# Patient Record
Sex: FEMALE | Race: WHITE | Employment: UNEMPLOYED | URBAN - METROPOLITAN AREA
[De-identification: names, ages, dates, MRNs, and addresses within clinical notes are randomized per-mention and may not be internally consistent; named-entity substitution may affect disease eponyms.]

---

## 2018-03-13 ENCOUNTER — HOSPITAL ENCOUNTER (EMERGENCY)
Age: 58
Discharge: HOME | End: 2018-03-13
Payer: COMMERCIAL

## 2018-03-13 ENCOUNTER — APPOINTMENT (OUTPATIENT)
Dept: CT IMAGING | Age: 58
End: 2018-03-13
Payer: COMMERCIAL

## 2018-03-13 VITALS
HEART RATE: 60 BPM | TEMPERATURE: 98 F | HEIGHT: 64 IN | SYSTOLIC BLOOD PRESSURE: 126 MMHG | BODY MASS INDEX: 19.97 KG/M2 | DIASTOLIC BLOOD PRESSURE: 79 MMHG | WEIGHT: 117 LBS | OXYGEN SATURATION: 98 % | RESPIRATION RATE: 18 BRPM

## 2018-03-13 DIAGNOSIS — F41.9 ANXIETY DISORDER, UNSPECIFIED TYPE: ICD-10-CM

## 2018-03-13 DIAGNOSIS — R07.9 CHEST PAIN, UNSPECIFIED TYPE: Primary | ICD-10-CM

## 2018-03-13 DIAGNOSIS — R20.2 PARESTHESIA OF ARM: ICD-10-CM

## 2018-03-13 LAB
ALBUMIN SERPL-MCNC: 4.4 G/DL (ref 3.9–4.9)
ALP BLD-CCNC: 83 U/L (ref 40–130)
ALT SERPL-CCNC: 19 U/L (ref 0–33)
ANION GAP SERPL CALCULATED.3IONS-SCNC: 11 MEQ/L (ref 7–13)
APTT: 27.5 SEC (ref 21.6–35.4)
AST SERPL-CCNC: 20 U/L (ref 0–35)
BASOPHILS ABSOLUTE: 0.1 K/UL (ref 0–0.2)
BASOPHILS RELATIVE PERCENT: 0.7 %
BILIRUB SERPL-MCNC: 0.2 MG/DL (ref 0–1.2)
BILIRUBIN URINE: NEGATIVE
BLOOD, URINE: NEGATIVE
BUN BLDV-MCNC: 11 MG/DL (ref 6–20)
CALCIUM SERPL-MCNC: 9.3 MG/DL (ref 8.6–10.2)
CHLORIDE BLD-SCNC: 101 MEQ/L (ref 98–107)
CLARITY: CLEAR
CO2: 23 MEQ/L (ref 22–29)
COLOR: YELLOW
CREAT SERPL-MCNC: 0.68 MG/DL (ref 0.5–0.9)
EKG ATRIAL RATE: 45 BPM
EKG P AXIS: 64 DEGREES
EKG P-R INTERVAL: 164 MS
EKG Q-T INTERVAL: 440 MS
EKG QRS DURATION: 86 MS
EKG QTC CALCULATION (BAZETT): 380 MS
EKG R AXIS: 71 DEGREES
EKG T AXIS: 58 DEGREES
EKG VENTRICULAR RATE: 45 BPM
EOSINOPHILS ABSOLUTE: 0.3 K/UL (ref 0–0.7)
EOSINOPHILS RELATIVE PERCENT: 2.9 %
GFR AFRICAN AMERICAN: >60
GFR NON-AFRICAN AMERICAN: >60
GLOBULIN: 2.1 G/DL (ref 2.3–3.5)
GLUCOSE BLD-MCNC: 104 MG/DL (ref 74–109)
GLUCOSE URINE: NEGATIVE MG/DL
HCT VFR BLD CALC: 44.3 % (ref 37–47)
HEMOGLOBIN: 14.6 G/DL (ref 12–16)
INR BLD: 1
KETONES, URINE: NEGATIVE MG/DL
LEUKOCYTE ESTERASE, URINE: NEGATIVE
LYMPHOCYTES ABSOLUTE: 4.5 K/UL (ref 1–4.8)
LYMPHOCYTES RELATIVE PERCENT: 48.5 %
MCH RBC QN AUTO: 30.7 PG (ref 27–31.3)
MCHC RBC AUTO-ENTMCNC: 32.9 % (ref 33–37)
MCV RBC AUTO: 93.3 FL (ref 82–100)
MONOCYTES ABSOLUTE: 0.6 K/UL (ref 0.2–0.8)
MONOCYTES RELATIVE PERCENT: 6.1 %
NEUTROPHILS ABSOLUTE: 3.9 K/UL (ref 1.4–6.5)
NEUTROPHILS RELATIVE PERCENT: 41.8 %
NITRITE, URINE: NEGATIVE
PDW BLD-RTO: 14.2 % (ref 11.5–14.5)
PH UA: 6.5 (ref 5–9)
PLATELET # BLD: 203 K/UL (ref 130–400)
POTASSIUM SERPL-SCNC: 3.7 MEQ/L (ref 3.5–5.1)
PROTEIN UA: NEGATIVE MG/DL
PROTHROMBIN TIME: 10.9 SEC (ref 8.1–13.7)
RBC # BLD: 4.75 M/UL (ref 4.2–5.4)
SODIUM BLD-SCNC: 135 MEQ/L (ref 132–144)
SPECIFIC GRAVITY UA: 1 (ref 1–1.03)
TOTAL CK: 58 U/L (ref 0–170)
TOTAL PROTEIN: 6.5 G/DL (ref 6.4–8.1)
TROPONIN: <0.01 NG/ML (ref 0–0.01)
URINE REFLEX TO CULTURE: NORMAL
UROBILINOGEN, URINE: 0.2 E.U./DL
WBC # BLD: 9.2 K/UL (ref 4.8–10.8)

## 2018-03-13 PROCEDURE — 36415 COLL VENOUS BLD VENIPUNCTURE: CPT

## 2018-03-13 PROCEDURE — 71275 CT ANGIOGRAPHY CHEST: CPT

## 2018-03-13 PROCEDURE — 85610 PROTHROMBIN TIME: CPT

## 2018-03-13 PROCEDURE — 99285 EMERGENCY DEPT VISIT HI MDM: CPT

## 2018-03-13 PROCEDURE — 81003 URINALYSIS AUTO W/O SCOPE: CPT

## 2018-03-13 PROCEDURE — 93005 ELECTROCARDIOGRAM TRACING: CPT

## 2018-03-13 PROCEDURE — 82550 ASSAY OF CK (CPK): CPT

## 2018-03-13 PROCEDURE — 80053 COMPREHEN METABOLIC PANEL: CPT

## 2018-03-13 PROCEDURE — 84484 ASSAY OF TROPONIN QUANT: CPT

## 2018-03-13 PROCEDURE — 6360000004 HC RX CONTRAST MEDICATION: Performed by: PHYSICIAN ASSISTANT

## 2018-03-13 PROCEDURE — 85730 THROMBOPLASTIN TIME PARTIAL: CPT

## 2018-03-13 PROCEDURE — 85025 COMPLETE CBC W/AUTO DIFF WBC: CPT

## 2018-03-13 RX ORDER — HYDROMORPHONE HYDROCHLORIDE 8 MG/1
8 TABLET ORAL EVERY 6 HOURS PRN
COMMUNITY
End: 2018-09-04 | Stop reason: SDUPTHER

## 2018-03-13 RX ORDER — OXYCODONE HYDROCHLORIDE 60 MG/1
60 TABLET, FILM COATED, EXTENDED RELEASE ORAL 2 TIMES DAILY
COMMUNITY
End: 2018-09-04 | Stop reason: SDUPTHER

## 2018-03-13 RX ORDER — DRONABINOL 10 MG/1
10 CAPSULE ORAL PRN
COMMUNITY
End: 2018-10-15

## 2018-03-13 RX ORDER — CHLORAL HYDRATE 500 MG
1000 CAPSULE ORAL 2 TIMES DAILY
COMMUNITY
End: 2019-07-09

## 2018-03-13 RX ORDER — PRAVASTATIN SODIUM 40 MG
40 TABLET ORAL DAILY
COMMUNITY
End: 2019-08-06 | Stop reason: SDUPTHER

## 2018-03-13 RX ORDER — ZALEPLON 10 MG/1
10 CAPSULE ORAL PRN
COMMUNITY
End: 2018-08-09

## 2018-03-13 RX ORDER — 0.9 % SODIUM CHLORIDE 0.9 %
1000 INTRAVENOUS SOLUTION INTRAVENOUS ONCE
Status: DISCONTINUED | OUTPATIENT
Start: 2018-03-13 | End: 2018-03-13 | Stop reason: HOSPADM

## 2018-03-13 RX ORDER — TOPIRAMATE 100 MG/1
100 TABLET, FILM COATED ORAL 2 TIMES DAILY
COMMUNITY
End: 2018-08-29 | Stop reason: ALTCHOICE

## 2018-03-13 RX ORDER — ESCITALOPRAM OXALATE 20 MG/1
20 TABLET ORAL DAILY
COMMUNITY
End: 2019-08-06

## 2018-03-13 RX ADMIN — IOPAMIDOL 100 ML: 755 INJECTION, SOLUTION INTRAVENOUS at 18:33

## 2018-03-13 ASSESSMENT — ENCOUNTER SYMPTOMS
COUGH: 0
SHORTNESS OF BREATH: 1
TROUBLE SWALLOWING: 0
VOMITING: 0
EYE PAIN: 0
COLOR CHANGE: 0
ABDOMINAL PAIN: 0
CHEST TIGHTNESS: 1
APNEA: 0
NAUSEA: 0
ALLERGIC/IMMUNOLOGIC NEGATIVE: 1

## 2018-03-13 ASSESSMENT — PAIN DESCRIPTION - ORIENTATION: ORIENTATION: RIGHT

## 2018-03-13 ASSESSMENT — HEART SCORE: ECG: 0

## 2018-03-13 ASSESSMENT — PAIN DESCRIPTION - DESCRIPTORS: DESCRIPTORS: HEAVINESS

## 2018-03-13 ASSESSMENT — PAIN SCALES - GENERAL: PAINLEVEL_OUTOF10: 6

## 2018-03-13 ASSESSMENT — PAIN DESCRIPTION - LOCATION: LOCATION: ARM;CHEST;NECK

## 2018-03-13 ASSESSMENT — PAIN DESCRIPTION - PAIN TYPE: TYPE: ACUTE PAIN

## 2018-03-13 NOTE — ED PROVIDER NOTES
[address] [facility] ED  eMERGENCY dEPARTMENT eNCOUnter      Pt Name: Micki Roberts  MRN: [id number]  [place] 1960  Date of evaluation: 3/13/2018  Provider: Amber Lauren PA-C    CHIEF COMPLAINT       Chief Complaint   Patient presents with    Shortness of Breath     SOB, left arm numbness and CP x 1 week to 10 days, sent by [facility] nurse, Hx of blood clots with PE x 4         HISTORY OF PRESENT ILLNESS  (Location/Symptom, Timing/Onset, Context/Setting, Quality, Duration, Modifying Factors, Severity.)   Micki Roberts is a 62 y.o. female who presents to the emergency department for shortness of breath. Patient states she has been experiencing SOB for past 10 days with intermittent chest pain and numbness of left arm. SOB worsens with exertion. Chest pain is under left breast region, describes as \"sharp but faint\" and non-radiating\",  Patient also reports left arm numbness that comes and goes, not positional.  Patient has history of PE dx 7/25/17. HPI    Nursing Notes were reviewed and I agree. REVIEW OF SYSTEMS    (2-9 systems for level 4, 10 or more for level 5)     Review of Systems   Constitutional: Positive for chills. Negative for diaphoresis and fever. HENT: Negative for hearing loss and trouble swallowing. Eyes: Negative for pain. Respiratory: Positive for chest tightness and shortness of breath. Negative for apnea and cough. Cardiovascular: Positive for chest pain. Negative for leg swelling. Gastrointestinal: Negative for abdominal pain, nausea and vomiting. Endocrine: Negative. Genitourinary: Negative for difficulty urinating, dysuria and hematuria. Musculoskeletal: Negative for neck pain and neck stiffness. Skin: Negative for color change. Allergic/Immunologic: Negative. Neurological: Positive for headaches. Negative for dizziness and light-headedness. Numbness: left arm\   Hematological: Negative. Psychiatric/Behavioral: Negative.     All other systems reviewed

## 2018-03-14 PROCEDURE — 93010 ELECTROCARDIOGRAM REPORT: CPT | Performed by: INTERNAL MEDICINE

## 2018-08-09 ENCOUNTER — OFFICE VISIT (OUTPATIENT)
Dept: FAMILY MEDICINE CLINIC | Age: 58
End: 2018-08-09
Payer: COMMERCIAL

## 2018-08-09 VITALS
OXYGEN SATURATION: 96 % | HEART RATE: 70 BPM | DIASTOLIC BLOOD PRESSURE: 84 MMHG | WEIGHT: 122.8 LBS | BODY MASS INDEX: 20.96 KG/M2 | HEIGHT: 64 IN | SYSTOLIC BLOOD PRESSURE: 130 MMHG

## 2018-08-09 DIAGNOSIS — G89.4 CHRONIC PAIN DISORDER: Primary | ICD-10-CM

## 2018-08-09 DIAGNOSIS — M54.17 LUMBOSACRAL RADICULOPATHY DUE TO TRAUMA: ICD-10-CM

## 2018-08-09 DIAGNOSIS — F11.29 OPIOID DEPENDENCE WITH OPIOID-INDUCED DISORDER (HCC): ICD-10-CM

## 2018-08-09 DIAGNOSIS — M96.1 POSTLAMINECTOMY SYNDROME, CERVICAL REGION: ICD-10-CM

## 2018-08-09 DIAGNOSIS — G90.519 COMPLEX REGIONAL PAIN SYNDROME TYPE 1 OF UPPER EXTREMITY, UNSPECIFIED LATERALITY: ICD-10-CM

## 2018-08-09 PROBLEM — F33.2 SEVERE EPISODE OF RECURRENT MAJOR DEPRESSIVE DISORDER, WITHOUT PSYCHOTIC FEATURES (HCC): Status: ACTIVE | Noted: 2017-04-17

## 2018-08-09 PROCEDURE — 99202 OFFICE O/P NEW SF 15 MIN: CPT | Performed by: FAMILY MEDICINE

## 2018-08-09 RX ORDER — GABAPENTIN 300 MG/1
300 CAPSULE ORAL 3 TIMES DAILY
Qty: 90 CAPSULE | Refills: 5 | Status: SHIPPED | OUTPATIENT
Start: 2018-08-09 | End: 2018-10-15 | Stop reason: SDUPTHER

## 2018-08-09 ASSESSMENT — PATIENT HEALTH QUESTIONNAIRE - PHQ9
SUM OF ALL RESPONSES TO PHQ9 QUESTIONS 1 & 2: 1
SUM OF ALL RESPONSES TO PHQ QUESTIONS 1-9: 1
1. LITTLE INTEREST OR PLEASURE IN DOING THINGS: 0
2. FEELING DOWN, DEPRESSED OR HOPELESS: 1
SUM OF ALL RESPONSES TO PHQ QUESTIONS 1-9: 1

## 2018-08-09 NOTE — PROGRESS NOTES
Chief Complaint   Patient presents with   [address]     sees painmanagement that is closing at end of month, needs new referral    Other     mamm within year, colonoscopy 2 years ago,        HPI:  Regine Villalobos is a 62 y.o. female     She is on INCREDIBLY high levels of narcotic pain medications    Reportedly Dr. Emmett Hamilton in [place] is to be closing at the end of the month    Reviewed history through care everywhere    She was seeing pain mgmt at [facility] in 2009 and was on norco 2-3/day at that time    Most recent office note from Int Med Dr. Allison Becerra states patient became argumentative when he declined to prescribe benzos to her because of her high dose opiate medication regimen    Multiple surgeries on her neck    She is seeing a grief counselor  She has been diagnosed with PTSD  She states she had a \"breakdown in communication\"    Brings up medical marijuana   Explained [facility] does not permit their doctors to prescribe    Mentions xanax and/or klonopin    She states she had a reaction to either abilify or protriptyline    Was on gabapentin in past    Patient Active Problem List   Diagnosis    Anxiety and depression    Brachial neuritis or radiculitis    Cervical spondylosis with myelopathy    Chronic pain disorder    Lumbago    Lumbosacral radiculopathy due to trauma    Other and unspecified hyperlipidemia    Postlaminectomy syndrome, cervical region    Reflex sympathetic dystrophy of upper extremity    Severe episode of recurrent major depressive disorder, without psychotic features ([facility] [address]    Spinal stenosis, other region       Current Outpatient Prescriptions   Medication Sig Dispense Refill    gabapentin (NEURONTIN) 300 MG capsule Take 1 capsule by mouth 3 times daily for 180 days. . 90 capsule 5    oxyCODONE (OXYCONTIN) 60 MG T12A extended release tablet Take 60 mg by mouth 2 times daily.  HYDROmorphone (DILAUDID) 8 MG tablet Take 8 mg by mouth every 6 hours as needed for Pain.  fentaNYL (ACTIQ) 800 MCG lollipop Place 800 mcg inside cheek 2 times daily.  topiramate (TOPAMAX) 100 MG tablet Take 100 mg by mouth 2 times daily      pravastatin (PRAVACHOL) 40 MG tablet Take 40 mg by mouth daily      escitalopram (LEXAPRO) 20 MG tablet Take 20 mg by mouth daily      Omega-3 Fatty Acids (FISH OIL) 1000 MG CAPS Take 1,000 mg by mouth 2 times daily      Multiple Vitamins-Minerals (MULTIVITAMIN ADULT PO) Take 1 tablet by mouth daily      dronabinol (MARINOL) 10 MG capsule Take 10 mg by mouth as needed. No current facility-administered medications for this visit. Past Medical History:   Diagnosis Date    Anxiety     Depression     Hyperlipidemia     Hypopotassemia     Lumbago     Osteopenia     PTSD (post-traumatic stress disorder)     Pulmonary embolism (HCC)     4 PE in bilat lungs    Spinal stenosis     congenital     Past Surgical History:   Procedure Laterality Date    HYSTERECTOMY      SPINE SURGERY      cervical x 4, fusions and reconstruction    TONSILLECTOMY      age 3     History reviewed. No pertinent family history.   Social History     Social History    Marital status:      Spouse name: N/A    Number of children: N/A    Years of education: N/A     Social History Main Topics    Smoking status: Current Every Day Smoker     Packs/day: 0.50     Years: 40.00     Types: Cigarettes    Smokeless tobacco: Never Used    Alcohol use No    Drug use: No    Sexual activity: Not Asked     Other Topics Concern    None     Social History Narrative    None     Allergies   Allergen Reactions    Latex Anaphylaxis    Ceclor [Cefaclor] Rash    Tetracyclines & Related Rash       Review of Systems:   General ROS: negative for - chills, fatigue, fever, malaise, weight gain or weight loss  Respiratory ROS: no cough, shortness of breath, or wheezing  Cardiovascular ROS: no chest pain or dyspnea on exertion  Gastrointestinal ROS: constipation  Genito-Urinary ROS: no dysuria, trouble voiding  Musculoskeletal ROS: chronic pain      In general patient otherwise reports feeling well. Physical Exam:  /84 (Site: Left Arm)   Pulse 70   Ht 5' 3.5\" (1.613 m)   Wt 122 lb 12.8 oz (55.7 kg)   SpO2 96%   Breastfeeding? No   BMI 21.41 kg/m²     Gen: Well, NAD, Alert, Oriented x 3   HEENT: EOMI, eyes clear, MMM  Skin: without rash or jaundice    Psych:she is generally calm today  No further exam today    Lab Results   Component Value Date    WBC 9.2 03/13/2018    HGB 14.6 03/13/2018    HCT 44.3 03/13/2018     03/13/2018    TRIG 118 12/10/2012    ALT 19 03/13/2018    AST 20 03/13/2018     03/13/2018    K 3.7 03/13/2018     03/13/2018    CREATININE 0.68 03/13/2018    BUN 11 03/13/2018    CO2 23 03/13/2018    TSH 0.542 (L) 12/06/2012    INR 1.0 03/13/2018         A&P   Diagnosis Orders   1. Chronic pain disorder  [facility] - ([facility]) Andreea Park MD    gabapentin (NEURONTIN) 300 MG capsule   2. Postlaminectomy syndrome, cervical region  [facility] - ([facility]) Andreea Park MD    gabapentin (NEURONTIN) 300 MG capsule   3. Lumbosacral radiculopathy due to trauma  [facility] - ([facility]) Andreea Park MD    gabapentin (NEURONTIN) 300 MG capsule   4. Complex regional pain syndrome type 1 of upper extremity, unspecified laterality  [facility] - ([facility]) Andreea Park MD    gabapentin (NEURONTIN) 300 MG capsule   5.  Opioid dependence with opioid-induced disorder (HCC)  gabapentin (NEURONTIN) 300 MG capsule       She is cutting down on her doses of opiates    I explained that I cannot prescribe her Benzos while on this high dose of opiates    Needs to see pain mgmt and be tapered off    She is interested in medical marijuana          Carole Allred MD

## 2018-08-29 ENCOUNTER — OFFICE VISIT (OUTPATIENT)
Dept: FAMILY MEDICINE CLINIC | Age: 58
End: 2018-08-29
Payer: COMMERCIAL

## 2018-08-29 VITALS
TEMPERATURE: 97.4 F | WEIGHT: 127.4 LBS | OXYGEN SATURATION: 96 % | BODY MASS INDEX: 21.75 KG/M2 | SYSTOLIC BLOOD PRESSURE: 118 MMHG | HEIGHT: 64 IN | HEART RATE: 61 BPM | DIASTOLIC BLOOD PRESSURE: 60 MMHG

## 2018-08-29 DIAGNOSIS — H57.12 LEFT EYE PAIN: Primary | ICD-10-CM

## 2018-08-29 DIAGNOSIS — H10.32 ACUTE CONJUNCTIVITIS OF LEFT EYE, UNSPECIFIED ACUTE CONJUNCTIVITIS TYPE: ICD-10-CM

## 2018-08-29 PROCEDURE — 99214 OFFICE O/P EST MOD 30 MIN: CPT | Performed by: NURSE PRACTITIONER

## 2018-08-29 RX ORDER — MOXIFLOXACIN 5 MG/ML
1 SOLUTION/ DROPS OPHTHALMIC 3 TIMES DAILY
Qty: 1 BOTTLE | Refills: 0 | Status: SHIPPED | OUTPATIENT
Start: 2018-08-29 | End: 2018-09-05

## 2018-08-29 ASSESSMENT — ENCOUNTER SYMPTOMS
EYE PAIN: 1
EYE DISCHARGE: 1
VOMITING: 1
EYE ITCHING: 1
EYE REDNESS: 1
PHOTOPHOBIA: 0
FOREIGN BODY SENSATION: 1
NAUSEA: 1
BLURRED VISION: 1
DOUBLE VISION: 0
RESPIRATORY NEGATIVE: 1

## 2018-08-29 NOTE — PROGRESS NOTES
blurred vision, pain, discharge, redness, itching and visual disturbance. Negative for double vision and photophobia. Respiratory: Negative. Cardiovascular: Negative. Gastrointestinal: Positive for nausea and vomiting. Genitourinary: Negative. Musculoskeletal: Negative. Skin: Negative. Neurological: Negative. Psychiatric/Behavioral: Negative. Objective  Vitals:    08/29/18 1154   BP: 118/60   Pulse: 61   Temp: 97.4 °F (36.3 °C)   SpO2: 96%   Weight: 127 lb 6.4 oz (57.8 kg)   Height: 5' 3.5\" (1.613 m)     Physical Exam   Constitutional: She is oriented to person, place, and time. She appears well-developed and well-nourished. HENT:   Head: Normocephalic and atraumatic. Eyes: Pupils are equal, round, and reactive to light. EOM are normal. Left eye exhibits discharge and exudate. Left conjunctiva is injected. Left conjunctiva has no hemorrhage. Neck: Normal range of motion. Neck supple. Cardiovascular: Normal rate. Pulmonary/Chest: Effort normal and breath sounds normal.   Abdominal: Soft. Bowel sounds are normal.   Musculoskeletal: Normal range of motion. Neurological: She is alert and oriented to person, place, and time. Skin: Skin is warm and dry. Psychiatric: She has a normal mood and affect. Her behavior is normal. Judgment normal.       Assessment & Plan     Diagnosis Orders   1. Left eye pain  moxifloxacin (VIGAMOX) 0.5 % ophthalmic solution   2. Acute conjunctivitis of left eye, unspecified acute conjunctivitis type  moxifloxacin (VIGAMOX) 0.5 % ophthalmic solution     Discussed with pt how to determine best path for weaning current pain meds and that is with the advice of pain managment    Discussed possible optho referral if vision worsens or pain worsens.     Side effects, adverse effects of the medication prescribed today, as well as treatment plan/ rationale and result expectations have been discussed with the patient who expresses understanding and desires to proceed. Close follow up to evaluate treatment results and for coordination of care. I have reviewed the patient's medical history in detail and updated the computerized patient record. As always, patient is advised that if symptoms worsen in any way they will proceed to the nearest emergency room. Return if symptoms worsen or fail to improve, for left eye pain .     Mattie Alcocer, APRN - CNP

## 2018-09-04 DIAGNOSIS — M54.5 LOW BACK PAIN, UNSPECIFIED BACK PAIN LATERALITY, UNSPECIFIED CHRONICITY, WITH SCIATICA PRESENCE UNSPECIFIED: ICD-10-CM

## 2018-09-04 DIAGNOSIS — M54.12 BRACHIAL NEURITIS OR RADICULITIS: ICD-10-CM

## 2018-09-04 DIAGNOSIS — M47.12 CERVICAL SPONDYLOSIS WITH MYELOPATHY: ICD-10-CM

## 2018-09-04 DIAGNOSIS — G89.4 CHRONIC PAIN DISORDER: Primary | ICD-10-CM

## 2018-09-04 DIAGNOSIS — M96.1 POSTLAMINECTOMY SYNDROME, CERVICAL REGION: ICD-10-CM

## 2018-09-04 DIAGNOSIS — G90.519 COMPLEX REGIONAL PAIN SYNDROME TYPE 1 OF UPPER EXTREMITY, UNSPECIFIED LATERALITY: ICD-10-CM

## 2018-09-04 RX ORDER — HYDROMORPHONE HYDROCHLORIDE 8 MG/1
8 TABLET ORAL EVERY 6 HOURS PRN
Qty: 40 TABLET | Refills: 0 | Status: SHIPPED | OUTPATIENT
Start: 2018-09-04 | End: 2018-09-14

## 2018-09-04 RX ORDER — OXYCODONE HYDROCHLORIDE 60 MG/1
60 TABLET, FILM COATED, EXTENDED RELEASE ORAL 2 TIMES DAILY
Qty: 20 EACH | Refills: 0 | Status: SHIPPED | OUTPATIENT
Start: 2018-09-04 | End: 2018-09-14

## 2018-09-10 ENCOUNTER — TELEPHONE (OUTPATIENT)
Dept: FAMILY MEDICINE CLINIC | Age: 58
End: 2018-09-10

## 2018-09-10 DIAGNOSIS — F11.20 OPIATE DEPENDENCE, CONTINUOUS (HCC): Primary | ICD-10-CM

## 2018-09-10 RX ORDER — CLONIDINE HYDROCHLORIDE 0.1 MG/1
0.1 TABLET ORAL 3 TIMES DAILY PRN
Qty: 60 TABLET | Refills: 3 | Status: SHIPPED | OUTPATIENT
Start: 2018-09-10 | End: 2019-01-15 | Stop reason: ALTCHOICE

## 2018-09-10 NOTE — TELEPHONE ENCOUNTER
Pt sees new pain mgmt tomorrow    I am not able to prescribe fentanyl lollipops    She has 60mg oxy ER and oral dilaudid still    Was at window angry that we cannot prescribe    Threatened staff that she would marcelo us because she could drop dead from withdrawals. I explained that she would need to be tapered down on her meds and that she would have to discuss this with pain mgmt. Prescribed clonidine for ease of symptoms and she does still have a fair amount of dilaudid and oxycodone    She apologized for being irritable.      Heaven Zhong MD

## 2018-09-10 NOTE — TELEPHONE ENCOUNTER
The patient stopped in at the office to ask about her Fentanyl. For Dr. Gamaliel Freeman to prescribe he would have to go online, sign up then fill out paperwork to go over with pt. Pt states she has been out of medication for 2 days and going through withdraw. She sees her new pain management tomorrow but they already told her that they do not prescribe fentanyl lollipops. Spoke to Dr. Gamaliel Freeman, he did not want to sign up and if pt going through withdraw she could go to the ER. Advised pt who became very upset, didn't know why he could just go online and sign up, states that she could die without this medication. She said she will marcelo Dr. Gamaliel Freeman if anything bad would happen to her and then wanted to speak to him personally. Dr. Gamaliel Freeman brought her back into a room to speak to her.

## 2018-09-28 ENCOUNTER — TELEPHONE (OUTPATIENT)
Dept: FAMILY MEDICINE CLINIC | Age: 58
End: 2018-09-28

## 2018-09-28 DIAGNOSIS — M54.17 LUMBOSACRAL RADICULOPATHY DUE TO TRAUMA: ICD-10-CM

## 2018-09-28 DIAGNOSIS — M54.5 LOW BACK PAIN, UNSPECIFIED BACK PAIN LATERALITY, UNSPECIFIED CHRONICITY, WITH SCIATICA PRESENCE UNSPECIFIED: ICD-10-CM

## 2018-09-28 DIAGNOSIS — M47.12 CERVICAL SPONDYLOSIS WITH MYELOPATHY: Primary | ICD-10-CM

## 2018-09-28 DIAGNOSIS — M96.1 POSTLAMINECTOMY SYNDROME, CERVICAL REGION: ICD-10-CM

## 2018-09-28 DIAGNOSIS — G90.519 COMPLEX REGIONAL PAIN SYNDROME TYPE 1 OF UPPER EXTREMITY, UNSPECIFIED LATERALITY: ICD-10-CM

## 2018-09-28 DIAGNOSIS — G89.4 CHRONIC PAIN DISORDER: ICD-10-CM

## 2018-10-02 ENCOUNTER — OFFICE VISIT (OUTPATIENT)
Dept: FAMILY MEDICINE CLINIC | Age: 58
End: 2018-10-02
Payer: COMMERCIAL

## 2018-10-02 VITALS
HEART RATE: 59 BPM | HEIGHT: 64 IN | BODY MASS INDEX: 21.92 KG/M2 | DIASTOLIC BLOOD PRESSURE: 76 MMHG | WEIGHT: 128.4 LBS | OXYGEN SATURATION: 98 % | SYSTOLIC BLOOD PRESSURE: 124 MMHG

## 2018-10-02 DIAGNOSIS — F11.23 OPIOID DEPENDENCE WITH WITHDRAWAL (HCC): Primary | ICD-10-CM

## 2018-10-02 PROCEDURE — 99212 OFFICE O/P EST SF 10 MIN: CPT | Performed by: FAMILY MEDICINE

## 2018-10-05 ENCOUNTER — TELEPHONE (OUTPATIENT)
Dept: FAMILY MEDICINE CLINIC | Age: 58
End: 2018-10-05

## 2018-10-10 ENCOUNTER — TELEPHONE (OUTPATIENT)
Dept: FAMILY MEDICINE CLINIC | Age: 58
End: 2018-10-10

## 2018-10-10 NOTE — TELEPHONE ENCOUNTER
Pt calling states that she can't get scheduled at [facility] pain management until they get a letter  From Dr Shelton Covert stating that pt went to detox and completed that . Also has to say that   hasn't prescribed any opiates and that he won't going forward. Asking for this to be faxed to [phone].         Pt can be reached at [phone]

## 2018-10-15 ENCOUNTER — OFFICE VISIT (OUTPATIENT)
Dept: FAMILY MEDICINE CLINIC | Age: 58
End: 2018-10-15
Payer: COMMERCIAL

## 2018-10-15 VITALS
DIASTOLIC BLOOD PRESSURE: 84 MMHG | TEMPERATURE: 97.7 F | HEIGHT: 64 IN | BODY MASS INDEX: 22.3 KG/M2 | WEIGHT: 130.6 LBS | HEART RATE: 53 BPM | SYSTOLIC BLOOD PRESSURE: 120 MMHG | OXYGEN SATURATION: 98 %

## 2018-10-15 DIAGNOSIS — R19.7 DIARRHEA, UNSPECIFIED TYPE: ICD-10-CM

## 2018-10-15 DIAGNOSIS — Z11.4 SCREENING FOR HIV (HUMAN IMMUNODEFICIENCY VIRUS): ICD-10-CM

## 2018-10-15 DIAGNOSIS — G89.4 CHRONIC PAIN DISORDER: ICD-10-CM

## 2018-10-15 DIAGNOSIS — Z11.59 ENCOUNTER FOR HEPATITIS C SCREENING TEST FOR LOW RISK PATIENT: ICD-10-CM

## 2018-10-15 DIAGNOSIS — M96.1 POSTLAMINECTOMY SYNDROME, CERVICAL REGION: ICD-10-CM

## 2018-10-15 DIAGNOSIS — M54.17 LUMBOSACRAL RADICULOPATHY DUE TO TRAUMA: ICD-10-CM

## 2018-10-15 DIAGNOSIS — R19.7 DIARRHEA, UNSPECIFIED TYPE: Primary | ICD-10-CM

## 2018-10-15 DIAGNOSIS — G90.519 COMPLEX REGIONAL PAIN SYNDROME TYPE 1 OF UPPER EXTREMITY, UNSPECIFIED LATERALITY: ICD-10-CM

## 2018-10-15 DIAGNOSIS — R53.83 FATIGUE, UNSPECIFIED TYPE: ICD-10-CM

## 2018-10-15 DIAGNOSIS — F11.29 OPIOID DEPENDENCE WITH OPIOID-INDUCED DISORDER (HCC): ICD-10-CM

## 2018-10-15 LAB
ALBUMIN SERPL-MCNC: 4.6 G/DL (ref 3.9–4.9)
ALP BLD-CCNC: 78 U/L (ref 40–130)
ALT SERPL-CCNC: 14 U/L (ref 0–33)
ANION GAP SERPL CALCULATED.3IONS-SCNC: 16 MEQ/L (ref 7–13)
AST SERPL-CCNC: 15 U/L (ref 0–35)
BILIRUB SERPL-MCNC: 0.7 MG/DL (ref 0–1.2)
BUN BLDV-MCNC: 13 MG/DL (ref 6–20)
CALCIUM SERPL-MCNC: 10 MG/DL (ref 8.6–10.2)
CHLORIDE BLD-SCNC: 102 MEQ/L (ref 98–107)
CO2: 22 MEQ/L (ref 22–29)
CREAT SERPL-MCNC: 0.69 MG/DL (ref 0.5–0.9)
GFR AFRICAN AMERICAN: >60
GFR NON-AFRICAN AMERICAN: >60
GLOBULIN: 2.8 G/DL (ref 2.3–3.5)
GLUCOSE BLD-MCNC: 96 MG/DL (ref 74–109)
HCT VFR BLD CALC: 48.8 % (ref 37–47)
HEMOGLOBIN: 16 G/DL (ref 12–16)
HEPATITIS C ANTIBODY INTERPRETATION: NORMAL
MCH RBC QN AUTO: 30.5 PG (ref 27–31.3)
MCHC RBC AUTO-ENTMCNC: 32.7 % (ref 33–37)
MCV RBC AUTO: 93.1 FL (ref 82–100)
PDW BLD-RTO: 14 % (ref 11.5–14.5)
PLATELET # BLD: 252 K/UL (ref 130–400)
POTASSIUM SERPL-SCNC: 4.6 MEQ/L (ref 3.5–5.1)
RBC # BLD: 5.25 M/UL (ref 4.2–5.4)
SODIUM BLD-SCNC: 140 MEQ/L (ref 132–144)
TOTAL PROTEIN: 7.4 G/DL (ref 6.4–8.1)
TSH SERPL DL<=0.05 MIU/L-ACNC: 1.47 UIU/ML (ref 0.27–4.2)
WBC # BLD: 12 K/UL (ref 4.8–10.8)

## 2018-10-15 PROCEDURE — 99213 OFFICE O/P EST LOW 20 MIN: CPT | Performed by: FAMILY MEDICINE

## 2018-10-15 RX ORDER — GABAPENTIN 300 MG/1
600 CAPSULE ORAL 3 TIMES DAILY
Qty: 180 CAPSULE | Refills: 5 | Status: SHIPPED | OUTPATIENT
Start: 2018-10-15 | End: 2019-01-15 | Stop reason: ALTCHOICE

## 2018-10-15 RX ORDER — DIPHENOXYLATE HYDROCHLORIDE AND ATROPINE SULFATE 2.5; .025 MG/1; MG/1
1 TABLET ORAL 4 TIMES DAILY PRN
Qty: 40 TABLET | Refills: 0 | Status: SHIPPED | OUTPATIENT
Start: 2018-10-15 | End: 2018-10-25

## 2018-10-15 NOTE — PROGRESS NOTES
Chief Complaint   Patient presents with    Fatigue     no energy, sob, headahce,trouble sleeping, chills, has been out of detox for 9 days, called detox and was told that this isn't normal        HPI:  Justine Mccarthy is a 62 y.o. female     Follow up from detox  Home 9 days  [place] better when in there    Called them and they said this is not normal    She has clonidine/gabapentin    Having ongoing low energy, sleep disturbance, chills  diarrhea    Multiple surgeries on her neck  She has been diagnosed with PTSD            Patient Active Problem List   Diagnosis    Anxiety and depression    Brachial neuritis or radiculitis    Cervical spondylosis with myelopathy    Chronic pain disorder    Lumbago    Lumbosacral radiculopathy due to trauma    Other and unspecified hyperlipidemia    Postlaminectomy syndrome, cervical region    Reflex sympathetic dystrophy of upper extremity    Severe episode of recurrent major depressive disorder, without psychotic features ([facility] [address]    Spinal stenosis, other region       Current Outpatient Prescriptions   Medication Sig Dispense Refill    diphenoxylate-atropine (DIPHENATOL) 2.5-0.025 MG per tablet Take 1 tablet by mouth 4 times daily as needed for Diarrhea for up to 10 days. . 40 tablet 0    gabapentin (NEURONTIN) 300 MG capsule Take 2 capsules by mouth 3 times daily for 180 days. . 180 capsule 5    cloNIDine (CATAPRES) 0.1 MG tablet Take 1 tablet by mouth 3 times daily as needed for Other (withdrawal) 60 tablet 3    pravastatin (PRAVACHOL) 40 MG tablet Take 40 mg by mouth daily      escitalopram (LEXAPRO) 20 MG tablet Take 20 mg by mouth daily      Omega-3 Fatty Acids (FISH OIL) 1000 MG CAPS Take 1,000 mg by mouth 2 times daily      Multiple Vitamins-Minerals (MULTIVITAMIN ADULT PO) Take 1 tablet by mouth daily       No current facility-administered medications for this visit.           Past Medical History:   Diagnosis Date    Anxiety     Depression    

## 2018-10-18 LAB — HIV-1 AND HIV-2 ANTIBODIES: NEGATIVE

## 2018-10-29 ENCOUNTER — TELEPHONE (OUTPATIENT)
Dept: FAMILY MEDICINE CLINIC | Age: 58
End: 2018-10-29

## 2018-11-21 ENCOUNTER — OFFICE VISIT (OUTPATIENT)
Dept: FAMILY MEDICINE CLINIC | Age: 58
End: 2018-11-21
Payer: MEDICARE

## 2018-11-21 VITALS
WEIGHT: 135.2 LBS | TEMPERATURE: 98.8 F | HEIGHT: 63 IN | SYSTOLIC BLOOD PRESSURE: 154 MMHG | DIASTOLIC BLOOD PRESSURE: 80 MMHG | BODY MASS INDEX: 23.96 KG/M2

## 2018-11-21 DIAGNOSIS — J01.90 ACUTE SINUSITIS, RECURRENCE NOT SPECIFIED, UNSPECIFIED LOCATION: Primary | ICD-10-CM

## 2018-11-21 DIAGNOSIS — G89.4 CHRONIC PAIN SYNDROME: ICD-10-CM

## 2018-11-21 DIAGNOSIS — R19.7 DIARRHEA, UNSPECIFIED TYPE: ICD-10-CM

## 2018-11-21 PROCEDURE — 3017F COLORECTAL CA SCREEN DOC REV: CPT | Performed by: NURSE PRACTITIONER

## 2018-11-21 PROCEDURE — 99214 OFFICE O/P EST MOD 30 MIN: CPT | Performed by: NURSE PRACTITIONER

## 2018-11-21 PROCEDURE — G8484 FLU IMMUNIZE NO ADMIN: HCPCS | Performed by: NURSE PRACTITIONER

## 2018-11-21 PROCEDURE — 4004F PT TOBACCO SCREEN RCVD TLK: CPT | Performed by: NURSE PRACTITIONER

## 2018-11-21 PROCEDURE — G8420 CALC BMI NORM PARAMETERS: HCPCS | Performed by: NURSE PRACTITIONER

## 2018-11-21 PROCEDURE — G8427 DOCREV CUR MEDS BY ELIG CLIN: HCPCS | Performed by: NURSE PRACTITIONER

## 2018-11-21 PROCEDURE — 3014F SCREEN MAMMO DOC REV: CPT | Performed by: NURSE PRACTITIONER

## 2018-11-21 RX ORDER — AMITRIPTYLINE HYDROCHLORIDE 25 MG/1
25 TABLET, FILM COATED ORAL NIGHTLY
Qty: 30 TABLET | Refills: 2 | Status: SHIPPED | OUTPATIENT
Start: 2018-11-21 | End: 2019-03-11 | Stop reason: SDUPTHER

## 2018-11-21 RX ORDER — DIPHENOXYLATE HYDROCHLORIDE AND ATROPINE SULFATE 2.5; .025 MG/1; MG/1
1 TABLET ORAL 4 TIMES DAILY PRN
Qty: 40 TABLET | Refills: 0 | Status: SHIPPED | OUTPATIENT
Start: 2018-11-21 | End: 2018-12-01

## 2018-11-21 RX ORDER — AMOXICILLIN AND CLAVULANATE POTASSIUM 875; 125 MG/1; MG/1
1 TABLET, FILM COATED ORAL 2 TIMES DAILY
Qty: 28 TABLET | Refills: 0 | Status: SHIPPED | OUTPATIENT
Start: 2018-11-21 | End: 2018-12-05

## 2018-11-21 RX ORDER — CELECOXIB 200 MG/1
200 CAPSULE ORAL DAILY
Qty: 30 CAPSULE | Refills: 2 | Status: SHIPPED | OUTPATIENT
Start: 2018-11-21 | End: 2019-03-11 | Stop reason: ALTCHOICE

## 2018-11-21 ASSESSMENT — ENCOUNTER SYMPTOMS
VOMITING: 0
WHEEZING: 0
COUGH: 1
SORE THROAT: 1
SINUS PAIN: 1

## 2019-01-15 ENCOUNTER — OFFICE VISIT (OUTPATIENT)
Dept: FAMILY MEDICINE CLINIC | Age: 59
End: 2019-01-15
Payer: MEDICARE

## 2019-01-15 VITALS
DIASTOLIC BLOOD PRESSURE: 84 MMHG | WEIGHT: 135 LBS | SYSTOLIC BLOOD PRESSURE: 138 MMHG | HEIGHT: 63 IN | HEART RATE: 98 BPM | OXYGEN SATURATION: 98 % | TEMPERATURE: 98.2 F | BODY MASS INDEX: 23.92 KG/M2

## 2019-01-15 DIAGNOSIS — R19.7 DIARRHEA, UNSPECIFIED TYPE: Primary | ICD-10-CM

## 2019-01-15 DIAGNOSIS — K52.832 LYMPHOCYTIC COLITIS: ICD-10-CM

## 2019-01-15 PROCEDURE — G8484 FLU IMMUNIZE NO ADMIN: HCPCS | Performed by: NURSE PRACTITIONER

## 2019-01-15 PROCEDURE — 4004F PT TOBACCO SCREEN RCVD TLK: CPT | Performed by: NURSE PRACTITIONER

## 2019-01-15 PROCEDURE — 99214 OFFICE O/P EST MOD 30 MIN: CPT | Performed by: NURSE PRACTITIONER

## 2019-01-15 PROCEDURE — G8427 DOCREV CUR MEDS BY ELIG CLIN: HCPCS | Performed by: NURSE PRACTITIONER

## 2019-01-15 PROCEDURE — G8420 CALC BMI NORM PARAMETERS: HCPCS | Performed by: NURSE PRACTITIONER

## 2019-01-15 PROCEDURE — 3017F COLORECTAL CA SCREEN DOC REV: CPT | Performed by: NURSE PRACTITIONER

## 2019-01-15 RX ORDER — BUDESONIDE 3 MG/1
9 CAPSULE, COATED PELLETS ORAL EVERY MORNING
Qty: 90 CAPSULE | Refills: 0 | Status: SHIPPED | OUTPATIENT
Start: 2019-01-15 | End: 2019-04-09 | Stop reason: SDUPTHER

## 2019-01-15 RX ORDER — TOPIRAMATE 100 MG/1
100 TABLET, FILM COATED ORAL DAILY
COMMUNITY
End: 2020-09-01 | Stop reason: SDUPTHER

## 2019-01-15 ASSESSMENT — ENCOUNTER SYMPTOMS
ABDOMINAL PAIN: 0
SHORTNESS OF BREATH: 0
COUGH: 0
DIARRHEA: 1

## 2019-02-14 ENCOUNTER — HOSPITAL ENCOUNTER (OUTPATIENT)
Dept: PHYSICAL THERAPY | Age: 59
Setting detail: THERAPIES SERIES
Discharge: HOME | End: 2019-02-14
Payer: MEDICARE

## 2019-02-14 PROCEDURE — G8984 CARRY CURRENT STATUS: HCPCS

## 2019-02-14 PROCEDURE — 97163 PT EVAL HIGH COMPLEX 45 MIN: CPT

## 2019-02-14 PROCEDURE — G8985 CARRY GOAL STATUS: HCPCS

## 2019-02-14 PROCEDURE — 97110 THERAPEUTIC EXERCISES: CPT

## 2019-02-14 ASSESSMENT — PAIN DESCRIPTION - INTENSITY: RATING_2: 5

## 2019-02-14 ASSESSMENT — PAIN DESCRIPTION - LOCATION
LOCATION_2: NECK
LOCATION: BACK

## 2019-02-14 ASSESSMENT — PAIN DESCRIPTION - DESCRIPTORS
DESCRIPTORS_2: ACHING
DESCRIPTORS: ACHING

## 2019-02-14 ASSESSMENT — PAIN DESCRIPTION - PAIN TYPE: TYPE: ACUTE PAIN

## 2019-02-14 ASSESSMENT — PAIN SCALES - GENERAL: PAINLEVEL_OUTOF10: 4

## 2019-02-18 ENCOUNTER — HOSPITAL ENCOUNTER (OUTPATIENT)
Dept: PHYSICAL THERAPY | Age: 59
Setting detail: THERAPIES SERIES
Discharge: HOME | End: 2019-02-18
Payer: MEDICARE

## 2019-02-18 PROCEDURE — 97110 THERAPEUTIC EXERCISES: CPT

## 2019-02-18 ASSESSMENT — PAIN DESCRIPTION - DESCRIPTORS: DESCRIPTORS: SORE

## 2019-02-18 ASSESSMENT — PAIN SCALES - GENERAL: PAINLEVEL_OUTOF10: 5

## 2019-02-18 ASSESSMENT — PAIN DESCRIPTION - PAIN TYPE: TYPE: CHRONIC PAIN

## 2019-02-18 ASSESSMENT — PAIN DESCRIPTION - ORIENTATION: ORIENTATION: LEFT;RIGHT

## 2019-02-18 ASSESSMENT — PAIN DESCRIPTION - LOCATION: LOCATION: NECK;ARM

## 2019-02-20 ENCOUNTER — HOSPITAL ENCOUNTER (OUTPATIENT)
Dept: PHYSICAL THERAPY | Age: 59
Setting detail: THERAPIES SERIES
Discharge: HOME | End: 2019-02-20
Payer: MEDICARE

## 2019-02-20 PROCEDURE — 97110 THERAPEUTIC EXERCISES: CPT

## 2019-02-20 PROCEDURE — 97140 MANUAL THERAPY 1/> REGIONS: CPT

## 2019-02-20 ASSESSMENT — PAIN DESCRIPTION - LOCATION: LOCATION: NECK

## 2019-02-20 ASSESSMENT — PAIN SCALES - GENERAL: PAINLEVEL_OUTOF10: 6

## 2019-02-20 ASSESSMENT — PAIN DESCRIPTION - PAIN TYPE: TYPE: CHRONIC PAIN

## 2019-02-20 ASSESSMENT — PAIN DESCRIPTION - DESCRIPTORS: DESCRIPTORS: ACHING;SORE

## 2019-02-20 ASSESSMENT — PAIN DESCRIPTION - ORIENTATION: ORIENTATION: RIGHT;LEFT

## 2019-02-25 ENCOUNTER — HOSPITAL ENCOUNTER (OUTPATIENT)
Dept: PHYSICAL THERAPY | Age: 59
Setting detail: THERAPIES SERIES
Discharge: HOME | End: 2019-02-25
Payer: MEDICARE

## 2019-02-25 PROCEDURE — 97110 THERAPEUTIC EXERCISES: CPT

## 2019-02-25 ASSESSMENT — PAIN DESCRIPTION - DESCRIPTORS: DESCRIPTORS: ACHING;SORE

## 2019-02-25 ASSESSMENT — PAIN DESCRIPTION - PAIN TYPE: TYPE: CHRONIC PAIN

## 2019-02-25 ASSESSMENT — PAIN DESCRIPTION - ORIENTATION: ORIENTATION: RIGHT;LEFT

## 2019-02-25 ASSESSMENT — PAIN SCALES - GENERAL: PAINLEVEL_OUTOF10: 4

## 2019-02-25 ASSESSMENT — PAIN DESCRIPTION - LOCATION: LOCATION: NECK;ARM

## 2019-02-27 ENCOUNTER — HOSPITAL ENCOUNTER (OUTPATIENT)
Dept: PHYSICAL THERAPY | Age: 59
Setting detail: THERAPIES SERIES
Discharge: HOME | End: 2019-02-27
Payer: MEDICARE

## 2019-03-04 ENCOUNTER — HOSPITAL ENCOUNTER (OUTPATIENT)
Dept: PHYSICAL THERAPY | Age: 59
Setting detail: THERAPIES SERIES
Discharge: HOME | End: 2019-03-04
Payer: MEDICARE

## 2019-03-11 ENCOUNTER — OFFICE VISIT (OUTPATIENT)
Dept: FAMILY MEDICINE CLINIC | Age: 59
End: 2019-03-11
Payer: MEDICARE

## 2019-03-11 VITALS
WEIGHT: 142 LBS | HEIGHT: 63 IN | DIASTOLIC BLOOD PRESSURE: 78 MMHG | SYSTOLIC BLOOD PRESSURE: 112 MMHG | TEMPERATURE: 97.7 F | HEART RATE: 90 BPM | BODY MASS INDEX: 25.16 KG/M2

## 2019-03-11 DIAGNOSIS — K21.9 GASTROESOPHAGEAL REFLUX DISEASE WITHOUT ESOPHAGITIS: ICD-10-CM

## 2019-03-11 DIAGNOSIS — R19.7 DIARRHEA, UNSPECIFIED TYPE: Primary | ICD-10-CM

## 2019-03-11 DIAGNOSIS — G89.4 CHRONIC PAIN SYNDROME: ICD-10-CM

## 2019-03-11 DIAGNOSIS — R19.7 DIARRHEA, UNSPECIFIED TYPE: ICD-10-CM

## 2019-03-11 LAB
ALBUMIN SERPL-MCNC: 4.5 G/DL (ref 3.5–4.6)
ALP BLD-CCNC: 91 U/L (ref 40–130)
ALT SERPL-CCNC: 20 U/L (ref 0–33)
ANION GAP SERPL CALCULATED.3IONS-SCNC: 16 MEQ/L (ref 9–15)
AST SERPL-CCNC: 22 U/L (ref 0–35)
BASOPHILS ABSOLUTE: 0.1 K/UL (ref 0–0.2)
BASOPHILS RELATIVE PERCENT: 0.8 %
BILIRUB SERPL-MCNC: 0.3 MG/DL (ref 0.2–0.7)
BUN BLDV-MCNC: 11 MG/DL (ref 6–20)
CALCIUM SERPL-MCNC: 10.2 MG/DL (ref 8.5–9.9)
CHLORIDE BLD-SCNC: 108 MEQ/L (ref 95–107)
CO2: 18 MEQ/L (ref 20–31)
CREAT SERPL-MCNC: 0.65 MG/DL (ref 0.5–0.9)
EOSINOPHILS ABSOLUTE: 0.1 K/UL (ref 0–0.7)
EOSINOPHILS RELATIVE PERCENT: 1.2 %
GFR AFRICAN AMERICAN: >60
GFR NON-AFRICAN AMERICAN: >60
GLOBULIN: 3 G/DL (ref 2.3–3.5)
GLUCOSE BLD-MCNC: 92 MG/DL (ref 70–99)
HCT VFR BLD CALC: 45.5 % (ref 37–47)
HEMOGLOBIN: 15 G/DL (ref 12–16)
LYMPHOCYTES ABSOLUTE: 3 K/UL (ref 1–4.8)
LYMPHOCYTES RELATIVE PERCENT: 28.1 %
MCH RBC QN AUTO: 31.8 PG (ref 27–31.3)
MCHC RBC AUTO-ENTMCNC: 33 % (ref 33–37)
MCV RBC AUTO: 96.5 FL (ref 82–100)
MONOCYTES ABSOLUTE: 0.6 K/UL (ref 0.2–0.8)
MONOCYTES RELATIVE PERCENT: 5.5 %
NEUTROPHILS ABSOLUTE: 6.8 K/UL (ref 1.4–6.5)
NEUTROPHILS RELATIVE PERCENT: 64.4 %
PDW BLD-RTO: 14.2 % (ref 11.5–14.5)
PLATELET # BLD: 263 K/UL (ref 130–400)
POTASSIUM SERPL-SCNC: 4 MEQ/L (ref 3.4–4.9)
RBC # BLD: 4.72 M/UL (ref 4.2–5.4)
SODIUM BLD-SCNC: 142 MEQ/L (ref 135–144)
TOTAL PROTEIN: 7.5 G/DL (ref 6.3–8)
WBC # BLD: 10.6 K/UL (ref 4.8–10.8)

## 2019-03-11 PROCEDURE — 99214 OFFICE O/P EST MOD 30 MIN: CPT | Performed by: NURSE PRACTITIONER

## 2019-03-11 PROCEDURE — G8484 FLU IMMUNIZE NO ADMIN: HCPCS | Performed by: NURSE PRACTITIONER

## 2019-03-11 PROCEDURE — G8419 CALC BMI OUT NRM PARAM NOF/U: HCPCS | Performed by: NURSE PRACTITIONER

## 2019-03-11 PROCEDURE — G8427 DOCREV CUR MEDS BY ELIG CLIN: HCPCS | Performed by: NURSE PRACTITIONER

## 2019-03-11 PROCEDURE — 3017F COLORECTAL CA SCREEN DOC REV: CPT | Performed by: NURSE PRACTITIONER

## 2019-03-11 PROCEDURE — 4004F PT TOBACCO SCREEN RCVD TLK: CPT | Performed by: NURSE PRACTITIONER

## 2019-03-11 RX ORDER — AMITRIPTYLINE HYDROCHLORIDE 25 MG/1
25 TABLET, FILM COATED ORAL NIGHTLY
Qty: 30 TABLET | Refills: 2 | Status: SHIPPED | OUTPATIENT
Start: 2019-03-11 | End: 2019-06-06 | Stop reason: SDUPTHER

## 2019-03-11 RX ORDER — DIPHENOXYLATE HYDROCHLORIDE AND ATROPINE SULFATE 2.5; .025 MG/1; MG/1
1 TABLET ORAL 4 TIMES DAILY PRN
Qty: 40 TABLET | Refills: 0 | Status: SHIPPED | OUTPATIENT
Start: 2019-03-11 | End: 2019-03-21

## 2019-03-11 RX ORDER — OMEPRAZOLE 20 MG/1
20 CAPSULE, DELAYED RELEASE ORAL
Qty: 30 CAPSULE | Refills: 0 | Status: SHIPPED | OUTPATIENT
Start: 2019-03-11 | End: 2019-07-09 | Stop reason: SDUPTHER

## 2019-03-11 ASSESSMENT — ENCOUNTER SYMPTOMS
COUGH: 1
DIARRHEA: 1
ABDOMINAL PAIN: 1
NAUSEA: 1

## 2019-03-14 LAB — ALLERGEN SEE NOTE: NORMAL

## 2019-03-15 LAB
ALLERGEN CODFISH IGE: <0.1 KU/L
ALLERGEN COW MILK IGE: <0.1 KU/L
ALLERGEN EGG WHITE IGE: <0.1 KU/L
ALLERGEN GLUTEN IGE: <0.1 KU/L
ALLERGEN HAZELNUT/FILBERT IGE: <0.1 KU/L
ALLERGEN PEANUT (F13) IGE: <0.1 KU/L
ALLERGEN SCALLOP IGE: <0.1 KU/L
ALLERGEN SHRIMP IGE: <0.1 KU/L
ALLERGEN SOYBEAN IGE: <0.1 KU/L
ALLERGEN WALNUT IGE: <0.1 KU/L
ALLERGEN WHEAT IGE: <0.1 KU/L
CELIAC PANEL: 6 UNITS (ref 0–19)
SESAME SEED IGE: <0.1 KU/L

## 2019-04-09 ENCOUNTER — OFFICE VISIT (OUTPATIENT)
Dept: FAMILY MEDICINE CLINIC | Age: 59
End: 2019-04-09
Payer: MEDICARE

## 2019-04-09 VITALS
BODY MASS INDEX: 24.48 KG/M2 | HEIGHT: 64 IN | TEMPERATURE: 97.6 F | OXYGEN SATURATION: 97 % | DIASTOLIC BLOOD PRESSURE: 70 MMHG | SYSTOLIC BLOOD PRESSURE: 118 MMHG | WEIGHT: 143.4 LBS | HEART RATE: 96 BPM

## 2019-04-09 DIAGNOSIS — R19.7 DIARRHEA, UNSPECIFIED TYPE: ICD-10-CM

## 2019-04-09 DIAGNOSIS — F33.2 SEVERE EPISODE OF RECURRENT MAJOR DEPRESSIVE DISORDER, WITHOUT PSYCHOTIC FEATURES (HCC): ICD-10-CM

## 2019-04-09 DIAGNOSIS — K52.832 LYMPHOCYTIC COLITIS: ICD-10-CM

## 2019-04-09 PROCEDURE — 99213 OFFICE O/P EST LOW 20 MIN: CPT | Performed by: NURSE PRACTITIONER

## 2019-04-09 PROCEDURE — G8427 DOCREV CUR MEDS BY ELIG CLIN: HCPCS | Performed by: NURSE PRACTITIONER

## 2019-04-09 PROCEDURE — G8419 CALC BMI OUT NRM PARAM NOF/U: HCPCS | Performed by: NURSE PRACTITIONER

## 2019-04-09 PROCEDURE — 3017F COLORECTAL CA SCREEN DOC REV: CPT | Performed by: NURSE PRACTITIONER

## 2019-04-09 PROCEDURE — 4004F PT TOBACCO SCREEN RCVD TLK: CPT | Performed by: NURSE PRACTITIONER

## 2019-04-09 RX ORDER — BUDESONIDE 3 MG/1
9 CAPSULE, COATED PELLETS ORAL EVERY MORNING
Qty: 270 CAPSULE | Refills: 0 | Status: SHIPPED | OUTPATIENT
Start: 2019-04-09 | End: 2019-07-08

## 2019-04-09 RX ORDER — BACLOFEN 10 MG/1
TABLET ORAL
Refills: 1 | COMMUNITY
Start: 2019-03-19 | End: 2020-02-03

## 2019-04-09 ASSESSMENT — ENCOUNTER SYMPTOMS
DIARRHEA: 1
ABDOMINAL DISTENTION: 1
BLOOD IN STOOL: 0
ABDOMINAL PAIN: 1

## 2019-04-09 NOTE — PROGRESS NOTES
Subjective  Chief Complaint   Patient presents with    1 Month Follow-Up    Diarrhea     pt states that this has been going on since OCT. pt states that she was given med around [facility] for a month and a friend of sisters who is a DR says 3 mos on med. med was for Colitis     Abdominal Pain     med helped but she is not sure she is on long enough, she understands she does not have medical background but would like to discuss        HPI     Pt is here for a fu of her lymphocytic colitis. Last time I saw her I gave her another script for enterocort. She states that her symptoms improved but then worsened again when getting off of it. She is not willing to see a GI at this time. Pt asking if she can get another round of the prescription for longer. Patient Active Problem List    Diagnosis Date Noted    Severe episode of recurrent major depressive disorder, without psychotic features ([facility] [address] 04/17/2017    Lumbosacral radiculopathy due to trauma 01/06/2016    Anxiety and depression 05/16/2013    Cervical spondylosis with myelopathy 05/16/2013    Chronic pain disorder 05/16/2013    Other and unspecified hyperlipidemia 04/02/2013    Postlaminectomy syndrome, cervical region 03/25/2009    Lumbago 03/11/2009    Reflex sympathetic dystrophy of upper extremity 03/18/2008    Brachial neuritis or radiculitis 01/24/2008    Spinal stenosis, other region 11/04/2003     Past Medical History:   Diagnosis Date    Anxiety     Depression     Hyperlipidemia     Hypopotassemia     Lumbago     Osteopenia     PTSD (post-traumatic stress disorder)     Pulmonary embolism (HCC)     4 PE in bilat lungs    Spinal stenosis     congenital     Past Surgical History:   Procedure Laterality Date    HYSTERECTOMY      SPINE SURGERY      cervical x 4, fusions and reconstruction    TONSILLECTOMY      age 3     No family history on file.   Social History     Socioeconomic History    Marital status:      Spouse name: None    Number of children: None    Years of education: None    Highest education level: None   Occupational History    None   Social Needs    Financial resource strain: None    Food insecurity:     Worry: None     Inability: None    Transportation needs:     Medical: None     Non-medical: None   Tobacco Use    Smoking status: Current Every Day Smoker     Packs/day: 0.50     Years: 40.00     Pack years: 20.00     Types: Cigarettes    Smokeless tobacco: Never Used   Substance and Sexual Activity    Alcohol use: No    Drug use: No    Sexual activity: None   Lifestyle    Physical activity:     Days per week: None     Minutes per session: None    Stress: None   Relationships    Social connections:     Talks on phone: None     Gets together: None     Attends Jewish service: None     Active member of club or organization: None     Attends meetings of clubs or organizations: None     Relationship status: None    Intimate partner violence:     Fear of current or ex partner: None     Emotionally abused: None     Physically abused: None     Forced sexual activity: None   Other Topics Concern    None   Social History Narrative    None     Current Outpatient Medications on File Prior to Visit   Medication Sig Dispense Refill    omeprazole (PRILOSEC) 20 MG delayed release capsule Take 1 capsule by mouth every morning (before breakfast) 30 capsule 0    amitriptyline (ELAVIL) 25 MG tablet Take 1 tablet by mouth nightly 30 tablet 2    topiramate (TOPAMAX) 100 MG tablet Take 100 mg by mouth 2 times daily      pravastatin (PRAVACHOL) 40 MG tablet Take 40 mg by mouth daily      escitalopram (LEXAPRO) 20 MG tablet Take 20 mg by mouth daily      Omega-3 Fatty Acids (FISH OIL) 1000 MG CAPS Take 1,000 mg by mouth 2 times daily      Multiple Vitamins-Minerals (MULTIVITAMIN ADULT PO) Take 1 tablet by mouth daily      baclofen (LIORESAL) 10 MG tablet TAKE ONE TABLET BY MOUTH FOUR TIMES A DAY AS NEEDED  1     No current facility-administered medications on file prior to visit. Allergies   Allergen Reactions    Latex Anaphylaxis     Rash    Fentanyl Rash     At site of application    Ketoprofen      Rash    Morphine Other (See Comments)     GI upset  Other reaction(s): GI Upset  GI upset    Tetracycline      Rash    Cefaclor Rash     Rash    Tetracyclines & Related Rash       Review of Systems   Gastrointestinal: Positive for abdominal distention, abdominal pain and diarrhea. Negative for blood in stool. Objective  Vitals:    04/09/19 1331   BP: 118/70   Pulse: 96   Temp: 97.6 °F (36.4 °C)   SpO2: 97%   Weight: 143 lb 6.4 oz (65 kg)   Height: 5' 3.5\" (1.613 m)     Physical Exam   Constitutional: She is oriented to person, place, and time. She appears well-developed and well-nourished. Cardiovascular: Normal rate, regular rhythm, normal heart sounds and intact distal pulses. Pulmonary/Chest: Effort normal and breath sounds normal.   Abdominal: Soft. She exhibits distension. She exhibits no mass. There is no tenderness. There is no guarding. No hernia. Neurological: She is alert and oriented to person, place, and time. Psychiatric: She has a normal mood and affect. Her behavior is normal. Judgment and thought content normal.   Nursing note and vitals reviewed. Assessment & Plan     Diagnosis Orders   1. Diarrhea, unspecified type  budesonide (ENTOCORT EC) 3 MG extended release capsule   2. Lymphocytic colitis  budesonide (ENTOCORT EC) 3 MG extended release capsule       No orders of the defined types were placed in this encounter.       Orders Placed This Encounter   Medications    budesonide (ENTOCORT EC) 3 MG extended release capsule     Sig: Take 3 capsules by mouth every morning     Dispense:  270 capsule     Refill:  0     Side effects, adverse effects of the medication prescribed today, as well as treatment plan/ rationale and result expectations have been discussed with the patient who expresses understanding and desires to proceed. Close follow up to evaluate treatment results and for coordination of care. I have reviewed the patient's medical history in detail and updated the computerized patient record. As always, patient is advised that if symptoms worsen in any way they will proceed to the nearest emergency room. Fu in 3 mos. Discussed tapering dose down after 6 weeks.     Tasneem Navarro, APRN - CNP

## 2019-06-06 DIAGNOSIS — G89.4 CHRONIC PAIN SYNDROME: ICD-10-CM

## 2019-06-06 RX ORDER — AMITRIPTYLINE HYDROCHLORIDE 25 MG/1
25 TABLET, FILM COATED ORAL NIGHTLY
Qty: 30 TABLET | Refills: 2 | Status: SHIPPED | OUTPATIENT
Start: 2019-06-06 | End: 2019-07-09

## 2019-06-19 ENCOUNTER — HOSPITAL ENCOUNTER (EMERGENCY)
Age: 59
Discharge: HOME | End: 2019-06-19
Attending: EMERGENCY MEDICINE
Payer: MEDICARE

## 2019-06-19 ENCOUNTER — APPOINTMENT (OUTPATIENT)
Dept: CT IMAGING | Age: 59
End: 2019-06-19
Payer: MEDICARE

## 2019-06-19 VITALS
RESPIRATION RATE: 20 BRPM | HEART RATE: 68 BPM | OXYGEN SATURATION: 97 % | TEMPERATURE: 98 F | BODY MASS INDEX: 25.16 KG/M2 | SYSTOLIC BLOOD PRESSURE: 132 MMHG | DIASTOLIC BLOOD PRESSURE: 63 MMHG | HEIGHT: 63 IN | WEIGHT: 142 LBS

## 2019-06-19 DIAGNOSIS — R53.1 GENERAL WEAKNESS: ICD-10-CM

## 2019-06-19 DIAGNOSIS — R19.7 DIARRHEA, UNSPECIFIED TYPE: ICD-10-CM

## 2019-06-19 DIAGNOSIS — R07.89 CHEST WALL PAIN: Primary | ICD-10-CM

## 2019-06-19 LAB
ALBUMIN SERPL-MCNC: 4.4 G/DL (ref 3.5–4.6)
ALP BLD-CCNC: 80 U/L (ref 40–130)
ALT SERPL-CCNC: 19 U/L (ref 0–33)
AMYLASE: 63 U/L (ref 22–93)
ANION GAP SERPL CALCULATED.3IONS-SCNC: 14 MEQ/L (ref 9–15)
AST SERPL-CCNC: 21 U/L (ref 0–35)
BACTERIA: NORMAL /HPF
BASOPHILS ABSOLUTE: 0 K/UL (ref 0–0.2)
BASOPHILS RELATIVE PERCENT: 0.5 %
BILIRUB SERPL-MCNC: 0.3 MG/DL (ref 0.2–0.7)
BILIRUBIN URINE: NEGATIVE
BLOOD, URINE: NORMAL
BUN BLDV-MCNC: 11 MG/DL (ref 6–20)
CALCIUM SERPL-MCNC: 10.3 MG/DL (ref 8.5–9.9)
CHLORIDE BLD-SCNC: 106 MEQ/L (ref 95–107)
CLARITY: CLEAR
CO2: 22 MEQ/L (ref 20–31)
COLOR: YELLOW
CREAT SERPL-MCNC: 0.7 MG/DL (ref 0.5–0.9)
D DIMER: 0.21 MG/L FEU (ref 0–0.5)
EKG ATRIAL RATE: 88 BPM
EKG P AXIS: 73 DEGREES
EKG P-R INTERVAL: 146 MS
EKG Q-T INTERVAL: 364 MS
EKG QRS DURATION: 90 MS
EKG QTC CALCULATION (BAZETT): 440 MS
EKG R AXIS: 88 DEGREES
EKG T AXIS: 71 DEGREES
EKG VENTRICULAR RATE: 88 BPM
EOSINOPHILS ABSOLUTE: 0.1 K/UL (ref 0–0.7)
EOSINOPHILS RELATIVE PERCENT: 1.4 %
EPITHELIAL CELLS, UA: NORMAL /HPF
GFR AFRICAN AMERICAN: >60
GFR NON-AFRICAN AMERICAN: >60
GLOBULIN: 2.8 G/DL (ref 2.3–3.5)
GLUCOSE BLD-MCNC: 144 MG/DL (ref 70–99)
GLUCOSE URINE: NEGATIVE MG/DL
HCT VFR BLD CALC: 46 % (ref 37–47)
HEMOGLOBIN: 15.3 G/DL (ref 12–16)
KETONES, URINE: NEGATIVE MG/DL
LEUKOCYTE ESTERASE, URINE: NEGATIVE
LIPASE: 55 U/L (ref 12–95)
LYMPHOCYTES ABSOLUTE: 3.4 K/UL (ref 1–4.8)
LYMPHOCYTES RELATIVE PERCENT: 36.5 %
MCH RBC QN AUTO: 31.5 PG (ref 27–31.3)
MCHC RBC AUTO-ENTMCNC: 33.3 % (ref 33–37)
MCV RBC AUTO: 94.6 FL (ref 82–100)
MONOCYTES ABSOLUTE: 0.3 K/UL (ref 0.2–0.8)
MONOCYTES RELATIVE PERCENT: 3.5 %
NEUTROPHILS ABSOLUTE: 5.4 K/UL (ref 1.4–6.5)
NEUTROPHILS RELATIVE PERCENT: 58.1 %
NITRITE, URINE: NEGATIVE
PDW BLD-RTO: 13.9 % (ref 11.5–14.5)
PH UA: 7.5 (ref 5–9)
PLATELET # BLD: 249 K/UL (ref 130–400)
POTASSIUM SERPL-SCNC: 3.8 MEQ/L (ref 3.4–4.9)
PROTEIN UA: NEGATIVE MG/DL
RBC # BLD: 4.86 M/UL (ref 4.2–5.4)
RBC UA: NORMAL /HPF (ref 0–2)
SODIUM BLD-SCNC: 142 MEQ/L (ref 135–144)
SPECIFIC GRAVITY UA: 1.01 (ref 1–1.03)
TOTAL PROTEIN: 7.2 G/DL (ref 6.3–8)
TROPONIN: <0.01 NG/ML (ref 0–0.01)
URINE REFLEX TO CULTURE: YES
UROBILINOGEN, URINE: 0.2 E.U./DL
WBC # BLD: 9.4 K/UL (ref 4.8–10.8)
WBC UA: NORMAL /HPF (ref 0–5)

## 2019-06-19 PROCEDURE — 99285 EMERGENCY DEPT VISIT HI MDM: CPT

## 2019-06-19 PROCEDURE — 83690 ASSAY OF LIPASE: CPT

## 2019-06-19 PROCEDURE — 85025 COMPLETE CBC W/AUTO DIFF WBC: CPT

## 2019-06-19 PROCEDURE — 87086 URINE CULTURE/COLONY COUNT: CPT

## 2019-06-19 PROCEDURE — 81001 URINALYSIS AUTO W/SCOPE: CPT

## 2019-06-19 PROCEDURE — 84484 ASSAY OF TROPONIN QUANT: CPT

## 2019-06-19 PROCEDURE — 80053 COMPREHEN METABOLIC PANEL: CPT

## 2019-06-19 PROCEDURE — 36415 COLL VENOUS BLD VENIPUNCTURE: CPT

## 2019-06-19 PROCEDURE — 2580000003 HC RX 258: Performed by: EMERGENCY MEDICINE

## 2019-06-19 PROCEDURE — 82150 ASSAY OF AMYLASE: CPT

## 2019-06-19 PROCEDURE — 74177 CT ABD & PELVIS W/CONTRAST: CPT

## 2019-06-19 PROCEDURE — 93005 ELECTROCARDIOGRAM TRACING: CPT

## 2019-06-19 PROCEDURE — 6360000004 HC RX CONTRAST MEDICATION: Performed by: EMERGENCY MEDICINE

## 2019-06-19 PROCEDURE — 85379 FIBRIN DEGRADATION QUANT: CPT

## 2019-06-19 RX ORDER — TRAMADOL HYDROCHLORIDE 50 MG/1
50 TABLET ORAL EVERY 8 HOURS PRN
Qty: 20 TABLET | Refills: 0 | Status: SHIPPED | OUTPATIENT
Start: 2019-06-19 | End: 2019-06-25

## 2019-06-19 RX ORDER — SODIUM CHLORIDE 0.9 % (FLUSH) 0.9 %
3 SYRINGE (ML) INJECTION EVERY 8 HOURS
Status: DISCONTINUED | OUTPATIENT
Start: 2019-06-19 | End: 2019-06-19 | Stop reason: HOSPADM

## 2019-06-19 RX ADMIN — IOPAMIDOL 100 ML: 755 INJECTION, SOLUTION INTRAVENOUS at 14:42

## 2019-06-19 RX ADMIN — Medication 3 ML: at 14:01

## 2019-06-19 ASSESSMENT — PAIN DESCRIPTION - DESCRIPTORS: DESCRIPTORS: ACHING

## 2019-06-19 ASSESSMENT — ENCOUNTER SYMPTOMS
SORE THROAT: 0
EYE PAIN: 0
SHORTNESS OF BREATH: 0
CHOKING: 0
DIARRHEA: 0
BLOOD IN STOOL: 0
TROUBLE SWALLOWING: 0
EYE REDNESS: 0
VOICE CHANGE: 0
BACK PAIN: 0
COUGH: 0
STRIDOR: 0
VOMITING: 0
CONSTIPATION: 0
CHEST TIGHTNESS: 0
WHEEZING: 0
ABDOMINAL PAIN: 1
SINUS PRESSURE: 0
FACIAL SWELLING: 0
EYE DISCHARGE: 0

## 2019-06-19 ASSESSMENT — PAIN DESCRIPTION - LOCATION: LOCATION: CHEST

## 2019-06-19 ASSESSMENT — PAIN DESCRIPTION - ORIENTATION: ORIENTATION: LEFT

## 2019-06-19 ASSESSMENT — PAIN SCALES - GENERAL: PAINLEVEL_OUTOF10: 4

## 2019-06-19 NOTE — ED PROVIDER NOTES
[facility] ED  eMERGENCY dEPARTMENT eNCOUnter      Pt Name: Shannon Govea  MRN: [id number]  [place] 1960  Date of evaluation: 2019  Provider: Yogi Davis MD    [address] [place] [address]       Chief Complaint   Patient presents with    Chest Pain     Chest pain upeer left chest Pain began yesterday. Patient also feels lightheaded and has a history of blood clots and a spinal injury. HISTORY OF PRESENT ILLNESS   (Location/Symptom, Timing/Onset,Context/Setting, Quality, Duration, Modifying Factors, Severity)  Note limiting factors. Shannon Govea is a 61 y.o. female who presents to the emergency department the patient with history of anxiety depression live with her  remote history of blood clots used to take xeralto l  but not anymore, his stomach issues has a diarrhea 6 times a day has some kind of colitis as per patient Wilfrido. he has 3 times a day this time patient hyperlipidemia smoker chronic pain management. The cervical laminectomy in the past status post hysterectomy  1 para 1 comes with a several days of left-sided chest discomfort labor blood clot no numbness tingling nor relation to the exertion and also upper abdominal discomfort along with the density diarrhea last few days no blood in the stool    HPI    NursingNotes were reviewed. REVIEW OF SYSTEMS    (2-9 systems for level 4, 10 or more for level 5)     Review of Systems   Constitutional: Negative. Negative for activity change and fever. HENT: Negative for congestion, drooling, facial swelling, mouth sores, nosebleeds, sinus pressure, sore throat, trouble swallowing and voice change. Eyes: Negative for pain, discharge, redness and visual disturbance. Respiratory: Negative for cough, choking, chest tightness, shortness of breath, wheezing and stridor. Cardiovascular: Positive for chest pain. Negative for palpitations and leg swelling. Gastrointestinal: Positive for abdominal pain.  Negative for blood in stool, constipation, diarrhea and vomiting. Endocrine: Negative for cold intolerance, polyphagia and polyuria. Genitourinary: Negative for dysuria, flank pain, frequency, genital sores and urgency. Musculoskeletal: Negative for back pain, joint swelling, neck pain and neck stiffness. Skin: Negative for pallor and rash. Neurological: Negative for tremors, seizures, syncope, weakness, numbness and headaches. Hematological: Negative for adenopathy. Does not bruise/bleed easily. Psychiatric/Behavioral: Negative for agitation, behavioral problems, hallucinations and sleep disturbance. The patient is not hyperactive. All other systems reviewed and are negative. Except as noted above the remainder of the review of systems was reviewed and negative.        PAST MEDICAL HISTORY     Past Medical History:   Diagnosis Date    Anxiety     Depression     Hyperlipidemia     Hypopotassemia     Lumbago     Osteopenia     PTSD (post-traumatic stress disorder)     Pulmonary embolism (HCC)     4 PE in bilat lungs    Spinal stenosis     congenital         SURGICALHISTORY       Past Surgical History:   Procedure Laterality Date    HYSTERECTOMY      SPINE SURGERY      cervical x 4, fusions and reconstruction    TONSILLECTOMY      age 3         CURRENT MEDICATIONS       Discharge Medication List as of 6/19/2019  3:25 PM      CONTINUE these medications which have NOT CHANGED    Details   amitriptyline (ELAVIL) 25 MG tablet Take 1 tablet by mouth nightly, Disp-30 tablet, R-2Normal      baclofen (LIORESAL) 10 MG tablet TAKE ONE TABLET BY MOUTH FOUR TIMES A DAY AS NEEDED, R-1Historical Med      budesonide (ENTOCORT EC) 3 MG extended release capsule Take 3 capsules by mouth every morning, Disp-270 capsule, R-0Normal      omeprazole (PRILOSEC) 20 MG delayed release capsule Take 1 capsule by mouth every morning (before breakfast), Disp-30 capsule, R-0Normal      topiramate (TOPAMAX) 100 MG tablet Take 100 or more for level 5)     ED Triage Vitals [06/19/19 1342]   BP Temp Temp Source Pulse Resp SpO2 Height Weight   (!) 145/70 98 °F (36.7 °C) Oral 90 18 98 % 5' 3\" (1.6 m) 142 lb (64.4 kg)   Physical Exam   Constitutional: She is oriented to person, place, and time. She appears well-developed and well-nourished. Active alert cooperative patient in no acute distress breathing normally talks in full sentences   HENT:   Head: Normocephalic. Right Ear: External ear normal.   Left Ear: External ear normal.   Nose: Nose normal.   Mouth/Throat: Oropharynx is clear and moist.   Eyes: Pupils are equal, round, and reactive to light. Conjunctivae are normal.   Neck: Normal range of motion. Neck supple. Cardiovascular: Normal rate, regular rhythm, normal heart sounds and intact distal pulses. Exam reveals no gallop. No murmur heard. Pulmonary/Chest: Breath sounds normal. No respiratory distress. She has no wheezes. Abdominal: Soft. Bowel sounds are normal. She exhibits no mass. There is no tenderness. There is no rebound and no guarding. Musculoskeletal: Normal range of motion. She exhibits no edema or tenderness. Neurological: She is alert and oriented to person, place, and time. No cranial nerve deficit. She exhibits normal muscle tone. Skin: Skin is warm. No rash noted. No erythema. Psychiatric: Her behavior is normal. Thought content normal.   Nursing note and vitals reviewed.     DIAGNOSTIC RESULTS     EKG: All EKG's are interpreted by the Emergency Department Physician who either signs or Co-signsthis chart in the absence of a cardiologist.        RADIOLOGY:   Non-plain filmimages such as CT, Ultrasound and MRI are read by the radiologist. Plain radiographic images are visualized and preliminarily interpreted by the emergency physician with the below findings:        Interpretation per the Radiologist below, if available at the time ofthis note:    CT ABDOMEN PELVIS W IV CONTRAST Additional Contrast? None Final Result   Impression:   1. There is no evidence for obstruction. No appendicitis or diverticulitis seen. 2. No hydronephrosis is seen   3. A cyst is seen in the right ovary. All CT scans at this facility use dose modulation, iterative reconstruction, and/or weight based dosing                                                       ED BEDSIDE ULTRASOUND:   Performed by ED Physician - none    LABS:  Labs Reviewed   COMPREHENSIVE METABOLIC PANEL - Abnormal; Notable for the following components:       Result Value    Glucose 144 (*)     Calcium 10.3 (*)     All other components within normal limits   CBC WITH AUTO DIFFERENTIAL - Abnormal; Notable for the following components:    MCH 31.5 (*)     All other components within normal limits   URINE CULTURE    Narrative:     ORDER#: [id number]                          ORDERED BY: See Rodarte  SOURCE: Urine Clean Catch                  COLLECTED:  06/19/19 14:25  ANTIBIOTICS AT MAXI.:                      RECEIVED :  06/19/19 21:41   URINE RT REFLEX TO CULTURE   LIPASE   AMYLASE   TROPONIN   D-DIMER, QUANTITATIVE   MICROSCOPIC URINALYSIS       All other labs were within normal range or not returned as of this dictation.     EMERGENCY DEPARTMENT COURSE and DIFFERENTIAL DIAGNOSIS/MDM:   Vitals:    Vitals:    06/19/19 1342 06/19/19 1523   BP: (!) 145/70 132/63   Pulse: 90 68   Resp: 18 20   Temp: 98 °F (36.7 °C)    TempSrc: Oral    SpO2: 98% 97%   Weight: 142 lb (64.4 kg)    Height: 5' 3\" (1.6 m)            MDM  Number of Diagnoses or Management Options  Diagnosis management comments: Patient with the numerous complaint for the past several days including chest discomfort thinking about PE (the shoulder pain patient has a history of chronic fibromyalgia chronic pain T fatigue no energy has been complaining of abdominal pain and thinking that she may have colitis used aggressive studies per day but now is getting treated areas per day EKG performed normal sinus rhythm. 8/m no PVCs no ST elevation or ischemia. AR interval of 146 ms the QRS duration of 90 ms. No chest tightness or chest pain on exertion denied drinking alcohol       Amount and/or Complexity of Data Reviewed  Clinical lab tests: ordered and reviewed  Tests in the radiology section of CPT®: ordered and reviewed        CRITICAL CARE TIME   Total Critical Care time was  minutes, excluding separately reportableprocedures. There was a high probability of clinicallysignificant/life threatening deterioration in the patient's condition which required my urgent intervention. CONSULTS:  None    PROCEDURES:  Unless otherwise noted below, none     Procedures    FINAL IMPRESSION      1. Chest wall pain    2. General weakness    3. Diarrhea, unspecified type          DISPOSITION/PLAN   DISPOSITION Decision To Discharge 06/19/2019 03:23:57 PM      PATIENT REFERRED TO:  RINA Crowley - CNP  [address] [address]    In 1 week        DISCHARGE MEDICATIONS:  Discharge Medication List as of 6/19/2019  3:25 PM      START taking these medications    Details   traMADol (ULTRAM) 50 MG tablet Take 1 tablet by mouth every 8 hours as needed for Pain for up to 6 days. , Disp-20 tablet, R-0Print                (Please note that portions of this note were completed with a voice recognition program.  Efforts were made to edit the dictations but occasionally words are mis-transcribed.)    Franki Barthel, MD (electronically signed)  Attending Emergency Physician       Franki Barthel, MD  06/19/19 [address] [facility] Gee Huggins MD  07/15/19 6274

## 2019-06-19 NOTE — ED TRIAGE NOTES
Patient came to ER with complaints of left sided chest pain that radiates around her back and up. She states she has a history of PE and has had very little energy to do anything the past few days.

## 2019-06-21 LAB — URINE CULTURE, ROUTINE: NORMAL

## 2019-07-09 ENCOUNTER — OFFICE VISIT (OUTPATIENT)
Dept: FAMILY MEDICINE CLINIC | Age: 59
End: 2019-07-09
Payer: MEDICARE

## 2019-07-09 VITALS
BODY MASS INDEX: 25.59 KG/M2 | TEMPERATURE: 98.5 F | HEIGHT: 63 IN | HEART RATE: 93 BPM | SYSTOLIC BLOOD PRESSURE: 136 MMHG | DIASTOLIC BLOOD PRESSURE: 88 MMHG | WEIGHT: 144.4 LBS | OXYGEN SATURATION: 98 %

## 2019-07-09 DIAGNOSIS — K21.9 GASTROESOPHAGEAL REFLUX DISEASE WITHOUT ESOPHAGITIS: ICD-10-CM

## 2019-07-09 DIAGNOSIS — R19.7 DIARRHEA, UNSPECIFIED TYPE: Primary | ICD-10-CM

## 2019-07-09 DIAGNOSIS — K52.832 LYMPHOCYTIC COLITIS: ICD-10-CM

## 2019-07-09 DIAGNOSIS — B37.0 THRUSH: ICD-10-CM

## 2019-07-09 DIAGNOSIS — R10.13 EPIGASTRIC PAIN: ICD-10-CM

## 2019-07-09 DIAGNOSIS — R63.5 WEIGHT GAIN: ICD-10-CM

## 2019-07-09 LAB — TSH REFLEX: 1.22 UIU/ML (ref 0.44–3.86)

## 2019-07-09 PROCEDURE — 4004F PT TOBACCO SCREEN RCVD TLK: CPT | Performed by: NURSE PRACTITIONER

## 2019-07-09 PROCEDURE — G8427 DOCREV CUR MEDS BY ELIG CLIN: HCPCS | Performed by: NURSE PRACTITIONER

## 2019-07-09 PROCEDURE — 99214 OFFICE O/P EST MOD 30 MIN: CPT | Performed by: NURSE PRACTITIONER

## 2019-07-09 PROCEDURE — 3017F COLORECTAL CA SCREEN DOC REV: CPT | Performed by: NURSE PRACTITIONER

## 2019-07-09 PROCEDURE — G8419 CALC BMI OUT NRM PARAM NOF/U: HCPCS | Performed by: NURSE PRACTITIONER

## 2019-07-09 RX ORDER — OMEPRAZOLE 20 MG/1
20 CAPSULE, DELAYED RELEASE ORAL
Qty: 30 CAPSULE | Refills: 0 | Status: SHIPPED | OUTPATIENT
Start: 2019-07-09 | End: 2019-08-06 | Stop reason: SDUPTHER

## 2019-07-09 ASSESSMENT — ENCOUNTER SYMPTOMS
NAUSEA: 1
ABDOMINAL PAIN: 1
ABDOMINAL DISTENTION: 1
COUGH: 0
DIARRHEA: 1
SHORTNESS OF BREATH: 0

## 2019-07-12 LAB — THYROID PEROXIDASE (TPO) ABS: <10 IU/ML (ref 0–35)

## 2019-07-24 ENCOUNTER — OFFICE VISIT (OUTPATIENT)
Dept: GASTROENTEROLOGY | Age: 59
End: 2019-07-24
Payer: MEDICARE

## 2019-07-24 VITALS
DIASTOLIC BLOOD PRESSURE: 74 MMHG | HEART RATE: 88 BPM | SYSTOLIC BLOOD PRESSURE: 132 MMHG | OXYGEN SATURATION: 97 % | WEIGHT: 146 LBS | HEIGHT: 63 IN | TEMPERATURE: 97.8 F | BODY MASS INDEX: 25.87 KG/M2

## 2019-07-24 DIAGNOSIS — R19.7 DIARRHEA, UNSPECIFIED TYPE: Primary | ICD-10-CM

## 2019-07-24 PROCEDURE — G8427 DOCREV CUR MEDS BY ELIG CLIN: HCPCS | Performed by: INTERNAL MEDICINE

## 2019-07-24 PROCEDURE — 4004F PT TOBACCO SCREEN RCVD TLK: CPT | Performed by: INTERNAL MEDICINE

## 2019-07-24 PROCEDURE — 99204 OFFICE O/P NEW MOD 45 MIN: CPT | Performed by: INTERNAL MEDICINE

## 2019-07-24 PROCEDURE — G8419 CALC BMI OUT NRM PARAM NOF/U: HCPCS | Performed by: INTERNAL MEDICINE

## 2019-07-24 PROCEDURE — 3017F COLORECTAL CA SCREEN DOC REV: CPT | Performed by: INTERNAL MEDICINE

## 2019-07-24 RX ORDER — DIPHENOXYLATE HYDROCHLORIDE AND ATROPINE SULFATE 2.5; .025 MG/1; MG/1
1 TABLET ORAL 4 TIMES DAILY PRN
Qty: 60 TABLET | Refills: 1 | Status: SHIPPED | OUTPATIENT
Start: 2019-07-24 | End: 2019-08-23

## 2019-07-24 RX ORDER — WHEAT DEXTRIN 3 G/3.8 G
POWDER (GRAM) ORAL
Qty: 1 CAN | Refills: 3 | Status: SHIPPED | OUTPATIENT
Start: 2019-07-24 | End: 2020-02-03

## 2019-07-24 NOTE — PROGRESS NOTES
Gastroenterology Clinic Visit    Wendy Hwang  [id number]  Chief Complaint   Patient presents with    Consultation     HPI: 61 y.o. female presents to the clinic with symptoms of diarrhea since October 2018. Patient prior to October 2018 was on multiple opioid analgesics through Dr. Becka Nation. In October Dr. Venkat Chang clinic was closed and patient stopped taking opiates which resulted in patient having significant diarrhea with bowel movements 7-8 times during the day. Over the last few months her symptoms have improved with now patient having 2-3 loose bowel movements mainly in the a.m. Patient denies any blood in the stool. Patient does report to having bloating and lower abdominal discomfort. Patient status post colonoscopy in 2014 and 2016 with suspected diagnosis of lymphocytic colitis and biopsies in 2014. He was treated with Entocort for a month with some response. Patient more recently treated with Entocort in January and then subsequently in March at which time patient to the Entocort for 3 months. Patient had thrush secondary to the Entocort use and required nystatin swish and swallow. Patient continues to have symptoms of altered taste sensation and numbness in the tongue since the diagnosis of thrush. Biopsies from colonoscopy in 2016 did not confirm the diagnosis of lymphocytic colitis    Review of Systems   All other systems reviewed and are negative.      Past Medical History:   Diagnosis Date    Anxiety     Depression     Hyperlipidemia     Hypopotassemia     Lumbago     Osteopenia     PTSD (post-traumatic stress disorder)     Pulmonary embolism (HCC)     4 PE in bilat lungs    Spinal stenosis     congenital       Past Surgical History:   Procedure Laterality Date    HYSTERECTOMY      SPINE SURGERY      cervical x 4, fusions and reconstruction    TONSILLECTOMY      age 3     Current Outpatient Medications on File Prior to Visit   Medication Sig Dispense Refill    omeprazole

## 2019-08-06 DIAGNOSIS — K21.9 GASTROESOPHAGEAL REFLUX DISEASE WITHOUT ESOPHAGITIS: ICD-10-CM

## 2019-08-06 RX ORDER — PRAVASTATIN SODIUM 40 MG
40 TABLET ORAL DAILY
Qty: 90 TABLET | Refills: 1 | Status: SHIPPED | OUTPATIENT
Start: 2019-08-06 | End: 2020-02-03

## 2019-08-06 RX ORDER — OMEPRAZOLE 20 MG/1
20 CAPSULE, DELAYED RELEASE ORAL
Qty: 90 CAPSULE | Refills: 1 | Status: SHIPPED | OUTPATIENT
Start: 2019-08-06 | End: 2020-01-03

## 2019-08-06 RX ORDER — ESCITALOPRAM OXALATE 10 MG/1
10 TABLET ORAL DAILY
Qty: 90 TABLET | Refills: 1 | Status: SHIPPED | OUTPATIENT
Start: 2019-08-06 | End: 2019-12-11 | Stop reason: CLARIF

## 2019-09-06 DIAGNOSIS — G89.4 CHRONIC PAIN SYNDROME: ICD-10-CM

## 2019-09-08 RX ORDER — AMITRIPTYLINE HYDROCHLORIDE 25 MG/1
25 TABLET, FILM COATED ORAL NIGHTLY
Qty: 30 TABLET | Refills: 2 | Status: SHIPPED | OUTPATIENT
Start: 2019-09-08 | End: 2020-02-03

## 2019-09-25 ENCOUNTER — OFFICE VISIT (OUTPATIENT)
Dept: GASTROENTEROLOGY | Age: 59
End: 2019-09-25
Payer: MEDICARE

## 2019-09-25 VITALS
DIASTOLIC BLOOD PRESSURE: 78 MMHG | BODY MASS INDEX: 25.52 KG/M2 | HEART RATE: 96 BPM | HEIGHT: 63 IN | SYSTOLIC BLOOD PRESSURE: 116 MMHG | OXYGEN SATURATION: 98 % | WEIGHT: 144 LBS

## 2019-09-25 DIAGNOSIS — R19.7 DIARRHEA, UNSPECIFIED TYPE: Primary | ICD-10-CM

## 2019-09-25 PROCEDURE — G8419 CALC BMI OUT NRM PARAM NOF/U: HCPCS | Performed by: NURSE PRACTITIONER

## 2019-09-25 PROCEDURE — 3017F COLORECTAL CA SCREEN DOC REV: CPT | Performed by: NURSE PRACTITIONER

## 2019-09-25 PROCEDURE — G8427 DOCREV CUR MEDS BY ELIG CLIN: HCPCS | Performed by: NURSE PRACTITIONER

## 2019-09-25 PROCEDURE — 99213 OFFICE O/P EST LOW 20 MIN: CPT | Performed by: NURSE PRACTITIONER

## 2019-09-25 PROCEDURE — 4004F PT TOBACCO SCREEN RCVD TLK: CPT | Performed by: NURSE PRACTITIONER

## 2019-09-25 RX ORDER — DIPHENOXYLATE HYDROCHLORIDE AND ATROPINE SULFATE 2.5; .025 MG/1; MG/1
1 TABLET ORAL
COMMUNITY
End: 2019-09-25 | Stop reason: SDUPTHER

## 2019-09-25 RX ORDER — DIPHENOXYLATE HYDROCHLORIDE AND ATROPINE SULFATE 2.5; .025 MG/1; MG/1
1 TABLET ORAL 4 TIMES DAILY PRN
Qty: 120 TABLET | Refills: 3 | Status: SHIPPED | OUTPATIENT
Start: 2019-09-25 | End: 2020-01-23

## 2019-10-01 ENCOUNTER — OFFICE VISIT (OUTPATIENT)
Dept: FAMILY MEDICINE CLINIC | Age: 59
End: 2019-10-01
Payer: MEDICARE

## 2019-10-01 VITALS
BODY MASS INDEX: 24.52 KG/M2 | SYSTOLIC BLOOD PRESSURE: 136 MMHG | OXYGEN SATURATION: 99 % | TEMPERATURE: 98.5 F | HEIGHT: 64 IN | DIASTOLIC BLOOD PRESSURE: 84 MMHG | WEIGHT: 143.6 LBS | HEART RATE: 89 BPM

## 2019-10-01 DIAGNOSIS — R35.0 URINARY FREQUENCY: Primary | ICD-10-CM

## 2019-10-01 DIAGNOSIS — R35.0 URINARY FREQUENCY: ICD-10-CM

## 2019-10-01 DIAGNOSIS — L08.9 INFECTED CYST OF SKIN: ICD-10-CM

## 2019-10-01 DIAGNOSIS — Z12.39 BREAST CANCER SCREENING: ICD-10-CM

## 2019-10-01 DIAGNOSIS — L72.9 INFECTED CYST OF SKIN: ICD-10-CM

## 2019-10-01 DIAGNOSIS — Z12.31 OTHER SCREENING MAMMOGRAM: ICD-10-CM

## 2019-10-01 DIAGNOSIS — R73.9 HYPERGLYCEMIA: ICD-10-CM

## 2019-10-01 DIAGNOSIS — B37.0 THRUSH: ICD-10-CM

## 2019-10-01 LAB
BILIRUBIN, POC: NORMAL
BLOOD URINE, POC: NORMAL
CLARITY, POC: NORMAL
COLOR, POC: YELLOW
GLUCOSE URINE, POC: NORMAL
HBA1C MFR BLD: 5.6 % (ref 4.8–5.9)
KETONES, POC: NORMAL
LEUKOCYTE EST, POC: NORMAL
NITRITE, POC: NORMAL
PH, POC: 7.5
PROTEIN, POC: NORMAL
SPECIFIC GRAVITY, POC: 1.01
UROBILINOGEN, POC: NORMAL

## 2019-10-01 PROCEDURE — G8484 FLU IMMUNIZE NO ADMIN: HCPCS | Performed by: NURSE PRACTITIONER

## 2019-10-01 PROCEDURE — 3017F COLORECTAL CA SCREEN DOC REV: CPT | Performed by: NURSE PRACTITIONER

## 2019-10-01 PROCEDURE — G8419 CALC BMI OUT NRM PARAM NOF/U: HCPCS | Performed by: NURSE PRACTITIONER

## 2019-10-01 PROCEDURE — 4004F PT TOBACCO SCREEN RCVD TLK: CPT | Performed by: NURSE PRACTITIONER

## 2019-10-01 PROCEDURE — G8427 DOCREV CUR MEDS BY ELIG CLIN: HCPCS | Performed by: NURSE PRACTITIONER

## 2019-10-01 PROCEDURE — 99214 OFFICE O/P EST MOD 30 MIN: CPT | Performed by: NURSE PRACTITIONER

## 2019-10-01 PROCEDURE — 81002 URINALYSIS NONAUTO W/O SCOPE: CPT | Performed by: NURSE PRACTITIONER

## 2019-10-01 RX ORDER — SULFAMETHOXAZOLE AND TRIMETHOPRIM 800; 160 MG/1; MG/1
1 TABLET ORAL 2 TIMES DAILY
Qty: 14 TABLET | Refills: 0 | Status: SHIPPED | OUTPATIENT
Start: 2019-10-01 | End: 2019-10-08

## 2019-10-01 ASSESSMENT — ENCOUNTER SYMPTOMS
COLOR CHANGE: 1
SHORTNESS OF BREATH: 0
CHEST TIGHTNESS: 0

## 2019-12-04 ENCOUNTER — TELEPHONE (OUTPATIENT)
Dept: FAMILY MEDICINE CLINIC | Age: 59
End: 2019-12-04

## 2019-12-04 DIAGNOSIS — M47.12 CERVICAL SPONDYLOSIS WITH MYELOPATHY: Primary | ICD-10-CM

## 2019-12-04 DIAGNOSIS — M54.17 LUMBOSACRAL RADICULOPATHY DUE TO TRAUMA: ICD-10-CM

## 2019-12-04 DIAGNOSIS — M96.1 POSTLAMINECTOMY SYNDROME, CERVICAL REGION: ICD-10-CM

## 2019-12-11 ENCOUNTER — OFFICE VISIT (OUTPATIENT)
Dept: FAMILY MEDICINE CLINIC | Age: 59
End: 2019-12-11
Payer: MEDICARE

## 2019-12-11 VITALS
DIASTOLIC BLOOD PRESSURE: 80 MMHG | SYSTOLIC BLOOD PRESSURE: 136 MMHG | HEIGHT: 64 IN | RESPIRATION RATE: 20 BRPM | BODY MASS INDEX: 24.34 KG/M2 | TEMPERATURE: 98.3 F | OXYGEN SATURATION: 97 % | WEIGHT: 142.6 LBS | HEART RATE: 113 BPM

## 2019-12-11 DIAGNOSIS — F41.9 ANXIETY AND DEPRESSION: ICD-10-CM

## 2019-12-11 DIAGNOSIS — B37.0 THRUSH: ICD-10-CM

## 2019-12-11 DIAGNOSIS — M54.12 BRACHIAL NEURITIS OR RADICULITIS: ICD-10-CM

## 2019-12-11 DIAGNOSIS — J40 BRONCHITIS: ICD-10-CM

## 2019-12-11 DIAGNOSIS — M54.17 LUMBOSACRAL RADICULOPATHY DUE TO TRAUMA: ICD-10-CM

## 2019-12-11 DIAGNOSIS — M47.12 CERVICAL SPONDYLOSIS WITH MYELOPATHY: Primary | ICD-10-CM

## 2019-12-11 DIAGNOSIS — F32.A ANXIETY AND DEPRESSION: ICD-10-CM

## 2019-12-11 PROCEDURE — 4004F PT TOBACCO SCREEN RCVD TLK: CPT | Performed by: NURSE PRACTITIONER

## 2019-12-11 PROCEDURE — G8484 FLU IMMUNIZE NO ADMIN: HCPCS | Performed by: NURSE PRACTITIONER

## 2019-12-11 PROCEDURE — 99214 OFFICE O/P EST MOD 30 MIN: CPT | Performed by: NURSE PRACTITIONER

## 2019-12-11 PROCEDURE — G8420 CALC BMI NORM PARAMETERS: HCPCS | Performed by: NURSE PRACTITIONER

## 2019-12-11 PROCEDURE — G8427 DOCREV CUR MEDS BY ELIG CLIN: HCPCS | Performed by: NURSE PRACTITIONER

## 2019-12-11 PROCEDURE — 3017F COLORECTAL CA SCREEN DOC REV: CPT | Performed by: NURSE PRACTITIONER

## 2019-12-11 RX ORDER — DULOXETIN HYDROCHLORIDE 30 MG/1
30 CAPSULE, DELAYED RELEASE ORAL DAILY
Qty: 30 CAPSULE | Refills: 3 | Status: SHIPPED | OUTPATIENT
Start: 2019-12-11 | End: 2020-02-03

## 2019-12-11 RX ORDER — TRAMADOL HYDROCHLORIDE 50 MG/1
50 TABLET ORAL EVERY 8 HOURS PRN
Qty: 90 TABLET | Refills: 0 | Status: SHIPPED | OUTPATIENT
Start: 2019-12-11 | End: 2020-02-03 | Stop reason: SDUPTHER

## 2019-12-11 RX ORDER — AMOXICILLIN AND CLAVULANATE POTASSIUM 875; 125 MG/1; MG/1
1 TABLET, FILM COATED ORAL 2 TIMES DAILY
Qty: 20 TABLET | Refills: 0 | Status: SHIPPED | OUTPATIENT
Start: 2019-12-11 | End: 2019-12-21

## 2019-12-11 RX ORDER — FLUCONAZOLE 150 MG/1
150 TABLET ORAL WEEKLY
Qty: 4 TABLET | Refills: 0 | Status: SHIPPED | OUTPATIENT
Start: 2019-12-11 | End: 2020-01-02

## 2019-12-11 ASSESSMENT — ENCOUNTER SYMPTOMS: COUGH: 1

## 2019-12-12 ASSESSMENT — ENCOUNTER SYMPTOMS
BACK PAIN: 1
SHORTNESS OF BREATH: 1
WHEEZING: 1

## 2019-12-30 ENCOUNTER — TELEPHONE (OUTPATIENT)
Dept: GASTROENTEROLOGY | Age: 59
End: 2019-12-30

## 2020-01-03 RX ORDER — ONDANSETRON 4 MG/1
4 TABLET, FILM COATED ORAL 3 TIMES DAILY PRN
Qty: 30 TABLET | Refills: 3 | Status: SHIPPED
Start: 2020-01-03 | End: 2020-04-20

## 2020-01-03 RX ORDER — OMEPRAZOLE 40 MG/1
40 CAPSULE, DELAYED RELEASE ORAL
Qty: 60 CAPSULE | Refills: 1 | Status: SHIPPED | OUTPATIENT
Start: 2020-01-03 | End: 2020-01-28

## 2020-01-17 ENCOUNTER — TELEPHONE (OUTPATIENT)
Dept: GASTROENTEROLOGY | Age: 60
End: 2020-01-17

## 2020-01-17 NOTE — TELEPHONE ENCOUNTER
Patient called and made appt. Patient states she talked to Kamar Dudley and was told to call Kamar Dudley and let her know she made appointment so she can call and talk to patient prior to appointment.

## 2020-01-23 ENCOUNTER — TELEPHONE (OUTPATIENT)
Dept: FAMILY MEDICINE CLINIC | Age: 60
End: 2020-01-23

## 2020-01-28 ENCOUNTER — OFFICE VISIT (OUTPATIENT)
Dept: GASTROENTEROLOGY | Age: 60
End: 2020-01-28
Payer: MEDICARE

## 2020-01-28 VITALS
SYSTOLIC BLOOD PRESSURE: 114 MMHG | HEART RATE: 97 BPM | HEIGHT: 63 IN | BODY MASS INDEX: 23.74 KG/M2 | OXYGEN SATURATION: 97 % | WEIGHT: 134 LBS | DIASTOLIC BLOOD PRESSURE: 64 MMHG

## 2020-01-28 PROCEDURE — 99213 OFFICE O/P EST LOW 20 MIN: CPT | Performed by: NURSE PRACTITIONER

## 2020-01-28 NOTE — PROGRESS NOTES
Gastroenterology Clinic Follow up Visit    Aguila Rey  [id number]  Chief Complaint   Patient presents with    Follow-up     HPI: Last seen in GI clinic on 9/25/19 with clinical history suggestive of opiate withdraw diarrhea. Interval change: Patient presents to the clinic with worsening diarrhea. Unable to tolerate lomotil, caused \"thrush\". Has tried budesonide in the past, but caused thrush as well. Patient reports she was started on Augmentin in November, 2019 for a 'lung\" infection. Since then she reports her diarrhea became worse. No melena or hematochezia. No F/C. Increased weakness/fatigue. Lower abd cramping, Levsin with suboptimal response. Having 8-9 liquid stool daily. No weight loss, some loss of appetite. No GERD or dysphagia. No CP, SOB, or palpitations. Background:   61 y.o. female presents to the clinic with symptoms of diarrhea since October 2018. Patient prior to October 2018 was on multiple opioid analgesics through Dr. Rosa Negron. In October Dr. Jacquelynn Alpers clinic was closed and patient stopped taking opiates which resulted in patient having significant diarrhea with bowel movements 7-8 times during the day. Over the last few months her symptoms have improved with now patient having 2-3 loose bowel movements mainly in the a.m. Patient denies any blood in the stool. Patient does report to having bloating and lower abdominal discomfort. Patient status post colonoscopy in 2014 and 2016 with suspected diagnosis of lymphocytic colitis and biopsies in 2014. He was treated with Entocort for a month with some response. Patient more recently treated with Entocort in January and then subsequently in March at which time patient to the Entocort for 3 months. Patient had thrush secondary to the Entocort use and required nystatin swish and swallow. Patient continues to have symptoms of altered taste sensation and numbness in the tongue since the diagnosis of thrush.    Biopsies from colonoscopy

## 2020-01-29 DIAGNOSIS — R19.7 DIARRHEA, UNSPECIFIED TYPE: ICD-10-CM

## 2020-01-30 ENCOUNTER — TELEPHONE (OUTPATIENT)
Dept: GASTROENTEROLOGY | Age: 60
End: 2020-01-30

## 2020-01-30 LAB
C DIFF TOXIN/ANTIGEN: NORMAL
GI BACTERIAL PATHOGENS BY PCR: NORMAL

## 2020-01-31 NOTE — TELEPHONE ENCOUNTER
Patient returned call. She will try the Benefiber and Imodium, however, patient is getting ready to go down to [place] for the rest of the winter and will not return until the end of April. If she is still having problems when she returns she will give us a call. Patient is not leaving for a week or so and will also call if she needs anything before she leaves.

## 2020-02-03 ENCOUNTER — OFFICE VISIT (OUTPATIENT)
Dept: FAMILY MEDICINE CLINIC | Age: 60
End: 2020-02-03
Payer: MEDICARE

## 2020-02-03 VITALS
TEMPERATURE: 98.3 F | OXYGEN SATURATION: 98 % | BODY MASS INDEX: 22.64 KG/M2 | HEIGHT: 64 IN | HEART RATE: 82 BPM | WEIGHT: 132.6 LBS | SYSTOLIC BLOOD PRESSURE: 130 MMHG | DIASTOLIC BLOOD PRESSURE: 82 MMHG

## 2020-02-03 PROCEDURE — 99214 OFFICE O/P EST MOD 30 MIN: CPT | Performed by: NURSE PRACTITIONER

## 2020-02-03 RX ORDER — DULOXETIN HYDROCHLORIDE 60 MG/1
60 CAPSULE, DELAYED RELEASE ORAL DAILY
Qty: 90 CAPSULE | Refills: 1 | Status: SHIPPED | OUTPATIENT
Start: 2020-02-03 | End: 2020-06-03 | Stop reason: DRUGHIGH

## 2020-02-03 RX ORDER — HYDROXYZINE PAMOATE 25 MG/1
25 CAPSULE ORAL NIGHTLY
Qty: 30 CAPSULE | Refills: 0 | Status: SHIPPED | OUTPATIENT
Start: 2020-02-03 | End: 2020-03-04

## 2020-02-03 RX ORDER — TRAMADOL HYDROCHLORIDE 50 MG/1
50 TABLET ORAL EVERY 6 HOURS PRN
COMMUNITY
End: 2020-02-03

## 2020-02-03 RX ORDER — TRAMADOL HYDROCHLORIDE 50 MG/1
50 TABLET ORAL EVERY 8 HOURS PRN
Qty: 90 TABLET | Refills: 0 | Status: SHIPPED | OUTPATIENT
Start: 2020-02-03 | End: 2020-03-24 | Stop reason: SDUPTHER

## 2020-02-03 ASSESSMENT — ENCOUNTER SYMPTOMS
SHORTNESS OF BREATH: 0
DIARRHEA: 1
COUGH: 0

## 2020-02-03 NOTE — PROGRESS NOTES
upset  Other reaction(s): GI Upset  GI upset    Tetracycline      Rash    Cefaclor Rash     Rash    Tetracyclines & Related Rash       Review of Systems   Constitutional: Negative for fatigue. Respiratory: Negative for cough and shortness of breath. Cardiovascular: Negative for chest pain. Gastrointestinal: Positive for diarrhea. Musculoskeletal: Positive for arthralgias. Psychiatric/Behavioral: Positive for sleep disturbance. Objective  Vitals:    02/03/20 1544   BP: 130/82   Pulse: 82   Temp: 98.3 °F (36.8 °C)   SpO2: 98%   Weight: 132 lb 9.6 oz (60.1 kg)   Height: 5' 3.5\" (1.613 m)     Physical Exam  Vitals signs and nursing note reviewed. Constitutional:       Appearance: Normal appearance. She is normal weight. HENT:      Head: Normocephalic. Mouth/Throat:      Mouth: Mucous membranes are moist.   Eyes:      Extraocular Movements: Extraocular movements intact. Conjunctiva/sclera: Conjunctivae normal.      Pupils: Pupils are equal, round, and reactive to light. Cardiovascular:      Rate and Rhythm: Normal rate and regular rhythm. Pulses: Normal pulses. Heart sounds: Normal heart sounds. Pulmonary:      Effort: Pulmonary effort is normal.      Breath sounds: Normal breath sounds. Skin:     General: Skin is warm. Neurological:      General: No focal deficit present. Mental Status: She is alert and oriented to person, place, and time. Mental status is at baseline. Psychiatric:         Mood and Affect: Mood normal.         Behavior: Behavior normal.         Thought Content: Thought content normal.         Judgment: Judgment normal.       Assessment & Plan     Diagnosis Orders   1. Anxiety and depression  traMADol (ULTRAM) 50 MG tablet   2. Breast cancer screening     3. Insomnia, unspecified type  hydrOXYzine (VISTARIL) 25 MG capsule   4. Chronic pain disorder  DULoxetine (CYMBALTA) 60 MG extended release capsule   5.  Cervical spondylosis with myelopathy traMADol (ULTRAM) 50 MG tablet   6. Brachial neuritis or radiculitis  traMADol (ULTRAM) 50 MG tablet   7. Lumbosacral radiculopathy due to trauma  traMADol (ULTRAM) 50 MG tablet   8. Other screening mammogram  GONZALEZ DIGITAL SCREEN W OR WO CAD BILATERAL       Orders Placed This Encounter   Procedures    GONZALEZ DIGITAL SCREEN W OR WO CAD BILATERAL     Standing Status:   Future     Standing Expiration Date:   4/3/2021     Order Specific Question:   Reason for exam:     Answer:   screening       Orders Placed This Encounter   Medications    DULoxetine (CYMBALTA) 60 MG extended release capsule     Sig: Take 1 capsule by mouth daily     Dispense:  90 capsule     Refill:  1    hydrOXYzine (VISTARIL) 25 MG capsule     Sig: Take 1 capsule by mouth nightly     Dispense:  30 capsule     Refill:  0    traMADol (ULTRAM) 50 MG tablet     Sig: Take 1 tablet by mouth every 8 hours as needed for Pain for up to 30 days. Dispense:  90 tablet     Refill:  0     Reduce doses taken as pain becomes manageable     Side effects, adverse effects of the medication prescribed today, as well as treatment plan/ rationale and result expectations have been discussed with the patient who expresses understanding and desires to proceed. Close follow up to evaluate treatment results and for coordination of care. I have reviewed the patient's medical history in detail and updated the computerized patient record. As always, patient is advised that if symptoms worsen in any way they will proceed to the nearest emergency room. FU in 4-6 weeks. Pt is going to try and continue to eat high fiber diet to help bulk stools up. If not improving will likely need scopes.        Joana Morales, APRN - CNP

## 2020-02-06 ENCOUNTER — TELEPHONE (OUTPATIENT)
Dept: GASTROENTEROLOGY | Age: 60
End: 2020-02-06

## 2020-02-06 RX ORDER — SODIUM, POTASSIUM,MAG SULFATES 17.5-3.13G
SOLUTION, RECONSTITUTED, ORAL ORAL
Qty: 1 BOTTLE | Refills: 0 | Status: SHIPPED | OUTPATIENT
Start: 2020-02-06 | End: 2020-02-07 | Stop reason: SDUPTHER

## 2020-02-07 RX ORDER — SODIUM, POTASSIUM,MAG SULFATES 17.5-3.13G
SOLUTION, RECONSTITUTED, ORAL ORAL
Qty: 1 BOTTLE | Refills: 0 | Status: SHIPPED | OUTPATIENT
Start: 2020-02-07 | End: 2020-04-29 | Stop reason: ALTCHOICE

## 2020-02-10 ENCOUNTER — HOSPITAL ENCOUNTER (EMERGENCY)
Age: 60
Discharge: HOME | End: 2020-02-10
Attending: EMERGENCY MEDICINE
Payer: MEDICARE

## 2020-02-10 VITALS
BODY MASS INDEX: 23.04 KG/M2 | OXYGEN SATURATION: 99 % | RESPIRATION RATE: 17 BRPM | SYSTOLIC BLOOD PRESSURE: 148 MMHG | HEART RATE: 62 BPM | HEIGHT: 63 IN | DIASTOLIC BLOOD PRESSURE: 87 MMHG | WEIGHT: 130 LBS | TEMPERATURE: 98.1 F

## 2020-02-10 LAB
ALBUMIN SERPL-MCNC: 4.7 G/DL (ref 3.5–4.6)
ALP BLD-CCNC: 87 U/L (ref 40–130)
ALT SERPL-CCNC: 14 U/L (ref 0–33)
ANION GAP SERPL CALCULATED.3IONS-SCNC: 15 MEQ/L (ref 9–15)
AST SERPL-CCNC: 26 U/L (ref 0–35)
BASOPHILS ABSOLUTE: 0.1 K/UL (ref 0–0.2)
BASOPHILS RELATIVE PERCENT: 0.6 %
BILIRUB SERPL-MCNC: 0.5 MG/DL (ref 0.2–0.7)
BILIRUBIN URINE: NEGATIVE
BLOOD, URINE: NEGATIVE
BUN BLDV-MCNC: 8 MG/DL (ref 6–20)
CALCIUM SERPL-MCNC: 10.3 MG/DL (ref 8.5–9.9)
CHLORIDE BLD-SCNC: 100 MEQ/L (ref 95–107)
CLARITY: CLEAR
CO2: 20 MEQ/L (ref 20–31)
COLOR: YELLOW
CREAT SERPL-MCNC: 0.62 MG/DL (ref 0.5–0.9)
EOSINOPHILS ABSOLUTE: 0.2 K/UL (ref 0–0.7)
EOSINOPHILS RELATIVE PERCENT: 1.5 %
GFR AFRICAN AMERICAN: >60
GFR NON-AFRICAN AMERICAN: >60
GLOBULIN: 2.8 G/DL (ref 2.3–3.5)
GLUCOSE BLD-MCNC: 102 MG/DL (ref 70–99)
GLUCOSE URINE: NEGATIVE MG/DL
HCT VFR BLD CALC: 48.3 % (ref 37–47)
HEMOGLOBIN: 16.4 G/DL (ref 12–16)
KETONES, URINE: NEGATIVE MG/DL
LACTIC ACID: 1.3 MMOL/L (ref 0.5–2.2)
LEUKOCYTE ESTERASE, URINE: NEGATIVE
LIPASE: 42 U/L (ref 12–95)
LYMPHOCYTES ABSOLUTE: 3.8 K/UL (ref 1–4.8)
LYMPHOCYTES RELATIVE PERCENT: 38 %
MAGNESIUM: 2 MG/DL (ref 1.7–2.4)
MCH RBC QN AUTO: 30.9 PG (ref 27–31.3)
MCHC RBC AUTO-ENTMCNC: 34 % (ref 33–37)
MCV RBC AUTO: 90.8 FL (ref 82–100)
MONOCYTES ABSOLUTE: 0.8 K/UL (ref 0.2–0.8)
MONOCYTES RELATIVE PERCENT: 8.4 %
NEUTROPHILS ABSOLUTE: 5.2 K/UL (ref 1.4–6.5)
NEUTROPHILS RELATIVE PERCENT: 51.5 %
NITRITE, URINE: NEGATIVE
PDW BLD-RTO: 13.8 % (ref 11.5–14.5)
PH UA: 7 (ref 5–9)
PLATELET # BLD: 266 K/UL (ref 130–400)
POTASSIUM SERPL-SCNC: 4.4 MEQ/L (ref 3.4–4.9)
PROTEIN UA: NEGATIVE MG/DL
RBC # BLD: 5.32 M/UL (ref 4.2–5.4)
SODIUM BLD-SCNC: 135 MEQ/L (ref 135–144)
SPECIFIC GRAVITY UA: 1 (ref 1–1.03)
TOTAL PROTEIN: 7.5 G/DL (ref 6.3–8)
URINE REFLEX TO CULTURE: NORMAL
UROBILINOGEN, URINE: 0.2 E.U./DL
WBC # BLD: 10.1 K/UL (ref 4.8–10.8)

## 2020-02-10 PROCEDURE — 83605 ASSAY OF LACTIC ACID: CPT

## 2020-02-10 PROCEDURE — 85025 COMPLETE CBC W/AUTO DIFF WBC: CPT

## 2020-02-10 PROCEDURE — 83735 ASSAY OF MAGNESIUM: CPT

## 2020-02-10 PROCEDURE — 99284 EMERGENCY DEPT VISIT MOD MDM: CPT

## 2020-02-10 PROCEDURE — 83690 ASSAY OF LIPASE: CPT

## 2020-02-10 PROCEDURE — 6370000000 HC RX 637 (ALT 250 FOR IP): Performed by: EMERGENCY MEDICINE

## 2020-02-10 PROCEDURE — 81003 URINALYSIS AUTO W/O SCOPE: CPT

## 2020-02-10 PROCEDURE — 2580000003 HC RX 258: Performed by: EMERGENCY MEDICINE

## 2020-02-10 PROCEDURE — 36415 COLL VENOUS BLD VENIPUNCTURE: CPT

## 2020-02-10 PROCEDURE — 80053 COMPREHEN METABOLIC PANEL: CPT

## 2020-02-10 RX ORDER — CHOLESTYRAMINE 4 G/9G
1 POWDER, FOR SUSPENSION ORAL 2 TIMES DAILY
Qty: 90 PACKET | Refills: 3 | Status: SHIPPED | OUTPATIENT
Start: 2020-02-10 | End: 2020-07-07

## 2020-02-10 RX ORDER — 0.9 % SODIUM CHLORIDE 0.9 %
1000 INTRAVENOUS SOLUTION INTRAVENOUS ONCE
Status: COMPLETED | OUTPATIENT
Start: 2020-02-10 | End: 2020-02-10

## 2020-02-10 RX ORDER — CHOLESTYRAMINE LIGHT 4 G/5.7G
4 POWDER, FOR SUSPENSION ORAL ONCE
Status: COMPLETED | OUTPATIENT
Start: 2020-02-10 | End: 2020-02-10

## 2020-02-10 RX ADMIN — CHOLESTYRAMINE 4 G: 4 POWDER, FOR SUSPENSION ORAL at 18:44

## 2020-02-10 RX ADMIN — SODIUM CHLORIDE 1000 ML: 9 INJECTION, SOLUTION INTRAVENOUS at 18:30

## 2020-02-10 ASSESSMENT — ENCOUNTER SYMPTOMS
NAUSEA: 0
SHORTNESS OF BREATH: 0
EYE PAIN: 0
DIARRHEA: 1
ABDOMINAL PAIN: 1
SORE THROAT: 0
CHEST TIGHTNESS: 0
VOMITING: 0

## 2020-02-11 NOTE — ED NOTES
This RN assumed care of pt from [facility]. Upon assessment, pt is A/Ox4, skin p/w/d, resp even and unlabored, msp's intact.      Renate Campuzano RN  02/10/20 1921

## 2020-02-11 NOTE — ED PROVIDER NOTES
activity change, appetite change and fatigue. HENT: Negative for congestion and sore throat. Eyes: Negative for pain and visual disturbance. Respiratory: Negative for chest tightness and shortness of breath. Cardiovascular: Negative for chest pain. Gastrointestinal: Positive for abdominal pain and diarrhea. Negative for nausea and vomiting. Endocrine: Negative for polydipsia. Genitourinary: Negative for flank pain and urgency. Musculoskeletal: Negative for gait problem and neck stiffness. Skin: Negative for rash. Neurological: Negative for weakness, light-headedness and headaches. Psychiatric/Behavioral: Negative for confusion and sleep disturbance. Except as noted above the remainder of the review of systems was reviewed and negative. PAST MEDICAL HISTORY     Past Medical History:   Diagnosis Date    Anxiety     Depression     Hyperlipidemia     Hypopotassemia     Lumbago     Osteopenia     PTSD (post-traumatic stress disorder)     Pulmonary embolism (HCC)     4 PE in bilat lungs    Spinal stenosis     congenital         SURGICALHISTORY       Past Surgical History:   Procedure Laterality Date    HYSTERECTOMY      SPINE SURGERY      cervical x 4, fusions and reconstruction    TONSILLECTOMY      age 3         CURRENT MEDICATIONS       Previous Medications    DULOXETINE (CYMBALTA) 60 MG EXTENDED RELEASE CAPSULE    Take 1 capsule by mouth daily    HYDROXYZINE (VISTARIL) 25 MG CAPSULE    Take 1 capsule by mouth nightly    LOPERAMIDE HCL (IMODIUM PO)    Take by mouth daily as needed    NA SULFATE-K SULFATE-MG SULF 17.5-3.13-1.6 GM/177ML SOLN    As directed    ONDANSETRON (ZOFRAN) 4 MG TABLET    Take 1 tablet by mouth 3 times daily as needed for Nausea or Vomiting    TOPIRAMATE (TOPAMAX) 100 MG TABLET    Take 100 mg by mouth 2 times daily    TRAMADOL (ULTRAM) 50 MG TABLET    Take 1 tablet by mouth every 8 hours as needed for Pain for up to 30 days.        ALLERGIES     Latex; Glucose 102 (*)     Calcium 10.3 (*)     Alb 4.7 (*)     All other components within normal limits   URINE RT REFLEX TO CULTURE   LACTIC ACID, PLASMA   MAGNESIUM   LIPASE       All other labs were within normal range or not returned as of this dictation. EMERGENCY DEPARTMENT COURSE and DIFFERENTIAL DIAGNOSIS/MDM:   Vitals:    Vitals:    02/10/20 1704 02/10/20 1845   BP: (!) 141/77 (!) 157/91   Pulse: 105 66   Resp: 16 16   Temp: 98.1 °F (36.7 °C)    TempSrc: Oral    SpO2: 96% 98%   Weight: 130 lb (59 kg)    Height: 5' 3\" (1.6 m)        Patient is here with acute on chronic diarrhea. I did call Dr. Kevin Patten who is going to try to get her in a little sooner than February 19 for endoscopy. In the meantime I am going to put her on Questran twice a day and see if that makes a difference. I told the patient to stop all dairy for now. Continue taking Imodium in addition to the [address] [place] diet. Possibly Celiac? SIBO? MDM      REASSESSMENT          CRITICAL CARE TIME   Total Critical Care time was 0 minutes, excluding separatelyreportable procedures. There was a high probability ofclinically significant/life threatening deterioration in the patient's condition which required my urgent intervention. CONSULTS:  IP CONSULT TO GI    PROCEDURES:  Unless otherwise noted below, none     Procedures    FINAL IMPRESSION      1. Diarrhea, unspecified type          DISPOSITION/PLAN   DISPOSITION        PATIENT REFERREDTO:  Yelena Espana MD  [address] [place] [id number] [phone]            DISCHARGEMEDICATIONS:  New Prescriptions    CHOLESTYRAMINE Saranya Greco) 4 G PACKET    Take 1 packet by mouth 2 times daily     Controlled Substances Monitoring:     No flowsheet data found.     (Please note that portions of this note were completed with a voice recognition program.  Efforts were made to edit the dictations but occasionally words are mis-transcribed.)    Joyce Urbano DO (electronically signed)  Attending Emergency Physician          James Solo DO  02/10/20 [id number]

## 2020-02-11 NOTE — ED NOTES
This RN at bedside to reassess pt. Pt resting quietly in ED cot with no s/s of distress noted.       Geetha Brandon RN  02/10/20 2043

## 2020-02-19 ENCOUNTER — OUTSIDE SERVICES (OUTPATIENT)
Dept: GASTROENTEROLOGY | Age: 60
End: 2020-02-19
Payer: MEDICARE

## 2020-02-19 PROCEDURE — 45380 COLONOSCOPY AND BIOPSY: CPT | Performed by: INTERNAL MEDICINE

## 2020-02-19 PROCEDURE — 43239 EGD BIOPSY SINGLE/MULTIPLE: CPT | Performed by: INTERNAL MEDICINE

## 2020-02-19 NOTE — OP NOTE
demonstrated normal mucosa. The second portion of the duodenum demonstrated normal mucosa. Multiple random biopsies obtained from the duodenum. The scope was then withdrawn back into the stomach, it was decompressed, and the scope was completely withdrawn. Patient was turned for the colonoscopy procedure. A digital rectal examination was performed and revealed negative without mass, lesions or tenderness. The Olympus video colonoscope was placed in the patient's rectum under digital direction and advanced to the cecum. The cecum was identified by characteristic anatomy and ballottment. The prep was good. The ileocecal valve was identified. The scope was then withdrawn back through the cecum, ascending, transverse, descending, sigmoid colon, and rectum. Careful circumferential examination of the mucosa in these areas demonstrated:    FINDINGS: Mucosa appeared normal throughout the colon, random colon biopsies were obtained. Cecum: Normal.  Ascending Colon: Normal.  Hepatic Flexure: Normal.  Transverse Colon:Normal.  Splenic Flexure: Normal.  Descending Colon: Normal.  Sigmoid Colon: Abnormal  multiple scattered diverticula noted. Rectum: Normal.  Retroflexed Views: Rectum did not show internal hemorrhoids. Patient does have medium sized external hemorrhoids appreciated on the retroflexed views. The scope was straightened, the colon was decompressed and the scope was withdrawn from the patient. The patient tolerated the procedure well and was taken to the PACU in good condition.     EBL: None  Complication: None  Specimen Sent: Yes    Impression:    - 2 cm hiatal hernia otherwise normal upper endoscopy, normal colonic mucosa throughout, status post random colon biopsies, mild to moderate sigmoid diverticulosis, small to medium external hemorrhoids    Recommendations:   - Await histology from duodenum and random colon biopsies  - Continue with symptomatic management of diarrhea  - Follow-up

## 2020-02-27 ENCOUNTER — OFFICE VISIT (OUTPATIENT)
Dept: GASTROENTEROLOGY | Age: 60
End: 2020-02-27
Payer: MEDICARE

## 2020-02-27 VITALS
HEART RATE: 95 BPM | WEIGHT: 129 LBS | TEMPERATURE: 97.2 F | HEIGHT: 63 IN | OXYGEN SATURATION: 97 % | BODY MASS INDEX: 22.86 KG/M2

## 2020-02-27 PROCEDURE — 99214 OFFICE O/P EST MOD 30 MIN: CPT | Performed by: INTERNAL MEDICINE

## 2020-02-27 RX ORDER — BUDESONIDE 3 MG/1
9 CAPSULE, COATED PELLETS ORAL EVERY MORNING
Qty: 90 CAPSULE | Refills: 1 | Status: SHIPPED | OUTPATIENT
Start: 2020-02-27 | End: 2020-03-28

## 2020-03-24 RX ORDER — TRAMADOL HYDROCHLORIDE 50 MG/1
50 TABLET ORAL EVERY 8 HOURS PRN
Qty: 60 TABLET | Refills: 0 | Status: SHIPPED | OUTPATIENT
Start: 2020-03-24 | End: 2020-04-23

## 2020-03-24 RX ORDER — AMITRIPTYLINE HYDROCHLORIDE 25 MG/1
25 TABLET, FILM COATED ORAL NIGHTLY
Qty: 30 TABLET | Refills: 2 | Status: SHIPPED | OUTPATIENT
Start: 2020-03-24 | End: 2020-06-03

## 2020-04-01 ENCOUNTER — VIRTUAL VISIT (OUTPATIENT)
Dept: GASTROENTEROLOGY | Age: 60
End: 2020-04-01
Payer: MEDICARE

## 2020-04-01 PROCEDURE — 99213 OFFICE O/P EST LOW 20 MIN: CPT | Performed by: INTERNAL MEDICINE

## 2020-04-01 RX ORDER — BUDESONIDE 3 MG/1
CAPSULE, COATED PELLETS ORAL
COMMUNITY
Start: 2020-03-31 | End: 2020-07-07

## 2020-04-01 NOTE — PROGRESS NOTES
status: Current Every Day Smoker     Packs/day: 0.50     Years: 40.00     Pack years: 20.00     Types: Cigarettes    Smokeless tobacco: Never Used   Substance Use Topics    Alcohol use: No    Drug use: No      Allergies   Allergen Reactions    Latex Anaphylaxis     Rash    Fentanyl Rash     At site of application    Ketoprofen      Rash    Morphine Other (See Comments)     GI upset  Other reaction(s): GI Upset  GI upset    Tetracycline      Rash    Cefaclor Rash     Rash    Tetracyclines & Related Rash   ,   Past Medical History:   Diagnosis Date    Anxiety     Depression     Hyperlipidemia     Hypopotassemia     Lumbago     Osteopenia     PTSD (post-traumatic stress disorder)     Pulmonary embolism (HCC)     4 PE in bilat lungs    Spinal stenosis     congenital   ,   Past Surgical History:   Procedure Laterality Date    HYSTERECTOMY      SPINE SURGERY      cervical x 4, fusions and reconstruction    TONSILLECTOMY      age 3   , No family history on file. PHYSICAL EXAMINATION:  [ INSTRUCTIONS:  \"[x]\" Indicates a positive item  \"[]\" Indicates a negative item  -- DELETE ALL ITEMS NOT EXAMINED]  [x] Alert  [x] Oriented to person/place/time    [x] No apparent distress  [] Toxic appearing    [] Face flushed appearing [x] Sclera clear  [] Lips are cyanotic      [] Breathing appears normal  [] Appears tachypneic      [] Rash on visible skin    [] Cranial Nerves II-XII grossly intact    [] Motor grossly intact in visible upper extremities    [] Motor grossly intact in visible lower extremities    [] Normal Mood  [] Anxious appearing    [] Depressed appearing  [] Confused appearing      [] Poor short term memory  [] Poor long term memory    [] OTHER:      Due to this being a TeleHealth encounter, evaluation of the following organ systems is limited: Vitals/Constitutional/EENT/Resp/CV/GI//MS/Neuro/Skin/Heme-Lymph-Imm.     ASSESSMENT/PLAN:    61 y.o. female with microscopic colitis, symptom

## 2020-04-15 ASSESSMENT — LIFESTYLE VARIABLES: HOW OFTEN DO YOU HAVE A DRINK CONTAINING ALCOHOL: 0

## 2020-04-15 ASSESSMENT — PATIENT HEALTH QUESTIONNAIRE - PHQ9
SUM OF ALL RESPONSES TO PHQ QUESTIONS 1-9: 2
SUM OF ALL RESPONSES TO PHQ QUESTIONS 1-9: 2

## 2020-04-20 ENCOUNTER — OFFICE VISIT (OUTPATIENT)
Dept: FAMILY MEDICINE CLINIC | Age: 60
End: 2020-04-20
Payer: MEDICARE

## 2020-04-20 VITALS — HEIGHT: 64 IN | WEIGHT: 127 LBS | BODY MASS INDEX: 21.68 KG/M2

## 2020-04-20 PROCEDURE — 99213 OFFICE O/P EST LOW 20 MIN: CPT | Performed by: NURSE PRACTITIONER

## 2020-04-20 PROCEDURE — G0438 PPPS, INITIAL VISIT: HCPCS | Performed by: NURSE PRACTITIONER

## 2020-04-20 NOTE — PATIENT INSTRUCTIONS
Personalized Preventive Plan for Polly Early - 4/20/2020  Medicare offers a range of preventive health benefits. Some of the tests and screenings are paid in full while other may be subject to a deductible, co-insurance, and/or copay. Some of these benefits include a comprehensive review of your medical history including lifestyle, illnesses that may run in your family, and various assessments and screenings as appropriate. After reviewing your medical record and screening and assessments performed today your provider may have ordered immunizations, labs, imaging, and/or referrals for you. A list of these orders (if applicable) as well as your Preventive Care list are included within your After Visit Summary for your review. Other Preventive Recommendations:    · A preventive eye exam performed by an eye specialist is recommended every 1-2 years to screen for glaucoma; cataracts, macular degeneration, and other eye disorders. · A preventive dental visit is recommended every 6 months. · Try to get at least 150 minutes of exercise per week or 10,000 steps per day on a pedometer . · Order or download the FREE \"Exercise & Physical Activity: Your Everyday Guide\" from [employer]. Call 3-756.935.5341 or search [employer] online. · You need 7052-9788 mg of calcium and 6984-6040 IU of vitamin D per day. It is possible to meet your calcium requirement with diet alone, but a vitamin D supplement is usually necessary to meet this goal.  · When exposed to the sun, use a sunscreen that protects against both UVA and UVB radiation with an SPF of 30 or greater. Reapply every 2 to 3 hours or after sweating, drying off with a towel, or swimming. · Always wear a seat belt when traveling in a car. Always wear a helmet when riding a bicycle or motorcycle.

## 2020-04-23 PROBLEM — M85.80 OSTEOPENIA: Status: ACTIVE | Noted: 2020-04-23

## 2020-04-29 ENCOUNTER — VIRTUAL VISIT (OUTPATIENT)
Dept: PHYSICAL MEDICINE AND REHAB | Age: 60
End: 2020-04-29
Payer: MEDICARE

## 2020-04-29 VITALS — BODY MASS INDEX: 21.85 KG/M2 | WEIGHT: 128 LBS | HEIGHT: 64 IN

## 2020-04-29 PROBLEM — G95.9 CERVICAL MYELOPATHY (HCC): Status: ACTIVE | Noted: 2020-04-29

## 2020-04-29 PROBLEM — Z79.899 HIGH RISK MEDICATION USE: Status: ACTIVE | Noted: 2020-04-29

## 2020-04-29 PROBLEM — M79.10 MYALGIA: Status: ACTIVE | Noted: 2020-04-29

## 2020-04-29 PROBLEM — N88.2 CERVICAL STENOSIS (UTERINE CERVIX): Status: ACTIVE | Noted: 2020-04-29

## 2020-04-29 PROCEDURE — 99205 OFFICE O/P NEW HI 60 MIN: CPT | Performed by: PHYSICAL MEDICINE & REHABILITATION

## 2020-04-29 RX ORDER — LIDOCAINE 50 MG/G
OINTMENT TOPICAL
Qty: 1 TUBE | Refills: 11 | Status: SHIPPED | OUTPATIENT
Start: 2020-04-29 | End: 2020-08-17 | Stop reason: CLARIF

## 2020-04-29 RX ORDER — TRAMADOL HYDROCHLORIDE 50 MG/1
50 TABLET ORAL 2 TIMES DAILY
COMMUNITY
End: 2020-05-21 | Stop reason: SDUPTHER

## 2020-04-29 SDOH — SOCIAL STABILITY: SOCIAL INSECURITY
WITHIN THE LAST YEAR, HAVE YOU BEEN KICKED, HIT, SLAPPED, OR OTHERWISE PHYSICALLY HURT BY YOUR PARTNER OR EX-PARTNER?: NO

## 2020-04-29 SDOH — ECONOMIC STABILITY: INCOME INSECURITY: HOW HARD IS IT FOR YOU TO PAY FOR THE VERY BASICS LIKE FOOD, HOUSING, MEDICAL CARE, AND HEATING?: NOT HARD AT ALL

## 2020-04-29 SDOH — ECONOMIC STABILITY: TRANSPORTATION INSECURITY
IN THE PAST 12 MONTHS, HAS LACK OF TRANSPORTATION KEPT YOU FROM MEETINGS, WORK, OR FROM GETTING THINGS NEEDED FOR DAILY LIVING?: NO

## 2020-04-29 SDOH — SOCIAL STABILITY: SOCIAL NETWORK
IN A TYPICAL WEEK, HOW MANY TIMES DO YOU TALK ON THE PHONE WITH FAMILY, FRIENDS, OR NEIGHBORS?: MORE THAN THREE TIMES A WEEK

## 2020-04-29 SDOH — HEALTH STABILITY: PHYSICAL HEALTH: ON AVERAGE, HOW MANY DAYS PER WEEK DO YOU ENGAGE IN MODERATE TO STRENUOUS EXERCISE (LIKE A BRISK WALK)?: 0 DAYS

## 2020-04-29 SDOH — SOCIAL STABILITY: SOCIAL INSECURITY
WITHIN THE LAST YEAR, HAVE TO BEEN RAPED OR FORCED TO HAVE ANY KIND OF SEXUAL ACTIVITY BY YOUR PARTNER OR EX-PARTNER?: NO

## 2020-04-29 SDOH — ECONOMIC STABILITY: FOOD INSECURITY: WITHIN THE PAST 12 MONTHS, THE FOOD YOU BOUGHT JUST DIDN'T LAST AND YOU DIDN'T HAVE MONEY TO GET MORE.: NEVER TRUE

## 2020-04-29 SDOH — HEALTH STABILITY: MENTAL HEALTH
STRESS IS WHEN SOMEONE FEELS TENSE, NERVOUS, ANXIOUS, OR CAN'T SLEEP AT NIGHT BECAUSE THEIR MIND IS TROUBLED. HOW STRESSED ARE YOU?: TO SOME EXTENT

## 2020-04-29 SDOH — SOCIAL STABILITY: SOCIAL NETWORK: HOW OFTEN DO YOU ATTENT MEETINGS OF THE CLUB OR ORGANIZATION YOU BELONG TO?: NEVER

## 2020-04-29 SDOH — ECONOMIC STABILITY: FOOD INSECURITY: WITHIN THE PAST 12 MONTHS, YOU WORRIED THAT YOUR FOOD WOULD RUN OUT BEFORE YOU GOT MONEY TO BUY MORE.: NEVER TRUE

## 2020-04-29 SDOH — SOCIAL STABILITY: SOCIAL NETWORK
DO YOU BELONG TO ANY CLUBS OR ORGANIZATIONS SUCH AS CHURCH GROUPS UNIONS, FRATERNAL OR ATHLETIC GROUPS, OR SCHOOL GROUPS?: NO

## 2020-04-29 SDOH — SOCIAL STABILITY: SOCIAL NETWORK: HOW OFTEN DO YOU ATTEND CHURCH OR RELIGIOUS SERVICES?: 1 TO 4 TIMES PER YEAR

## 2020-04-29 SDOH — ECONOMIC STABILITY: TRANSPORTATION INSECURITY
IN THE PAST 12 MONTHS, HAS THE LACK OF TRANSPORTATION KEPT YOU FROM MEDICAL APPOINTMENTS OR FROM GETTING MEDICATIONS?: NO

## 2020-04-29 SDOH — HEALTH STABILITY: PHYSICAL HEALTH: ON AVERAGE, HOW MANY MINUTES DO YOU ENGAGE IN EXERCISE AT THIS LEVEL?: 0 MIN

## 2020-04-29 SDOH — SOCIAL STABILITY: SOCIAL INSECURITY: WITHIN THE LAST YEAR, HAVE YOU BEEN HUMILIATED OR EMOTIONALLY ABUSED IN OTHER WAYS BY YOUR PARTNER OR EX-PARTNER?: NO

## 2020-04-29 SDOH — SOCIAL STABILITY: SOCIAL NETWORK: ARE YOU MARRIED, WIDOWED, DIVORCED, SEPARATED, NEVER MARRIED, OR LIVING WITH A PARTNER?: MARRIED

## 2020-04-29 SDOH — SOCIAL STABILITY: SOCIAL NETWORK: HOW OFTEN DO YOU GET TOGETHER WITH FRIENDS OR RELATIVES?: ONCE A WEEK

## 2020-04-29 SDOH — SOCIAL STABILITY: SOCIAL INSECURITY: WITHIN THE LAST YEAR, HAVE YOU BEEN AFRAID OF YOUR PARTNER OR EX-PARTNER?: NO

## 2020-04-29 ASSESSMENT — ENCOUNTER SYMPTOMS
SORE THROAT: 0
BOWEL INCONTINENCE: 0
BACK PAIN: 1
STRIDOR: 0
EYE PAIN: 0
DIARRHEA: 0
WHEEZING: 0
PHOTOPHOBIA: 0
NAUSEA: 0
EYE REDNESS: 0
BLOOD IN STOOL: 0
COUGH: 0
ABDOMINAL PAIN: 0
VOMITING: 0
SHORTNESS OF BREATH: 1
CONSTIPATION: 1

## 2020-04-29 NOTE — PROGRESS NOTES
pain radiates to the right thigh. The pain is at a severity of 8/10. The pain is severe. The pain is worse during the day. The symptoms are aggravated by bending. Stiffness is present in the morning. Associated symptoms include leg pain, numbness, paresthesias, tingling and weakness. Pertinent negatives include no abdominal pain, bladder incontinence, bowel incontinence, chest pain, dysuria, fever, headaches, paresis, pelvic pain, perianal numbness or weight loss. Risk factors include sedentary lifestyle, menopause and lack of exercise. She has tried heat, analgesics, chiropractic manipulation, ice, muscle relaxant, home exercises, NSAIDs and walking for the symptoms. The treatment provided mild relief. Neck Pain    This is a chronic problem. The current episode started more than 1 year ago. The problem occurs constantly. The problem has been unchanged. The pain is present in the occipital region and right side. The symptoms are aggravated by twisting. The pain is same all the time. Stiffness is present in the morning. Associated symptoms include leg pain, numbness, tingling and weakness. Pertinent negatives include no chest pain, fever, headaches, paresis, photophobia or weight loss. She has tried chiropractic manipulation, heat, home exercises, acetaminophen, bed rest, ice, muscle relaxants, neck support, NSAIDs and oral narcotics for the symptoms. The treatment provided mild relief. Arm Pain    The incident occurred more than 1 week ago. There was no injury mechanism. The pain is present in the right clavicle. The quality of the pain is described as cramping and aching. The pain is at a severity of 7/10. The pain is severe. The pain has been fluctuating since the incident. Associated symptoms include numbness and tingling. Pertinent negatives include no chest pain. The symptoms are aggravated by movement. She has tried NSAIDs, immobilization, elevation, acetaminophen and rest for the symptoms.  The treatment provided moderate relief. Past Medical History:   Diagnosis Date    Anxiety     Depression     Hyperlipidemia     Hypopotassemia     Lumbago     Osteopenia     PTSD (post-traumatic stress disorder)     Pulmonary embolism (HCC)     4 PE in bilat lungs    Spinal stenosis     congenital       Past Surgical History:   Procedure Laterality Date    HYSTERECTOMY      SPINE SURGERY      cervical x 4, fusions and reconstruction    TONSILLECTOMY      age 3       Social History     Socioeconomic History    Marital status:      Spouse name: None    Number of children: 1    Years of education: None    Highest education level: None   Occupational History    Occupation: On disability     Comment: Used to work as a  and did odd jobs   Social Needs    Financial resource strain: Not hard at all   [email or web address] insecurity     Worry: Never true     Inability: Never true    Transportation needs     Medical: No     Non-medical: No   Tobacco Use    Smoking status: Current Every Day Smoker     Packs/day: 0.50     Years: 40.00     Pack years: 20.00     Types: Cigarettes    Smokeless tobacco: Never Used   Substance and Sexual Activity    Alcohol use: No    Drug use: Not Currently     Types: Marijuana     Comment: stopped about 10 months ago , prescribed by unknown dr Rabago Sexual activity: None   Lifestyle    Physical activity     Days per week: 0 days     Minutes per session: 0 min    Stress:  To some extent   Relationships    Social connections     Talks on phone: More than three times a week     Gets together: Once a week     Attends Islam service: 1 to 4 times per year     Active member of club or organization: No     Attends meetings of clubs or organizations: Never     Relationship status:     Intimate partner violence     Fear of current or ex partner: No     Emotionally abused: No     Physically abused: No     Forced sexual activity: No   Other Topics Concern    None   Social medications. Previous notes, comprehensive past medical, surgical, family history, and diagnostics were reviewed. Patient education and councelling were provided regarding off label use,treatment options and medication and injection risks. Current and old OARRS ([place] Automated Prescription Reporting System) records reviewed, all refills reviewed since last visit,  Behavioral agreement/BALDEV regulations   and Toxicology screen was reviewed with patient and is up to date. There are no current red flags. They are making good progress regarding pain relief, they are performing at a functional level regarding activities of daily living, family interactions and psychological functioning, they're not having any adverse effects or side effects from the current medications, and I see no findings of aberrant drug taking or addiction related behaviors. The patient is aware that they have a chronic pain condition and they may require opiates dosing for life. All efforts will be made to wean to the lowest effective dose. Other therapies for pain have not been effective including nonopiate medications. Injections and exercises are only partially effective. A Rx for Narcan was offered to help prevent accidental overdose. No flowsheet data found. Patient is currently taking:       I have discontinued Rodriguez Back. José Miguel's Na Sulfate-K Sulfate-Mg Sulf. I am also having her start on lidocaine and Cervical Collar Pediatric. Additionally, I am having her maintain her topiramate, DULoxetine, cholestyramine, amitriptyline, budesonide, and traMADol. I also recommend the following Medications:    Orders Placed This Encounter   Medications    lidocaine (XYLOCAINE) 5 % ointment     Sig: Apply topically as needed. Dispense:  1 Tube     Refill:  11    Elastic Bandages & Supports (CERVICAL COLLAR PEDIATRIC) MISC     Sig: As tolerated and as needed for pain.      Dispense:  1 each     Refill:  1 -which helps with pain and function. Otherwise, continue the current pain medications that I have prescibed. Radiologic:   Old films reviewed MRI of cervical spine dated September 2019 shows DDD DJD throughout her cervical spine and post decompressive laminectomy at C4-C5.,     I discussed results with patients. see Follow up plans below  For any new studies. Care Everywhere Updates:  requested and reviewed. [facility] PAIN:  Pain Yes. Location: arms>neck, rates pain a 8 on a pain scale of 1-10. Patient describes pain as tightness and soreness in neck, duration to the present time occuring daily. Left hand keeps locking up. Arm is always on verge of locking up. After tilting head back to a certain point, she gets pain in right elbow area  When she wakes up, she's in pain. Impression:  (G89.4) Chronic pain syndrome (primary encounter diagnosis)  (M47.12) Cervical spondylosis with myelopathy  (M54.17) Lumbosacral radiculopathy due to trauma  (W69) Psychological factor affecting physical condition    Plan:  Reviewed MRI of cervical spine with the patient. There is no new findings. She is going to [place] until next April. Signed Prescriptions Disp Refills   topiramate (TOPAMAX) 100 mg tablet 180 tablet 1   Sig: Take 1 tablet by mouth twice daily. RAJINDER: No   baclofen (LIORESAL) 20 mg tablet 270 tablet 1   Sig: Take 1 tablet by mouth three times daily. RAJINDER: No     Discussed about cervical spinal cord stimulator implant. I can refer her to neurosurgeon Dr. Cherelle Collins for cervical spinal cord stimulator implant. She can also benefit from Chronic pain recover program. However she is not interested at this point. Follow up 6 months for for an office visit. Discussed treatment plan with patient and answered the patient's questions. Rosa M Duque MD  -[facility]--she is not pursuing this option--too big of a surgery. No new issues noted.           Electrodiagnostic:  Previous studies medical marijuana card--did not help--stopped using. Follow up with Primary Care Physician regarding their general medical needs. Stressed the importance of following up with PCP and specialists for his/her chronic diseases, health, CV, and cancer screening and continued care. Will follow disease activity/progression and adjust therapeutic regimen to disease activity and severity. Discussed medication dosage, usage, goals of therapy, and side effects. Available test results were reviewed -Discussed findings, impression and plan with patient. An additional 20 minutes were spent outside of the patient visit to review records. Additional time spent with the patient to discuss their questions. Additional time spent with the patient devoted to discussing treatment strategy, planning, and implementation. Patient understands above plan; questions asked and answered. Patient agrees to plan as noted above. F/U in 2-3months  At least 50% of the visit was involved in the discussion of the options for treatment. We discussed exercises, medication, interventional therapies and surgery. Healthy life style is essential with patient hard work to achieve the wellness. In addition; discussion with the patient and/or family about any of the diagnostic results, impressions and/or recommended diagnostic studies, prognosis, risks and benefits of treatment options, instructions for treatment and/or follow-up, importance of compliance with chosen treatment options, risk-factor reduction, and patient/family education. They are to follow up in 2 months to review medication, efficacy of injections, pill counts, OARRS check, SOAPPR assessment, review diagnostics, to review previous and future treatment plans and assess appropriateness for continued therapy.         New Diagnostics  Orders Placed This Encounter   Procedures    Urine Drug Screen    Ambulatory referral to Occupational Therapy       Follow-up in 2-1/2

## 2020-05-05 ENCOUNTER — TELEPHONE (OUTPATIENT)
Dept: FAMILY MEDICINE CLINIC | Age: 60
End: 2020-05-05

## 2020-05-06 ENCOUNTER — VIRTUAL VISIT (OUTPATIENT)
Dept: BEHAVIORAL/MENTAL HEALTH CLINIC | Age: 60
End: 2020-05-06
Payer: MEDICARE

## 2020-05-06 PROCEDURE — 90791 PSYCH DIAGNOSTIC EVALUATION: CPT | Performed by: PSYCHOLOGIST

## 2020-05-06 ASSESSMENT — PATIENT HEALTH QUESTIONNAIRE - PHQ9
5. POOR APPETITE OR OVEREATING: 2
4. FEELING TIRED OR HAVING LITTLE ENERGY: 3
8. MOVING OR SPEAKING SO SLOWLY THAT OTHER PEOPLE COULD HAVE NOTICED. OR THE OPPOSITE, BEING SO FIGETY OR RESTLESS THAT YOU HAVE BEEN MOVING AROUND A LOT MORE THAN USUAL: 0
6. FEELING BAD ABOUT YOURSELF - OR THAT YOU ARE A FAILURE OR HAVE LET YOURSELF OR YOUR FAMILY DOWN: 1
1. LITTLE INTEREST OR PLEASURE IN DOING THINGS: 3
SUM OF ALL RESPONSES TO PHQ QUESTIONS 1-9: 16
2. FEELING DOWN, DEPRESSED OR HOPELESS: 3
3. TROUBLE FALLING OR STAYING ASLEEP: 1
9. THOUGHTS THAT YOU WOULD BE BETTER OFF DEAD, OR OF HURTING YOURSELF: 1
SUM OF ALL RESPONSES TO PHQ QUESTIONS 1-9: 16
10. IF YOU CHECKED OFF ANY PROBLEMS, HOW DIFFICULT HAVE THESE PROBLEMS MADE IT FOR YOU TO DO YOUR WORK, TAKE CARE OF THINGS AT HOME, OR GET ALONG WITH OTHER PEOPLE: 3
SUM OF ALL RESPONSES TO PHQ9 QUESTIONS 1 & 2: 6
7. TROUBLE CONCENTRATING ON THINGS, SUCH AS READING THE NEWSPAPER OR WATCHING TELEVISION: 2

## 2020-05-06 NOTE — PROGRESS NOTES
have been present for the past several years and have been worsening. Pt reports that 6 years ago her best friend of 40 years  of a heart attack. They were extremely close, [place] was everything to me. \"  She did some grief counseling after this, which she found helpful, but her therapist retired. In 2017 her only child  of a heroin overdose 4 days after he got out of rehab. Several weeks after her son's death, her brother (who she was very close to) . A few months later her father-in-law . She reports that everyday she thinks about the people she has lost. She reports she used to be a compassionate, outgoing, busy person. She reports she does not feel like she is living a life anymore. She reports that doing anything feels like a \"major\" task. Pt reports that she was seeing a pain management doctor who had her on multiple opiates. He was \"shut down\" in 2018. She then could not get in with any other doctor for pain medication and had to go to detox. She describes that this was a very difficult time, both mentally and physically. She reports that her  had to stay home to take care of her after detox and then he lost his job. He was able to get disability eventually but the financial strain was very stressful as well. They sold their home in September in  in [place] because she could not take care of it anymore. They are now living in a mobile home in [place] and she \"hates\" it. She reports that her  is a wonderful person and is very understanding. She reports that her mom is concerned about her. Pt reports that she also has 3 grandchildren and a great grandchild    She was very sick with a form of colitis since December, she has been getting better over the past two months. Pt reports some morbid ideation, but she specifically denies any plan or intent to harm herself. She has the thought \"I don't want to be here. \"  She reports she goes providers        Pt Behavioral Change Plan:  Follow up in 1 week    Please note this report has been partially produced using speech recognition software and may cause contain errors related to that system including grammar, punctuation and spelling as well as words and phrases that may seem inappropriate. If there are questions or concerns please feel free to contact me to clarify.

## 2020-05-07 ENCOUNTER — VIRTUAL VISIT (OUTPATIENT)
Dept: GASTROENTEROLOGY | Age: 60
End: 2020-05-07
Payer: MEDICARE

## 2020-05-07 PROCEDURE — 99213 OFFICE O/P EST LOW 20 MIN: CPT | Performed by: INTERNAL MEDICINE

## 2020-05-07 RX ORDER — BUDESONIDE 3 MG/1
6 CAPSULE, COATED PELLETS ORAL 2 TIMES DAILY
Qty: 60 CAPSULE | Refills: 1 | Status: SHIPPED | OUTPATIENT
Start: 2020-05-07 | End: 2020-06-02

## 2020-05-07 RX ORDER — WHEAT DEXTRIN 3 G/3.8 G
4 POWDER (GRAM) ORAL
COMMUNITY

## 2020-05-07 NOTE — PROGRESS NOTES
Elastic Bandages & Supports (CERVICAL COLLAR PEDIATRIC) MISC As tolerated and as needed for pain. Yes Nicolle Sanchez DO   budesonide (ENTOCORT EC) 3 MG extended release capsule  Yes Historical Provider, MD   amitriptyline (ELAVIL) 25 MG tablet Take 1 tablet by mouth nightly Yes RINA Chris CNP   DULoxetine (CYMBALTA) 60 MG extended release capsule Take 1 capsule by mouth daily Yes RINA Chris CNP   topiramate (TOPAMAX) 100 MG tablet Take 100 mg by mouth 2 times daily Yes Historical Provider, MD   cholestyramine (QUESTRAN) 4 g packet Take 1 packet by mouth 2 times daily  Patient not taking: Reported on 5/7/2020  Holley Barry DO     Past surgical history, past medical history, family history and allergy history reviewed today    PHYSICAL EXAMINATION:  [ INSTRUCTIONS:  \"[x]\" Indicates a positive item  \"[]\" Indicates a negative item  -- DELETE ALL ITEMS NOT EXAMINED]  [x] Alert  [x] Oriented to person/place/time    [x] No apparent distress  [] Toxic appearing    [] Face flushed appearing [] Sclera clear  [] Lips are cyanotic      [] Breathing appears normal  [] Appears tachypneic      [] Rash on visible skin    [] Cranial Nerves II-XII grossly intact    [] Motor grossly intact in visible upper extremities    [] Motor grossly intact in visible lower extremities    [x] Normal Mood  [] Anxious appearing    [] Depressed appearing  [] Confused appearing      [] Poor short term memory  [] Poor long term memory    [] OTHER:      Due to this being a TeleHealth encounter, evaluation of the following organ systems is limited: Vitals/Constitutional/EENT/Resp/CV/GI//MS/Neuro/Skin/Heme-Lymph-Imm. ASSESSMENT/PLAN:    61 y.o. female with no diagnosis of microscopic/lymphocytic colitis. Patient has completed 2 months of Entocort 9 mg daily. Ongoing loose bowel movements.   No other alarm symptoms    -Continue with fiber supplementation  -Decrease Entocort to 6 mg daily  -Smoking cessation

## 2020-05-08 ENCOUNTER — HOSPITAL ENCOUNTER (OUTPATIENT)
Dept: OCCUPATIONAL THERAPY | Age: 60
Setting detail: THERAPIES SERIES
Discharge: HOME | End: 2020-05-08
Payer: MEDICARE

## 2020-05-08 PROCEDURE — 97166 OT EVAL MOD COMPLEX 45 MIN: CPT

## 2020-05-08 NOTE — PROGRESS NOTES
4, fusions and reconstruction    TONSILLECTOMY      age 3     Allergies   Allergen Reactions    Latex Anaphylaxis     Rash    Fentanyl Rash     At site of application    Ketoprofen      Rash    Morphine Other (See Comments)     GI upset  Other reaction(s): GI Upset  GI upset    Tetracycline      Rash    Cefaclor Rash     Rash    Tetracyclines & Related Rash               DIAGNOSTIC IMAGING: within chart                 MEDICATIONS:  Current Outpatient Medications:     Wheat Dextrin (BENEFIBER) POWD, Take 4 g by mouth 3 times daily (with meals), Disp: , Rfl:     budesonide (ENTOCORT EC) 3 MG extended release capsule, Take 2 capsules by mouth 2 times daily, Disp: 60 capsule, Rfl: 1    traMADol (ULTRAM) 50 MG tablet, Take 50 mg by mouth 2 times daily. , Disp: , Rfl:     lidocaine (XYLOCAINE) 5 % ointment, Apply topically as needed. , Disp: 1 Tube, Rfl: 11    Elastic Bandages & Supports (CERVICAL COLLAR PEDIATRIC) MISC, As tolerated and as needed for pain., Disp: 1 each, Rfl: 1    budesonide (ENTOCORT EC) 3 MG extended release capsule, , Disp: , Rfl:     amitriptyline (ELAVIL) 25 MG tablet, Take 1 tablet by mouth nightly, Disp: 30 tablet, Rfl: 2    cholestyramine (QUESTRAN) 4 g packet, Take 1 packet by mouth 2 times daily (Patient not taking: Reported on 5/7/2020), Disp: 90 packet, Rfl: 3    DULoxetine (CYMBALTA) 60 MG extended release capsule, Take 1 capsule by mouth daily, Disp: 90 capsule, Rfl: 1    topiramate (TOPAMAX) 100 MG tablet, Take 100 mg by mouth 2 times daily, Disp: , Rfl:     PRECAUTIONS: None per patient report         ENGLISH PRIMARY LANGUAGE: Yes    TRANSFER OF PT CARE REQUIRED: no, Schedule pt with current OTR only                                                         HAND DOMINANCE: right    PRIOR LEVEL OF FUNCTION:  [x] Patient performing at independent level for ADL and IADL activities:    [] Patient receiving assistance for ADL and IADL activities:    [x] Other:Pt reports that she is often\" sick \" with colitis and has difficulty going shopping  And errands and sometimes doing her self care due to fatigue  Comments:                                        DATE OF SX OR INJURY: not recently had injury or surgery    PREVIOUS/CURRENT TREATMENT FOR SAME PROBLEM:    []   YES   [x] NO Has had PT in the past \"that hasn't been successful\"       SUBJECTIVE: Pt is more concerned  About Neck and R arm pain. No C/O back pain today    CHIEF COMPLAINT: Neck and R arm pain    SOCIAL SUPPORT:   Support contact:        Pt lives: spouse  Home:  single level house  Entrance: 3 stairs into the house,   Bathroom: tub/shower combo   DME: None     ADL/HOBBIES/ROUTINES: Pt reports that her Self care fatigues her ,  And that she likes to read but \"the books or tablet get too heavy to hold\"    PAIN SCALE:      5  /10 R arm and neck                       LOCATION: R arm and neck /posterior cervical region    OBJECTIVE: Pt has had pain long term, currently has no regular HEP that she is doing to reduce pain. UE AROM- WFL but poor endurance on the R.  MMT Pt could not tolerate due to testing pressure increased cervical pain. OSWESTRY Disability Index  Oswestry Disability Index   Pain Intensity: C. The pain is moderate at the moment  [facility], etc.): C. It is painful to look after myself and I am slow and careful  Lifting: E. I can lift very light weights  Walking: D. Pain prevents me from walking more than 100 yards  Sitting: C. Pain prevents me from sitting more than 1 hour  Standing: E. Pain prevents me from standing more than 10 minutes  Sleeping: C.  Because of pain I have less than 6 hours sleep  Sex Life (if applicable) : E. My sex life is nearly absent because of pain  Social Life : E. Pain has restricted my social life to my home  Traveling : E. Pain restricts me to short necessary journeys under 30 minutes  Owsestry Disability Total Scores: 31  Oswestry CMS Modifier: [address]  Oswestry Disability Scores %: 0      POSTURAL EXAM/COMPENSATION: In standing Pt was noted to have a R to L trunk /Pelvic obliquity along with R trunk compression    LUMBAR/SACROILIAC ASSESSMENT:  B anterior rotation of sanam in standing and supine    LEG LENGTH COMPARISON:no discrepancy noted today but has had R LE shortening in the past and was given heel lift which increased her pain level. NEUROLOGICAL ASSESSMENT: No visible neuro signs today    SOFT TISSUE ASSESSMENT/MOBILITY:Severe fascial restrictions throughout the posterior and lateral cervical region as well as the L shoulder and elbow. SCAR/LOCATION/MOBILITY Poor mobility of cervical surgery scars    EDUCATION/BARRIERS TO LEARNING:     Barriers:adaptive equipment to conserve energy   Education on this date: stress and grief management, OT role and POC, deceasing fascial restrictions , decreasing pain , education/training  and leisure       FALLS: see falls risk assessment form No falls    COMMENTS/CLINICAL IMPRESSIONS: Pt has had multiple cervical fixation surgeries resulting in long standing pain and fascial restrictions. Pt also demonstrates postural holding patterns that reduce postural efficiency and increase pain. Pt sites depression /grief and this also increases her pain and immobility. Pt appears to lack postural awareness and education in energy conservation work simplification and body mechanics for daily activities and ADL. Pt also lacks knowledge and application of relaxation principles and stress management skills. TX PLAN:  2X WEEK X 5 WEEKS OR  10 VISITS. Complexity:       []  Low Complexity  ¨ History: Brief history including review of medical records relating to the problem  ¨ Exam: 1-3 performance Deficits  ¨ Assistance/Modification: No assistance or modifications required to perform tasks.  No comorbities affecting occupational performance  [x]  Medium Complexity  ¨ History: Expanded review of medical records and TIME OUT 1600                           TOTAL MINUTES 60                                  Electronically signed by:  RADHA Portillo                    Date: 5/8/2020, 3:02 PM    Electronically signed by RADHA Portillo on 5/8/2020 at 5:09 PM

## 2020-05-13 ENCOUNTER — APPOINTMENT (OUTPATIENT)
Dept: OCCUPATIONAL THERAPY | Age: 60
End: 2020-05-13
Payer: MEDICARE

## 2020-05-13 ENCOUNTER — HOSPITAL ENCOUNTER (OUTPATIENT)
Dept: OCCUPATIONAL THERAPY | Age: 60
Setting detail: THERAPIES SERIES
Discharge: HOME | End: 2020-05-13
Payer: MEDICARE

## 2020-05-13 PROCEDURE — 97530 THERAPEUTIC ACTIVITIES: CPT

## 2020-05-13 PROCEDURE — 97140 MANUAL THERAPY 1/> REGIONS: CPT

## 2020-05-14 ENCOUNTER — PATIENT MESSAGE (OUTPATIENT)
Dept: BEHAVIORAL/MENTAL HEALTH CLINIC | Age: 60
End: 2020-05-14

## 2020-05-20 ENCOUNTER — VIRTUAL VISIT (OUTPATIENT)
Dept: BEHAVIORAL/MENTAL HEALTH CLINIC | Age: 60
End: 2020-05-20
Payer: MEDICARE

## 2020-05-20 PROCEDURE — 90832 PSYTX W PT 30 MINUTES: CPT | Performed by: PSYCHOLOGIST

## 2020-05-20 ASSESSMENT — PATIENT HEALTH QUESTIONNAIRE - PHQ9
SUM OF ALL RESPONSES TO PHQ9 QUESTIONS 1 & 2: 4
SUM OF ALL RESPONSES TO PHQ QUESTIONS 1-9: 10
2. FEELING DOWN, DEPRESSED OR HOPELESS: 2
8. MOVING OR SPEAKING SO SLOWLY THAT OTHER PEOPLE COULD HAVE NOTICED. OR THE OPPOSITE, BEING SO FIGETY OR RESTLESS THAT YOU HAVE BEEN MOVING AROUND A LOT MORE THAN USUAL: 0
1. LITTLE INTEREST OR PLEASURE IN DOING THINGS: 2
4. FEELING TIRED OR HAVING LITTLE ENERGY: 2
10. IF YOU CHECKED OFF ANY PROBLEMS, HOW DIFFICULT HAVE THESE PROBLEMS MADE IT FOR YOU TO DO YOUR WORK, TAKE CARE OF THINGS AT HOME, OR GET ALONG WITH OTHER PEOPLE: 1
3. TROUBLE FALLING OR STAYING ASLEEP: 1
9. THOUGHTS THAT YOU WOULD BE BETTER OFF DEAD, OR OF HURTING YOURSELF: 0
7. TROUBLE CONCENTRATING ON THINGS, SUCH AS READING THE NEWSPAPER OR WATCHING TELEVISION: 1
SUM OF ALL RESPONSES TO PHQ QUESTIONS 1-9: 10
6. FEELING BAD ABOUT YOURSELF - OR THAT YOU ARE A FAILURE OR HAVE LET YOURSELF OR YOUR FAMILY DOWN: 1
5. POOR APPETITE OR OVEREATING: 1

## 2020-05-20 NOTE — PROGRESS NOTES
which she has thoughts of death. She continues to deny suicidal ideation. Discussed spiral of depression and ways in which thoughts and behaviors may be contributing to current depressive symptoms. Also discussed relationship with  and increasing communication with him. O:  MSE:  Appearance    alert, cooperative   Personal Hygiene : appropriately dressed, appropriately groomed and healthy looking  Appetite abnormal: fluctuates  Sleep disturbance Yes, including: non-restful sleep  Fatigue Yes  Loss of pleasure Yes  Impulsive behavior No  Speech    spontaneous, normal rate and normal volume  Mood   depressed   Affect    depressed affect  Thought Content    intact and helplessness  Thought Process    linear, goal directed and coherent  Associations    logical connections  Insight    fair  Judgment    fair  Orientation    oriented to person, place, time, and general circumstances  Memory    recent and remote memory intact  Attention/Concentration    impaired  Morbid ideation Yes  Suicide Assessment    no suicidal ideation       History:    Medications:   Current Outpatient Medications   Medication Sig Dispense Refill    Wheat Dextrin (BENEFIBER) POWD Take 4 g by mouth 3 times daily (with meals)      budesonide (ENTOCORT EC) 3 MG extended release capsule Take 2 capsules by mouth 2 times daily 60 capsule 1    traMADol (ULTRAM) 50 MG tablet Take 50 mg by mouth 2 times daily.  lidocaine (XYLOCAINE) 5 % ointment Apply topically as needed. 1 Tube 11    Elastic Bandages & Supports (CERVICAL COLLAR PEDIATRIC) MISC As tolerated and as needed for pain.  1 each 1    budesonide (ENTOCORT EC) 3 MG extended release capsule       amitriptyline (ELAVIL) 25 MG tablet Take 1 tablet by mouth nightly 30 tablet 2    cholestyramine (QUESTRAN) 4 g packet Take 1 packet by mouth 2 times daily (Patient not taking: Reported on 5/7/2020) 90 packet 3    DULoxetine (CYMBALTA) 60 MG extended release capsule Take 1 capsule by mouth daily 90 capsule 1    topiramate (TOPAMAX) 100 MG tablet Take 100 mg by mouth 2 times daily       No current facility-administered medications for this visit. Social History:   Social History     Socioeconomic History    Marital status:      Spouse name: Not on file    Number of children: 1    Years of education: Not on file    Highest education level: Not on file   Occupational History    Occupation: On disability     Comment: Used to work as a  and did odd jobs   Social Needs    Financial resource strain: Not hard at all   Bruna-Aly insecurity     Worry: Never true     Inability: Never true    Transportation needs     Medical: No     Non-medical: No   Tobacco Use    Smoking status: Current Every Day Smoker     Packs/day: 0.50     Years: 40.00     Pack years: 20.00     Types: Cigarettes    Smokeless tobacco: Never Used   Substance and Sexual Activity    Alcohol use: No    Drug use: Not Currently     Types: Marijuana     Comment: stopped about 10 months ago , prescribed by unknown dr Maggie Davison Sexual activity: Not on file   Lifestyle    Physical activity     Days per week: 0 days     Minutes per session: 0 min    Stress: To some extent   Relationships    Social connections     Talks on phone: More than three times a week     Gets together: Once a week     Attends Judaism service: 1 to 4 times per year     Active member of club or organization: No     Attends meetings of clubs or organizations: Never     Relationship status:     Intimate partner violence     Fear of current or ex partner: No     Emotionally abused: No     Physically abused: No     Forced sexual activity: No   Other Topics Concern    Not on file   Social History Narrative    Lives at Home with her . [facility] in  [facility] [address]     Had 1 son who is now  approximately 3 years ago. TOBACCO:   reports that she has been smoking cigarettes.  She has a 20.00 pack-year smoking history. She has never used smokeless tobacco.  ETOH:   reports no history of alcohol use. Family History:   Family History   Problem Relation Age of Onset    Celiac Disease Neg Hx     Crohn's Disease Neg Hx          A:  Administered the PHQ9 which indicates a self report of moderate symptom distress. Pt would benefit from [facility] services to increase coping skills to provide symptom management/control/relief. PHQ Scores 5/20/2020 5/6/2020 4/15/2020 8/9/2018   PHQ2 Score 4 6 2 1   PHQ9 Score 10 16 2 1     Interpretation of Total Score Depression Severity: 1-4 = Minimal depression, 5-9 = Mild depression, 10-14 = Moderate depression, 15-19 = Moderately severe depression, 20-27 = Severe depression      Diagnosis:  Major depressive disorder; recurrent           Plan:  Pt interventions:  Provided handout on  depression, Discussed various factors related to the development and maintenance of  depression (including biological, cognitive, behavioral, and environmental factors), Motivational Interviewing to determine importance and readiness for change, [place]-setting to identify pt's primary goals for [facility] visit / overall health, Supportive techniques, Emphasized self-care as important for managing overall health and Identified maladaptive thoughts      Pt Behavioral Change Plan:    Review hand out on depression  Rate mood daily   Follow up in 1 week  Please note this report has been partially produced using speech recognition software  And may cause contain errors related to that system including grammar, punctuation and spelling as well as words and phrases that may seem inappropriate. If there are questions or concerns please feel free to contact me to clarify.

## 2020-05-21 RX ORDER — TRAMADOL HYDROCHLORIDE 50 MG/1
50 TABLET ORAL EVERY 8 HOURS PRN
Qty: 60 TABLET | Refills: 0 | Status: SHIPPED | OUTPATIENT
Start: 2020-05-21 | End: 2020-06-20

## 2020-05-21 NOTE — TELEPHONE ENCOUNTER
Pt calling Dr Rasta Colón will not fill until face to face appt. Pt not scheduled yet office will call top schedule her.  Was told to call pcp for refill      Pt [phone]

## 2020-05-26 DIAGNOSIS — M47.12 CERVICAL SPONDYLOSIS WITH MYELOPATHY: ICD-10-CM

## 2020-05-26 DIAGNOSIS — M54.12 BRACHIAL NEURITIS OR RADICULITIS: ICD-10-CM

## 2020-05-26 DIAGNOSIS — N88.2 CERVICAL STENOSIS (UTERINE CERVIX): ICD-10-CM

## 2020-05-26 DIAGNOSIS — M54.40 BILATERAL LOW BACK PAIN WITH SCIATICA, SCIATICA LATERALITY UNSPECIFIED, UNSPECIFIED CHRONICITY: ICD-10-CM

## 2020-05-26 DIAGNOSIS — Z79.899 HIGH RISK MEDICATION USE: ICD-10-CM

## 2020-05-26 DIAGNOSIS — F33.2 SEVERE EPISODE OF RECURRENT MAJOR DEPRESSIVE DISORDER, WITHOUT PSYCHOTIC FEATURES (HCC): ICD-10-CM

## 2020-05-26 DIAGNOSIS — M96.1 POSTLAMINECTOMY SYNDROME, CERVICAL REGION: ICD-10-CM

## 2020-05-26 LAB
AMPHETAMINE SCREEN, URINE: ABNORMAL
BARBITURATE SCREEN URINE: ABNORMAL
BENZODIAZEPINE SCREEN, URINE: ABNORMAL
CANNABINOID SCREEN URINE: POSITIVE
COCAINE METABOLITE SCREEN URINE: ABNORMAL
Lab: ABNORMAL
METHADONE SCREEN, URINE: ABNORMAL
OPIATE SCREEN URINE: ABNORMAL
OXYCODONE URINE: ABNORMAL
PHENCYCLIDINE SCREEN URINE: ABNORMAL
PROPOXYPHENE SCREEN: ABNORMAL

## 2020-05-27 ENCOUNTER — TELEPHONE (OUTPATIENT)
Dept: GASTROENTEROLOGY | Age: 60
End: 2020-05-27

## 2020-05-27 ENCOUNTER — VIRTUAL VISIT (OUTPATIENT)
Dept: GASTROENTEROLOGY | Age: 60
End: 2020-05-27
Payer: MEDICARE

## 2020-05-27 PROBLEM — F12.90 MARIJUANA USE: Status: ACTIVE | Noted: 2020-05-27

## 2020-05-27 PROCEDURE — 99214 OFFICE O/P EST MOD 30 MIN: CPT | Performed by: INTERNAL MEDICINE

## 2020-05-27 RX ORDER — ELUXADOLINE 100 MG/1
100 TABLET, FILM COATED ORAL 2 TIMES DAILY WITH MEALS
Qty: 60 TABLET | Refills: 3 | Status: SHIPPED | OUTPATIENT
Start: 2020-05-27 | End: 2020-05-27 | Stop reason: SDUPTHER

## 2020-05-27 RX ORDER — ELUXADOLINE 100 MG/1
100 TABLET, FILM COATED ORAL 2 TIMES DAILY WITH MEALS
Qty: 60 TABLET | Refills: 3 | Status: SHIPPED | OUTPATIENT
Start: 2020-05-27 | End: 2020-06-26

## 2020-05-27 RX ORDER — ELUXADOLINE 100 MG/1
100 TABLET, FILM COATED ORAL 2 TIMES DAILY WITH MEALS
Qty: 60 TABLET | Refills: 3 | Status: SHIPPED | OUTPATIENT
Start: 2020-05-27 | End: 2020-05-27

## 2020-05-27 RX ORDER — ONDANSETRON 4 MG/1
4 TABLET, FILM COATED ORAL EVERY 8 HOURS PRN
COMMUNITY
End: 2020-07-07

## 2020-05-28 ENCOUNTER — TELEPHONE (OUTPATIENT)
Dept: GASTROENTEROLOGY | Age: 60
End: 2020-05-28

## 2020-05-28 NOTE — TELEPHONE ENCOUNTER
Patient called office, took her Rx to pharmacy and also called her insurance. Even with a PA she will have to pay out of pocket for the Viberzi. She is going to pay for the 1st month to see if it will help, after that she will be filling out a patient assistance application. I explained to patient that she will need to go through all the steps first for her to be able to be eligible for patient assistance. Patient is in 100% agreement. She will take the Rx to pharmacy to get filled, we will get notification for PA. I told patient once we get that and initiate the PA we will notify her of that and also the approval. She will then keep us updated on how she is doing with medication and proceed from there. She does not want to proceed with filing out a patient assistance application until she sees that the medication will work.

## 2020-05-29 NOTE — TELEPHONE ENCOUNTER
PA was approved this morning. I notified the pharmacy and they stated copay was going to be $201. According to the patient, she is willing to pay the copay for the initial dose (to see if this works). If she sees medication is working, she will participate in patient assistance, I will sign her up. Copay wise, there is nothing I can do since the prior authorization was already approved.

## 2020-06-02 ENCOUNTER — OFFICE VISIT (OUTPATIENT)
Dept: PHYSICAL MEDICINE AND REHAB | Age: 60
End: 2020-06-02
Payer: MEDICARE

## 2020-06-02 VITALS
WEIGHT: 127 LBS | DIASTOLIC BLOOD PRESSURE: 62 MMHG | SYSTOLIC BLOOD PRESSURE: 100 MMHG | HEIGHT: 63 IN | BODY MASS INDEX: 22.5 KG/M2

## 2020-06-02 DIAGNOSIS — R19.7 DIARRHEA, UNSPECIFIED TYPE: ICD-10-CM

## 2020-06-02 DIAGNOSIS — K52.838 OTHER MICROSCOPIC COLITIS: ICD-10-CM

## 2020-06-02 PROBLEM — F43.29 GRIEF REACTION WITH PROLONGED BEREAVEMENT: Status: ACTIVE | Noted: 2020-06-02

## 2020-06-02 PROBLEM — F43.81 GRIEF REACTION WITH PROLONGED BEREAVEMENT: Status: ACTIVE | Noted: 2020-06-02

## 2020-06-02 PROBLEM — F12.90 MARIJUANA USE: Status: RESOLVED | Noted: 2020-05-27 | Resolved: 2020-06-02

## 2020-06-02 PROBLEM — T74.22XA CONFIRMED VICTIM OF SEXUAL ABUSE IN CHILDHOOD: Status: ACTIVE | Noted: 2020-06-02

## 2020-06-02 PROBLEM — T74.32XA VICTIM OF CHILDHOOD EMOTIONAL ABUSE: Status: ACTIVE | Noted: 2020-06-02

## 2020-06-02 LAB
ALBUMIN SERPL-MCNC: 4.5 G/DL (ref 3.5–4.6)
ALP BLD-CCNC: 92 U/L (ref 40–130)
ALT SERPL-CCNC: 17 U/L (ref 0–33)
ANION GAP SERPL CALCULATED.3IONS-SCNC: 14 MEQ/L (ref 9–15)
AST SERPL-CCNC: 16 U/L (ref 0–35)
BILIRUB SERPL-MCNC: 0.3 MG/DL (ref 0.2–0.7)
BUN BLDV-MCNC: 11 MG/DL (ref 6–20)
CALCIUM SERPL-MCNC: 10.1 MG/DL (ref 8.5–9.9)
CHLORIDE BLD-SCNC: 104 MEQ/L (ref 95–107)
CO2: 21 MEQ/L (ref 20–31)
CREAT SERPL-MCNC: 0.72 MG/DL (ref 0.5–0.9)
GFR AFRICAN AMERICAN: >60
GFR NON-AFRICAN AMERICAN: >60
GLOBULIN: 2.6 G/DL (ref 2.3–3.5)
GLUCOSE BLD-MCNC: 80 MG/DL (ref 70–99)
HCT VFR BLD CALC: 48.5 % (ref 37–47)
HEMOGLOBIN: 15.9 G/DL (ref 12–16)
MCH RBC QN AUTO: 30.7 PG (ref 27–31.3)
MCHC RBC AUTO-ENTMCNC: 32.9 % (ref 33–37)
MCV RBC AUTO: 93.4 FL (ref 82–100)
PDW BLD-RTO: 14.6 % (ref 11.5–14.5)
PLATELET # BLD: 308 K/UL (ref 130–400)
POTASSIUM SERPL-SCNC: 4 MEQ/L (ref 3.4–4.9)
RBC # BLD: 5.19 M/UL (ref 4.2–5.4)
SODIUM BLD-SCNC: 139 MEQ/L (ref 135–144)
TOTAL PROTEIN: 7.1 G/DL (ref 6.3–8)
WBC # BLD: 11.5 K/UL (ref 4.8–10.8)

## 2020-06-02 PROCEDURE — 99215 OFFICE O/P EST HI 40 MIN: CPT | Performed by: PHYSICAL MEDICINE & REHABILITATION

## 2020-06-02 RX ORDER — HYDROCODONE BITARTRATE AND ACETAMINOPHEN 7.5; 325 MG/1; MG/1
1 TABLET ORAL 2 TIMES DAILY
Qty: 60 TABLET | Refills: 0 | Status: SHIPPED | OUTPATIENT
Start: 2020-06-02 | End: 2020-07-01 | Stop reason: SDUPTHER

## 2020-06-02 ASSESSMENT — ENCOUNTER SYMPTOMS
WHEEZING: 0
VOMITING: 0
EYE PAIN: 0
PHOTOPHOBIA: 0
BLOOD IN STOOL: 0
SORE THROAT: 0
BOWEL INCONTINENCE: 0
STRIDOR: 0
SHORTNESS OF BREATH: 1
COUGH: 0
BACK PAIN: 1
ABDOMINAL PAIN: 1
CONSTIPATION: 1
NAUSEA: 0
EYE REDNESS: 0
DIARRHEA: 1

## 2020-06-02 NOTE — PROGRESS NOTES
neck support, NSAIDs and oral narcotics for the symptoms. The treatment provided mild relief. Arm Pain    The incident occurred more than 1 week ago. There was no injury mechanism. The pain is present in the right clavicle. The quality of the pain is described as cramping and aching. The pain is at a severity of 7/10. The pain is severe. The pain has been fluctuating since the incident. Associated symptoms include numbness and tingling. Pertinent negatives include no chest pain. The symptoms are aggravated by movement. She has tried NSAIDs, immobilization, elevation, acetaminophen and rest for the symptoms. The treatment provided moderate relief.        Past Medical History:   Diagnosis Date    Anxiety     Depression     Hyperlipidemia     Hypopotassemia     Lumbago     Osteopenia     PTSD (post-traumatic stress disorder)     Pulmonary embolism (HCC)     4 PE in bilat lungs    Spinal stenosis     congenital     Past Surgical History:   Procedure Laterality Date    HYSTERECTOMY      SPINE SURGERY      cervical x 4, fusions and reconstruction    TONSILLECTOMY      age 3     Social History     Socioeconomic History    Marital status:      Spouse name: Donna Ayoub Number of children: 1    Years of education:  15    Highest education level: Associate degree: occupational, technical, or vocational program   Occupational History    Occupation: On disability     Comment: Used to work as a  and did odd jobs   Social Needs    Financial resource strain: Not hard at all   [facility] insecurity     Worry: Never true     Inability: Never true    Transportation needs     Medical: No     Non-medical: No   Tobacco Use    Smoking status: Current Every Day Smoker     Packs/day: 0.50     Years: 40.00     Pack years: 20.00     Types: Cigarettes    Smokeless tobacco: Never Used   Substance and Sexual Activity    Alcohol use: No    Drug use: Yes     Types: Marijuana     Comment: using CBD drops     Sexual activity: None   Lifestyle    Physical activity     Days per week: 0 days     Minutes per session: 0 min    Stress: To some extent   Relationships    Social connections     Talks on phone: More than three times a week     Gets together: Once a week     Attends Hindu service: 1 to 4 times per year     Active member of club or organization: No     Attends meetings of clubs or organizations: Never     Relationship status:     Intimate partner violence     Fear of current or ex partner: No     Emotionally abused: No     Physically abused: No     Forced sexual activity: No   Other Topics Concern    None   Social History Narrative    [facility]      Had 1 son who is now  approximately 3 years ago (heroine addiction). She denies any addiction in herself or her . Family History   Problem Relation Age of Onset    Celiac Disease Neg Hx     Crohn's Disease Neg Hx        Allergies   Allergen Reactions    Latex Anaphylaxis     Rash    Fentanyl Rash     At site of application    Ketoprofen      Rash    Morphine Other (See Comments)     GI upset  Other reaction(s): GI Upset  GI upset    Tetracycline      Rash    Cefaclor Rash     Rash    Tetracyclines & Related Rash       Review of Systems   Constitutional: Positive for activity change, appetite change, fatigue and unexpected weight change. Negative for chills, diaphoresis, fever and weight loss. HENT: Negative for congestion, ear discharge, ear pain, hearing loss, nosebleeds, sore throat and tinnitus. Eyes: Negative for photophobia, pain and redness. Respiratory: Positive for shortness of breath. Negative for cough, wheezing and stridor. Shortness of breath on exertion   Cardiovascular: Negative for chest pain, palpitations, leg swelling and near-syncope. Gastrointestinal: Positive for abdominal pain, constipation and diarrhea.  Negative for blood in Effort: Pulmonary effort is normal. No tachypnea, bradypnea, accessory muscle usage or respiratory distress. Breath sounds: Decreased breath sounds present. No wheezing or rales. Chest:      Chest wall: No tenderness. Abdominal:      General: Bowel sounds are normal. There is no distension. Palpations: Abdomen is soft. There is no mass. Tenderness: There is no abdominal tenderness. There is no guarding or rebound. Musculoskeletal:         General: Tenderness present. Right shoulder: Normal.      Left shoulder: Normal.      Right elbow: Normal.     Left elbow: Normal.      Right wrist: Normal.      Left wrist: Normal.      Right hip: Normal.      Left hip: Normal.      Right knee: Normal.      Left knee: Normal.      Right ankle: Normal. Achilles tendon normal.      Left ankle: Normal. Achilles tendon normal.      Cervical back: Normal.      Thoracic back: Normal.      Lumbar back: She exhibits decreased range of motion, tenderness, bony tenderness and pain. She exhibits no swelling, no edema, no deformity, no laceration and normal pulse. Right upper arm: Normal.      Left upper arm: Normal.      Right forearm: Normal.      Left forearm: Normal.      Right hand: Normal.      Left hand: Normal.      Right upper leg: Normal.      Left upper leg: Normal.      Right lower leg: Normal.      Left lower leg: Normal.      Right foot: Normal.      Left foot: Normal.      Comments: Tender areas are indicated by numbered spot         Lymphadenopathy:      Cervical: No cervical adenopathy. Skin:     General: Skin is warm and dry. Coloration: Skin is not pale. Findings: No abrasion, bruising, ecchymosis, erythema, laceration, petechiae or rash. Rash is not macular, pustular or urticarial.      Nails: There is no clubbing. Neurological:      Mental Status: She is alert and oriented to person, place, and time. Cranial Nerves: No cranial nerve deficit.       Sensory: Sensory deficit plan with patient and answered the patient's questions. Adelso Woods MD  -[facility]--she is not pursuing this option--too big of a surgery. No new issues noted. No new issues noted. Electrodiagnostic:  Previous studies requested,     I discussed results with patient. See follow-up plans for new studies. Labs:  Previous labs reviewed     Lab Results   Component Value Date     02/10/2020    K 4.4 02/10/2020     02/10/2020    CO2 20 02/10/2020    BUN 8 02/10/2020    CREATININE 0.62 02/10/2020    CALCIUM 10.3 02/10/2020    LABALBU 4.7 02/10/2020    BILITOT 0.5 02/10/2020    ALKPHOS 87 02/10/2020    AST 26 02/10/2020    ALT 14 02/10/2020     Lab Results   Component Value Date    WBC 10.1 02/10/2020    RBC 5.32 02/10/2020    HGB 16.4 02/10/2020    HCT 48.3 02/10/2020    MCV 90.8 02/10/2020    MCH 30.9 02/10/2020    MCHC 34.0 02/10/2020    RDW 13.8 02/10/2020     02/10/2020    MPV 8.3 12/10/2012       Lab Results   Component Value Date    LABAMPH Neg 05/26/2020    BARBSCNU Neg 05/26/2020    LABBENZ Neg 05/26/2020    CANSU POSITIVE 05/26/2020    COCAIMETSCRU Neg 05/26/2020    PHENCYCLIDINESCREENURINE Neg 05/26/2020    DSCOMMENT see below 05/26/2020       No results found for: CODEINE, MORPHINE, ACETYLMORPHI, OXYCODONE, NOROXYCODONE, NOROXYMU, HYDRCO, NORHYDU, HYDROMO, BUPREN, NORBUPRNOR, FENTA, NORFENT, MEPERIDINE, TAPENU, TAPOSULFUR, METHADONE, LABPROP, TRAM, AMPH, METHAMP, MDMA, ECMDA    No results found for: METPHEN, PHENTERMINE, BENZOYL, ALPRAZ, ALPHAOHALPRA, CLONAZEPAM, 7AMINOCLONAZ, DIAZEP, JUSTINA, OXAZ, TEMAZ, LORAZEPAM, MIDAZOLAM, ZOLPIDEM, ISRA, ETG, MARIJMET, PCP, PAINMGTDRUGP, EERPAINMGTPA, LABCREA      , I discussed results with patient. See follow-up plans for new studies. Therapies:  HEP-gentle stretching and relaxation techniques-demonstrated with patient-they are to do them twice a day.   They are also advised to make the following lifestyle changes:  Goals      SOAPP-R      6-2-2020 score: 11 - moderate risk             Injections or Epidurals:  Injection options were discussed. Patient gave verbal consent to ordered injections. See follow-up plans for planned injections. Supplements:  Vitamin D with increased dosing during the rainy months,   Education was given on:   Dietary and Fitness--daily stretches and low carb diet-in chair Yoga when possible             Follow up with Primary Care Physician regarding their general medical needs. Stressed the importance of following up with PCP and specialists for his/her chronic diseases, health, CV, and cancer screening and continued care. Will follow disease activity/progression and adjust therapeutic regimen to disease activity and severity. Discussed medication dosage, usage, goals of therapy, and side effects. Available test results were reviewed -Discussed findings, impression and plan with patient. An additional 20 minutes were spent outside of the patient visit to review records. Additional time spent with the patient to discuss their questions. Additional time spent with the patient devoted to discussing treatment strategy, planning, and implementation. Patient understands above plan; questions asked and answered. Patient agrees to plan as noted above. F/U in 2-3months  At least 50% of the visit was involved in the discussion of the options for treatment. We discussed exercises, medication, interventional therapies and surgery. Healthy life style is essential with patient hard work to achieve the wellness. In addition; discussion with the patient and/or family about any of the diagnostic results, impressions and/or recommended diagnostic studies, prognosis, risks and benefits of treatment options, instructions for treatment and/or follow-up, importance of compliance with chosen treatment options, risk-factor reduction, and patient/family education.         They are to follow up in 2 months to review medication, efficacy of injections, pill counts, OARRS check, SOAPPR assessment, review diagnostics, to review previous and future treatment plans and assess appropriateness for continued therapy.         New Diagnostics  Orders Placed This Encounter   Procedures    VT INJECT TRIGGER POINTS, 3 OR GREATER    VT INJECTION AA&/STRD SUPRASCAPULAR NERVE       Nicolle Laura, DO

## 2020-06-03 ENCOUNTER — VIRTUAL VISIT (OUTPATIENT)
Dept: BEHAVIORAL/MENTAL HEALTH CLINIC | Age: 60
End: 2020-06-03
Payer: MEDICARE

## 2020-06-03 PROCEDURE — 90792 PSYCH DIAG EVAL W/MED SRVCS: CPT | Performed by: PSYCHIATRY & NEUROLOGY

## 2020-06-03 RX ORDER — MIRTAZAPINE 7.5 MG/1
7.5 TABLET, FILM COATED ORAL NIGHTLY
Qty: 30 TABLET | Refills: 2 | Status: SHIPPED | OUTPATIENT
Start: 2020-06-03 | End: 2020-08-03 | Stop reason: DRUGHIGH

## 2020-06-03 RX ORDER — DULOXETIN HYDROCHLORIDE 60 MG/1
60 CAPSULE, DELAYED RELEASE ORAL DAILY
Qty: 30 CAPSULE | Refills: 3 | Status: SHIPPED | OUTPATIENT
Start: 2020-06-03 | End: 2020-06-16

## 2020-06-03 RX ORDER — DULOXETIN HYDROCHLORIDE 30 MG/1
30 CAPSULE, DELAYED RELEASE ORAL DAILY
Qty: 30 CAPSULE | Refills: 3 | Status: SHIPPED | OUTPATIENT
Start: 2020-06-03 | End: 2020-06-16
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
## 2020-06-16 ENCOUNTER — VIRTUAL VISIT (OUTPATIENT)
Dept: BEHAVIORAL/MENTAL HEALTH CLINIC | Age: 60
End: 2020-06-16
Payer: MEDICARE

## 2020-06-16 PROCEDURE — 99214 OFFICE O/P EST MOD 30 MIN: CPT | Performed by: PSYCHIATRY & NEUROLOGY

## 2020-06-16 RX ORDER — BUPROPION HYDROCHLORIDE 150 MG/1
150 TABLET ORAL EVERY MORNING
Qty: 30 TABLET | Refills: 3 | Status: SHIPPED | OUTPATIENT
Start: 2020-06-16 | End: 2020-07-07

## 2020-06-16 RX ORDER — DULOXETIN HYDROCHLORIDE 30 MG/1
CAPSULE, DELAYED RELEASE ORAL
Qty: 42 CAPSULE | Refills: 0
Start: 2020-06-16 | End: 2020-07-02
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
## 2020-06-25 ENCOUNTER — VIRTUAL VISIT (OUTPATIENT)
Dept: GASTROENTEROLOGY | Age: 60
End: 2020-06-25
Payer: MEDICARE

## 2020-06-25 PROCEDURE — 99213 OFFICE O/P EST LOW 20 MIN: CPT | Performed by: INTERNAL MEDICINE

## 2020-06-25 NOTE — PROGRESS NOTES
Panchito Escalante MD   HYDROcodone-acetaminophen (NORCO) 7.5-325 MG per tablet Take 1 tablet by mouth 2 times daily for 30 days. Intended supply: 30 days Yes Nicolle Sanchez DO   ondansetron (ZOFRAN) 4 MG tablet Take 4 mg by mouth every 8 hours as needed for Nausea or Vomiting Yes Historical Provider, MD   loperamide (IMODIUM) 1 MG/5ML solution Take by mouth 4 times daily as needed for Diarrhea Yes Historical Provider, MD   Eluxadoline (VIBERZI) 100 MG TABS Take 100 mg by mouth 2 times daily (with meals) for 30 days. Yes Radha Shi MD   Wheat Dextrin (BENEFIBER) POWD Take 4 g by mouth 3 times daily (with meals) Yes Historical Provider, MD   lidocaine (XYLOCAINE) 5 % ointment Apply topically as needed. Yes Chaparrita Scott, DO   Elastic Bandages & Supports (CERVICAL COLLAR PEDIATRIC) MISC As tolerated and as needed for pain.  Yes Nicolle Sanchez DO   topiramate (TOPAMAX) 100 MG tablet Take 100 mg by mouth daily  Yes Historical Provider, MD   buPROPion (WELLBUTRIN XL) 150 MG extended release tablet Take 1 tablet by mouth every morning  Patient not taking: Reported on 6/25/2020  Panchito Escalante MD   mirtazapine (REMERON) 7.5 MG tablet Take 1 tablet by mouth nightly  Panchito Escalante MD   budesonide (ENTOCORT EC) 3 MG extended release capsule Tapering off  Historical Provider, MD   cholestyramine (QUESTRAN) 4 g packet Take 1 packet by mouth 2 times daily  Patient not taking: Reported on 6/25/2020  Tracey Rader DO     Social History     Tobacco Use    Smoking status: Current Every Day Smoker     Packs/day: 0.50     Years: 40.00     Pack years: 20.00     Types: Cigarettes    Smokeless tobacco: Never Used   Substance Use Topics    Alcohol use: No    Drug use: Yes     Types: Marijuana     Comment: using CBD drops       PSH, PMH, FH and allergies reviewed and updated    PHYSICAL EXAMINATION:  [ INSTRUCTIONS:  \"[x]\" Indicates a positive item  \"[]\" Indicates a negative item  -- DELETE ALL ITEMS NOT using speech recognition software and may cause contain errors related to thatsystem including grammar, punctuation and spelling as well as words and phrases that may seem inappropriate. If there are questions or concerns please feel free to contact me to clarify.

## 2020-06-28 NOTE — PROGRESS NOTES
Patient has been called as a reminder to have testing done.
APRN - CNP as PCP - [facility]    Wt Readings from Last 3 Encounters:   04/20/20 127 lb (57.6 kg)   02/27/20 129 lb (58.5 kg)   02/10/20 130 lb (59 kg)     Vitals:    04/20/20 1512   Weight: 127 lb (57.6 kg)   Height: 5' 3.5\" (1.613 m)     Body mass index is 22.14 kg/m². Based upon direct observation of the patient, evaluation of cognition reveals recent and remote memory intact. General Appearance: alert and oriented to person, place and time, well developed and well- nourished, in no acute distress  Skin: warm and dry, no rash or erythema  Head: normocephalic and atraumatic  Eyes: pupils equal, round, and reactive to light, extraocular eye movements intact, conjunctivae normal  ENT: tympanic membrane, external ear and ear canal normal bilaterally, nose without deformity, nasal mucosa and turbinates normal without polyps  Neck: supple and non-tender without mass, no thyromegaly or thyroid nodules, no cervical lymphadenopathy  Pulmonary/Chest: clear to auscultation bilaterally- no wheezes, rales or rhonchi, normal air movement, no respiratory distress  Cardiovascular: normal rate, regular rhythm, normal S1 and S2, no murmurs, rubs, clicks, or gallops, distal pulses intact, no carotid bruits  Abdomen: soft, non-tender, non-distended, normal bowel sounds, no masses or organomegaly  Extremities: no cyanosis, clubbing or edema  Musculoskeletal: normal range of motion, no joint swelling, deformity or tenderness  Neurologic: reflexes normal and symmetric, no cranial nerve deficit, gait, coordination and speech normal    Patient's complete Health Risk Assessment and screening values have been reviewed and are found in Flowsheets. The following problems were reviewed today and where indicated follow up appointments were made and/or referrals ordered.     Positive Risk Factor Screenings with Interventions:     Substance Abuse:  Social History     Tobacco History     Smoking Status  Current Every Day Smoker

## 2020-07-01 RX ORDER — HYDROCODONE BITARTRATE AND ACETAMINOPHEN 7.5; 325 MG/1; MG/1
1 TABLET ORAL 2 TIMES DAILY
Qty: 60 TABLET | Refills: 0 | Status: SHIPPED | OUTPATIENT
Start: 2020-07-01 | End: 2020-07-07

## 2020-07-02 ENCOUNTER — VIRTUAL VISIT (OUTPATIENT)
Dept: BEHAVIORAL/MENTAL HEALTH CLINIC | Age: 60
End: 2020-07-02
Payer: MEDICARE

## 2020-07-02 PROCEDURE — 99214 OFFICE O/P EST MOD 30 MIN: CPT | Performed by: PSYCHIATRY & NEUROLOGY
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
## 2020-07-07 ENCOUNTER — VIRTUAL VISIT (OUTPATIENT)
Dept: PHYSICAL MEDICINE AND REHAB | Age: 60
End: 2020-07-07
Payer: MEDICARE

## 2020-07-07 PROCEDURE — 99214 OFFICE O/P EST MOD 30 MIN: CPT | Performed by: PHYSICAL MEDICINE & REHABILITATION

## 2020-07-07 RX ORDER — HYDROCODONE BITARTRATE AND ACETAMINOPHEN 7.5; 325 MG/1; MG/1
1 TABLET ORAL EVERY 8 HOURS PRN
Qty: 90 TABLET | Refills: 0 | Status: SHIPPED | OUTPATIENT
Start: 2020-07-07 | End: 2020-07-28 | Stop reason: SDUPTHER

## 2020-07-07 ASSESSMENT — ENCOUNTER SYMPTOMS
EYE REDNESS: 0
SHORTNESS OF BREATH: 1
BLOOD IN STOOL: 0
BACK PAIN: 1
PHOTOPHOBIA: 0
STRIDOR: 0
COUGH: 0
SORE THROAT: 0
WHEEZING: 0
EYE PAIN: 0
VOMITING: 0
ABDOMINAL PAIN: 1
NAUSEA: 0
CONSTIPATION: 1
BOWEL INCONTINENCE: 0
DIARRHEA: 1

## 2020-07-07 NOTE — PROGRESS NOTES
TELEHEALTH EVALUATION -- Audio/Visual (During [facility]AU[facility]-96 public health emergency)    Due to COVID 19 outbreak, patient's office visit was converted to a virtual visit. Patient was contacted and agreed to proceed with a virtual visit via [employer]. me  The risks and benefits of converting to a virtual visit were discussed in light of the current infectious disease epidemic. Patient also understood that insurance coverage and co-pays are up to their individual insurance plans. Subjective       This patient has requested an audio/video evaluation for the following concern(s):    HPI:       We again had a long discussion about her use of medications she seems to be using it appropriately. She should however also try injections and we will try to get her scheduled for trigger points or suprascapular blocks as her colitis has settled down a bit. Is not able to take Cymbalta and NSAIDs dt Lymphocytic colitis. Will need an increase of Norco to 3 per day as the 2 per day is not controlling her pain. Neurologic Problem   The patient's primary symptoms include focal sensory loss, focal weakness and weakness. The patient's pertinent negatives include no altered mental status, clumsiness, loss of balance, memory loss, near-syncope or slurred speech. This is a chronic problem. The current episode started more than 1 year ago. The neurological problem developed insidiously. The problem has been waxing and waning since onset. There was right-sided and upper extremity focality noted. Associated symptoms include abdominal pain, back pain, fatigue, headaches, neck pain and shortness of breath. Pertinent negatives include no bladder incontinence, bowel incontinence, chest pain, diaphoresis, dizziness, fever, nausea, palpitations or vomiting. Past treatments include walking, acetaminophen, neck support and medication. The treatment provided moderate relief. Her past medical history is significant for mood changes.  There is no the right clavicle. The quality of the pain is described as cramping and aching. The pain is at a severity of 7/10. The pain is severe. The pain has been fluctuating since the incident. Associated symptoms include numbness and tingling. Pertinent negatives include no chest pain. The symptoms are aggravated by movement. She has tried NSAIDs, immobilization, elevation, acetaminophen and rest for the symptoms. The treatment provided moderate relief. Past Medical History:   Diagnosis Date    Anxiety     Depression     Hyperlipidemia     Hypopotassemia     Lumbago     Osteopenia     PTSD (post-traumatic stress disorder)     Pulmonary embolism (HCC)     4 PE in bilat lungs    Spinal stenosis     congenital       Past Surgical History:   Procedure Laterality Date    HYSTERECTOMY      SPINE SURGERY      cervical x 4, fusions and reconstruction    TONSILLECTOMY      age 3       Social History     Socioeconomic History    Marital status:      Spouse name: Narinder Sahu Number of children: 1    Years of education:  15    Highest education level: Associate degree: occupational, technical, or vocational program   Occupational History    Occupation: On disability     Comment: Used to work as a  and did odd jobs   Social Needs    Financial resource strain: Not hard at all   Bruna-Aly insecurity     Worry: Never true     Inability: Never true    Transportation needs     Medical: No     Non-medical: No   Tobacco Use    Smoking status: Current Every Day Smoker     Packs/day: 0.50     Years: 40.00     Pack years: 20.00     Types: Cigarettes    Smokeless tobacco: Never Used   Substance and Sexual Activity    Alcohol use: No    Drug use: Yes     Types: Marijuana     Comment: using CBD drops     Sexual activity: None   Lifestyle    Physical activity     Days per week: 0 days     Minutes per session: 0 min    Stress:  To some extent   Relationships    Social connections     Talks on phone: More than three times a week     Gets together: Once a week     Attends Anabaptism service: 1 to 4 times per year     Active member of club or organization: No     Attends meetings of clubs or organizations: Never     Relationship status:     Intimate partner violence     Fear of current or ex partner: No     Emotionally abused: No     Physically abused: No     Forced sexual activity: No   Other Topics Concern    None   Social History Narrative    Lives at Home with her . [facility]  [facility]      Had 1 son who is now  approximately 3 years ago (heroine addiction). She denies any addiction in herself or her . Family History   Problem Relation Age of Onset    Celiac Disease Neg Hx     Crohn's Disease Neg Hx        Allergies   Allergen Reactions    Latex Anaphylaxis     Rash    Fentanyl Rash     At site of application    Ketoprofen      Rash    Morphine Other (See Comments)     GI upset  Other reaction(s): GI Upset  GI upset    Nsaids     Tetracycline      Rash    Cefaclor Rash     Rash    Tetracyclines & Related Rash       Review of Systems   Constitutional: Positive for activity change, appetite change, fatigue and unexpected weight change. Negative for chills, diaphoresis, fever and weight loss. HENT: Negative for congestion, ear discharge, ear pain, hearing loss, nosebleeds, sore throat and tinnitus. Eyes: Negative for photophobia, pain and redness. Respiratory: Positive for shortness of breath. Negative for cough, wheezing and stridor. Shortness of breath on exertion   Cardiovascular: Negative for chest pain, palpitations, leg swelling and near-syncope. Gastrointestinal: Positive for abdominal pain, constipation and diarrhea. Negative for blood in stool, bowel incontinence, nausea and vomiting. Endocrine: Negative for polydipsia.    Genitourinary: Negative for bladder incontinence, dysuria, flank pain, frequency, hematuria, pelvic pain and urgency. Musculoskeletal: Positive for back pain, gait problem, myalgias and neck pain. Skin: Negative. Negative for rash. Allergic/Immunologic: Positive for immunocompromised state. Negative for environmental allergies. Neurological: Positive for tingling, focal weakness, weakness, numbness, headaches and paresthesias. Negative for dizziness, tremors, seizures and loss of balance. Hematological: Bruises/bleeds easily. Psychiatric/Behavioral: Negative. Negative for hallucinations, memory loss and suicidal ideas. The patient is not nervous/anxious. Objective    There were no vitals filed for this visit. No data recorded            PHYSICAL EXAMINATION:  [ INSTRUCTIONS:  \"[x]\" Indicates a positive item  \"[]\" Indicates a negative item  -- DELETE ALL ITEMS NOT EXAMINED]    [x] Alert  [x] Oriented to person/place/time      [x] No apparent distress  [x] Not toxic appearing    [x] Face complexion normal appearing [x] Sclera clear  [x] Lips are not cyanotic      [x] Breathing appears normal  [] Appears tachypneic      [] Rash on visible skin    [x] Cranial Nerves II-XII grossly intact    [x] Motor grossly intact in visible upper extremities, including stiff but intact range of motion in cervical, upper extremity and thoracic  [x] Motor grossly intact in visible lower extremities, including normal range of motion in the hips and knees for stiffness in the lumbar spine    [x] Normal Mood  [x] Not anxious appearing    [x] Not depressed appearing  [x] Not confused appearing      [x]  Good short term memory  [x]  Good long term memory    [x]  Able to follow 2-3 step commands    Due to this being a TeleHealth encounter, evaluation of the following organ systems is limited: Vitals/Constitutional/EENT/Resp/CV/GI//MS/Neuro/Skin/Heme-Lymph-Imm.     ASSESSMENT/PLAN:     After a thorough review and discussion of the previous medical records, patient comprehensive medical, surgical, and family and social history, Review of Systems, their OARRS, their Screener and Opioid Assessment for Patients with Pain (SOAPP®-R), recent diagnostics, and symptomatic results to previous treatment, it is my impression that the patients is suffering with progressive and severe:     Diagnosis Orders   1. Postlaminectomy syndrome, cervical region     2. Lumbosacral radiculopathy due to trauma  HYDROcodone-acetaminophen (NORCO) 7.5-325 MG per tablet   3. Chronic pain disorder  HYDROcodone-acetaminophen (NORCO) 7.5-325 MG per tablet   4. Cervical spondylosis with myelopathy  HYDROcodone-acetaminophen (NORCO) 7.5-325 MG per tablet   5. Brachial neuritis or radiculitis     6. Anxiety and depression  HYDROcodone-acetaminophen (NORCO) 7.5-325 MG per tablet   7. Myalgia  MO INJECT TRIGGER POINTS, 3 OR GREATER   8. Bilateral low back pain with sciatica, sciatica laterality unspecified, unspecified chronicity  HYDROcodone-acetaminophen (NORCO) 7.5-325 MG per tablet   9. High risk medication use  HYDROcodone-acetaminophen (NORCO) 7.5-325 MG per tablet       I am also concerned by lifestyle and mood issues including:    Past Medical History:   Diagnosis Date    Anxiety     Depression     Hyperlipidemia     Hypopotassemia     Lumbago     Osteopenia     PTSD (post-traumatic stress disorder)     Pulmonary embolism (HCC)     4 PE in bilat lungs    Spinal stenosis     congenital           Given their medication, chronic pain and lifestyle and medications they are at risk for :    Falls, constipation, addiction  Loss of livelyhood due to severe pain, debility, weight gain and  vitamin D deficiency    The patient was educated regarding proper diet, fitness routine, and regulatory restrictions concerning pain medications. Previous notes, comprehensive past medical, surgical, family history, and diagnostics were reviewed.    Patient education and councelling were provided regarding off label use,treatment options and medication and injection risks. Current and old OARRS ([place] Automated Prescription Reporting System) records reviewed, all refills reviewed since last visit,  Behavioral agreement/BALDEV regulations   and Toxicology screen was reviewed with patient and is up to date. There are no current red flags. They are making good progress regarding pain relief, they are performing at a functional level regarding activities of daily living, family interactions and psychological functioning, they're not having any adverse effects or side effects from the current medications, and I see no findings of aberrant drug taking or addiction related behaviors. The patient is aware that they have a chronic pain condition and they may require opiates dosing for life. All efforts will be made to wean to the lowest effective dose. Other therapies for pain have not been effective including nonopiate medications. Injections and exercises are only partially effective. A Rx for Narcan was offered to help prevent accidental overdose. RX Monitoring 7/7/2020   Acute Pain Prescriptions -   Periodic Controlled Substance Monitoring Possible medication side effects, risk of tolerance/dependence & alternative treatments discussed. ;No signs of potential drug abuse or diversion identified. ;Assessed functional status. ;Obtaining appropriate analgesic effect of treatment. Chronic Pain > 50 MEDD Re-evaluated the status of the patient's underlying condition causing pain. ;Considered consultation with a specialist.;Obtained or confirmed \"Consent for Opioid Use\" on file. Periodic Controlled Substance Monitoring: Possible medication side effects, risk of tolerance/dependence & alternative treatments discussed., No signs of potential drug abuse or diversion identified. , Assessed functional status., Obtaining appropriate analgesic effect of treatment. (Yossi Castañeda DO)       Patient is currently taking:       I have changed Ruth Velázquez.  José Miguel's HYDROcodone-acetaminophen. I am also having her maintain her topiramate, lidocaine, Benefiber, loperamide, and mirtazapine. I also recommend the following Medications:    Orders Placed This Encounter   Medications    HYDROcodone-acetaminophen (NORCO) 7.5-325 MG per tablet     Sig: Take 1 tablet by mouth every 8 hours as needed for Pain for up to 30 days. Intended supply: 30 days     Dispense:  90 tablet     Refill:  0     Reduce doses taken as pain becomes manageable        -which helps with pain and function. Otherwise, continue the current pain medications that I have prescibed. Radiologic:   Old films reviewed  ddd c spine,     I discussed results with patients. see Follow up plans below  For any new studies. Care Everywhere Updates:  requested and reviewed. No new issues noted. Electrodiagnostic:  Previous studies requested,     I discussed results with patient. See follow-up plans for new studies.         Labs:  Previous labs reviewed     Lab Results   Component Value Date     06/02/2020    K 4.0 06/02/2020     06/02/2020    CO2 21 06/02/2020    BUN 11 06/02/2020    CREATININE 0.72 06/02/2020    CALCIUM 10.1 06/02/2020    LABALBU 4.5 06/02/2020    BILITOT 0.3 06/02/2020    ALKPHOS 92 06/02/2020    AST 16 06/02/2020    ALT 17 06/02/2020     Lab Results   Component Value Date    WBC 11.5 06/02/2020    RBC 5.19 06/02/2020    HGB 15.9 06/02/2020    HCT 48.5 06/02/2020    MCV 93.4 06/02/2020    MCH 30.7 06/02/2020    MCHC 32.9 06/02/2020    RDW 14.6 06/02/2020     06/02/2020    MPV 8.3 12/10/2012       Lab Results   Component Value Date    LABAMPH Neg 05/26/2020    BARBSCNU Neg 05/26/2020    LABBENZ Neg 05/26/2020    CANSU POSITIVE 05/26/2020    COCAIMETSCRU Neg 05/26/2020    PHENCYCLIDINESCREENURINE Neg 05/26/2020    DSCOMMENT see below 05/26/2020       No results found for: CODEINE, MORPHINE, ACETYLMORPHI, OXYCODONE, NOROXYCODONE, NOROXYMU, HYDRCO, NORHYDU, HYDROMO, BUPREN, NORBUPRNOR, FENTA, NORFENT, MEPERIDINE, TAPENU, TAPOSULFUR, METHADONE, LABPROP, TRAM, AMPH, METHAMP, MDMA, ECMDA    No results found for: METPHEN, PHENTERMINE, BENZOYL, ALPRAZ, ALPHAOHALPRA, CLONAZEPAM, 7AMINOCLONAZ, DIAZEP, JUSTINA, OXAZ, TEMAZ, LORAZEPAM, MIDAZOLAM, ZOLPIDEM, ISRA, ETG, MARIJMET, PCP, PAINMGTDRUGP, EERPAINMGTPA, LABCREA      , I discussed results with patient. See follow-up plans for new studies. Therapies:  HEP-gentle stretching and relaxation techniques-demonstrated with patient-they are to do them twice a day. They are also advised to make the following lifestyle changes:  Goals      SOAPP-R      6-2-2020 score: 11 - moderate risk             Injections or Epidurals:  Injection options were discussed. Patient gave verbal consent to ordered injections. See follow-up plans for planned injections. Supplements:  Vitamin D with increased dosing during the rainy months,   Education was given on:   Dietary and Fitness--daily stretches and low carb diet-in chair Yoga when possible             Follow up with Primary Care Physician regarding their general medical needs. Stressed the importance of following up with PCP and specialists for his/her chronic diseases, health, CV, and cancer screening and continued care. Will follow disease activity/progression and adjust therapeutic regimen to disease activity and severity. Discussed medication dosage, usage, goals of therapy, and side effects. Available test results were reviewed -Discussed findings, impression and plan with patient. An additional 20 minutes were spent outside of the patient visit to review records. Additional time spent with the patient to discuss their questions. Additional time spent with the patient devoted to discussing treatment strategy, planning, and implementation. Greater than 50 minutes spent. Patient understands above plan; questions asked and answered.  Patient agrees to plan as noted above. F/U in 2-3months  At least 50% of the visit was involved in the discussion of the options for treatment. We discussed exercises, medication, interventional therapies and surgery. Healthy life style is essential with patient hard work to achieve the wellness. In addition; discussion with the patient and/or family about any of the diagnostic results, impressions and/or recommended diagnostic studies, prognosis, risks and benefits of treatment options, instructions for treatment and/or follow-up, importance of compliance with chosen treatment options, risk-factor reduction, and patient/family education. They are to follow up in 2 months to review medication, efficacy of injections, pill counts, OARRS check, SOAPPR assessment, review diagnostics, to review previous and future treatment plans and assess appropriateness for continued therapy. New Diagnostics  Orders Placed This Encounter   Procedures    OR INJECT TRIGGER POINTS, 3 OR GREATER       Follow-up in 2-1/2 to 3 months for guarding the efficacy of current plan and future treatment. An  electronic signature was used to authenticate this note. -Dr Kim Seaman, DO       With MA assistance from:   Joanna Bates MA     19}    Pursuant to the emergency declaration under the [facility] waiver authority and the [facility] [employer] Act, this Virtual  Visit was conducted, with patient's consent, to reduce the patient's risk of exposure to COVID-19 and provide continuity of care for an established patient. Services were provided through a video synchronous discussion virtually to substitute for in-person clinic visit.

## 2020-07-22 ENCOUNTER — VIRTUAL VISIT (OUTPATIENT)
Dept: BEHAVIORAL/MENTAL HEALTH CLINIC | Age: 60
End: 2020-07-22
Payer: MEDICARE

## 2020-07-22 PROCEDURE — 99214 OFFICE O/P EST MOD 30 MIN: CPT | Performed by: PSYCHIATRY & NEUROLOGY

## 2020-07-24 ENCOUNTER — TELEPHONE (OUTPATIENT)
Dept: FAMILY MEDICINE CLINIC | Age: 60
End: 2020-07-24

## 2020-07-29 RX ORDER — HYDROCODONE BITARTRATE AND ACETAMINOPHEN 7.5; 325 MG/1; MG/1
1 TABLET ORAL EVERY 8 HOURS PRN
Qty: 90 TABLET | Refills: 0 | Status: SHIPPED | OUTPATIENT
Start: 2020-07-29 | End: 2020-09-02 | Stop reason: SDUPTHER

## 2020-07-31 ENCOUNTER — VIRTUAL VISIT (OUTPATIENT)
Dept: BEHAVIORAL/MENTAL HEALTH CLINIC | Age: 60
End: 2020-07-31
Payer: MEDICARE

## 2020-07-31 PROCEDURE — 90832 PSYTX W PT 30 MINUTES: CPT | Performed by: PSYCHOLOGIST

## 2020-07-31 ASSESSMENT — PATIENT HEALTH QUESTIONNAIRE - PHQ9
SUM OF ALL RESPONSES TO PHQ QUESTIONS 1-9: 22
SUM OF ALL RESPONSES TO PHQ QUESTIONS 1-9: 22
1. LITTLE INTEREST OR PLEASURE IN DOING THINGS: 3
6. FEELING BAD ABOUT YOURSELF - OR THAT YOU ARE A FAILURE OR HAVE LET YOURSELF OR YOUR FAMILY DOWN: 3
9. THOUGHTS THAT YOU WOULD BE BETTER OFF DEAD, OR OF HURTING YOURSELF: 3
4. FEELING TIRED OR HAVING LITTLE ENERGY: 3
7. TROUBLE CONCENTRATING ON THINGS, SUCH AS READING THE NEWSPAPER OR WATCHING TELEVISION: 3
8. MOVING OR SPEAKING SO SLOWLY THAT OTHER PEOPLE COULD HAVE NOTICED. OR THE OPPOSITE, BEING SO FIGETY OR RESTLESS THAT YOU HAVE BEEN MOVING AROUND A LOT MORE THAN USUAL: 0
10. IF YOU CHECKED OFF ANY PROBLEMS, HOW DIFFICULT HAVE THESE PROBLEMS MADE IT FOR YOU TO DO YOUR WORK, TAKE CARE OF THINGS AT HOME, OR GET ALONG WITH OTHER PEOPLE: 3
5. POOR APPETITE OR OVEREATING: 3
2. FEELING DOWN, DEPRESSED OR HOPELESS: 3
SUM OF ALL RESPONSES TO PHQ9 QUESTIONS 1 & 2: 6
3. TROUBLE FALLING OR STAYING ASLEEP: 1

## 2020-07-31 NOTE — PROGRESS NOTES
Behavioral Health Consultation  Corinne Rom, Psy.D. Psychologist  7/31/20  3:04 PM EDT      Time spent with Patient: 25 minutes  This is patient's third  [facility] appointment. Reason for Consult:  depression  Referring Provider: RINA Thomas - CNP      Feedback given to PCP. TELEHEALTH EVALUATION -- Audio and/or Visual (During [facility]- public health emergency)    Due to COVID 19 outbreak, patient's office visit was converted to a virtual visit. Patient was contacted and agreed to proceed with a virtual visit via [employer]. Patient reports that they are located at home. Provider located at home office. The risks and benefits of converting to a virtual visit were discussed in light of the current infectious disease epidemic. Patient also understood that insurance coverage and co-pays are up to their individual insurance plans. Patient provides verbal consent for this visit to be billed to insurance. Pursuant to the emergency declaration under the [facility] waiver authority and the [employer] Act, this Virtual  Visit was conducted, with patient's consent, to reduce the patient's risk of exposure to COVID-19 and provide continuity of care for an established patient. Services were provided through an audio and video synchronous discussion virtually to substitute for in-person clinic visit. S:  Pt reports increased symptoms of depression. She notes that she has no interest in anything and has not been taking care of herself. She notes that this is the first day she has showered in the past 5 days. She feels unmotivated to take care of her home, but is eating meals with her  and is getting out of bed and dressed daily. She feels like she is having morbid ideation but has no intent to act on it. She reports increased anxiety and agitation since being on Wellbutrin.   Discussed spiral of depression, as this was given after her last scheduled appointment in May. Discussed behavioral activation and set plan to complete one activity daily including self-care. Communicated with psychiatry and pt is scheduled for follow-up next week. O:  MSE:    Appearance    alert, cooperative   Personal Hygiene : appropriately dressed, appropriately groomed and healthy looking  Appetite abnormal: fluctuates  Sleep disturbance Yes, including: non-restful sleep  Fatigue Yes  Loss of pleasure Yes  Impulsive behavior No  Speech    spontaneous, normal rate and normal volume  Mood   depressed   Affect    depressed affect  Thought Content    intact and helplessness  Thought Process    linear, goal directed and coherent  Associations    logical connections  Insight    fair  Judgment    fair  Orientation    oriented to person, place, time, and general circumstances  Memory    recent and remote memory intact  Attention/Concentration    impaired  Morbid ideation Yes  Suicide Assessment    no suicidal ideation      History:    Medications:   Current Outpatient Medications   Medication Sig Dispense Refill    HYDROcodone-acetaminophen (NORCO) 7.5-325 MG per tablet Take 1 tablet by mouth every 8 hours as needed for Pain for up to 30 days. Intended supply: 30 days 90 tablet 0    mirtazapine (REMERON) 7.5 MG tablet Take 1 tablet by mouth nightly 30 tablet 2    loperamide (IMODIUM) 1 MG/5ML solution Take by mouth 4 times daily as needed for Diarrhea      Wheat Dextrin (BENEFIBER) POWD Take 4 g by mouth 3 times daily (with meals)      lidocaine (XYLOCAINE) 5 % ointment Apply topically as needed. 1 Tube 11    topiramate (TOPAMAX) 100 MG tablet Take 100 mg by mouth daily        No current facility-administered medications for this visit.         Social History:   Social History     Socioeconomic History    Marital status:      Spouse name: Ney Sunshine Number of children: 1    Years of education:  14    Highest

## 2020-08-03 ENCOUNTER — VIRTUAL VISIT (OUTPATIENT)
Dept: BEHAVIORAL/MENTAL HEALTH CLINIC | Age: 60
End: 2020-08-03
Payer: MEDICARE

## 2020-08-03 PROCEDURE — 99214 OFFICE O/P EST MOD 30 MIN: CPT | Performed by: PSYCHIATRY & NEUROLOGY

## 2020-08-03 RX ORDER — PROPRANOLOL HYDROCHLORIDE 10 MG/1
10 TABLET ORAL 3 TIMES DAILY PRN
Qty: 42 TABLET | Refills: 0 | Status: SHIPPED | OUTPATIENT
Start: 2020-08-03 | End: 2020-11-24 | Stop reason: SDUPTHER

## 2020-08-03 RX ORDER — AMITRIPTYLINE HYDROCHLORIDE 25 MG/1
TABLET, FILM COATED ORAL
Qty: 32 TABLET | Refills: 0 | Status: SHIPPED | OUTPATIENT
Start: 2020-08-03 | End: 2020-08-17 | Stop reason: CLARIF
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
## 2020-08-05 DIAGNOSIS — F33.0 MILD EPISODE OF RECURRENT MAJOR DEPRESSIVE DISORDER (HCC): ICD-10-CM

## 2020-08-05 DIAGNOSIS — E55.9 VITAMIN D DEFICIENCY, UNSPECIFIED: ICD-10-CM

## 2020-08-05 DIAGNOSIS — Z13.220 LIPID SCREENING: ICD-10-CM

## 2020-08-05 LAB
ALBUMIN SERPL-MCNC: 4.6 G/DL (ref 3.5–4.6)
ALP BLD-CCNC: 95 U/L (ref 40–130)
ALT SERPL-CCNC: 9 U/L (ref 0–33)
ANION GAP SERPL CALCULATED.3IONS-SCNC: 11 MEQ/L (ref 9–15)
AST SERPL-CCNC: 18 U/L (ref 0–35)
BASOPHILS ABSOLUTE: 0.1 K/UL (ref 0–0.2)
BASOPHILS RELATIVE PERCENT: 0.9 %
BILIRUB SERPL-MCNC: 0.4 MG/DL (ref 0.2–0.7)
BILIRUBIN DIRECT: <0.2 MG/DL (ref 0–0.4)
BILIRUBIN, INDIRECT: NORMAL MG/DL (ref 0–0.6)
BUN BLDV-MCNC: 12 MG/DL (ref 8–23)
CALCIUM SERPL-MCNC: 9.9 MG/DL (ref 8.5–9.9)
CHLORIDE BLD-SCNC: 101 MEQ/L (ref 95–107)
CHOLESTEROL, TOTAL: 315 MG/DL (ref 0–199)
CO2: 25 MEQ/L (ref 20–31)
CREAT SERPL-MCNC: 0.79 MG/DL (ref 0.5–0.9)
EOSINOPHILS ABSOLUTE: 0.2 K/UL (ref 0–0.7)
EOSINOPHILS RELATIVE PERCENT: 1.6 %
FOLATE: 4.2 NG/ML (ref 7.3–26.1)
GFR AFRICAN AMERICAN: >60
GFR NON-AFRICAN AMERICAN: >60
GLOBULIN: 2.9 G/DL (ref 2.3–3.5)
GLUCOSE BLD-MCNC: 98 MG/DL (ref 70–99)
HCT VFR BLD CALC: 50.3 % (ref 37–47)
HDLC SERPL-MCNC: 50 MG/DL (ref 40–59)
HEMOGLOBIN: 16.5 G/DL (ref 12–16)
LDL CHOLESTEROL CALCULATED: 229 MG/DL (ref 0–129)
LYMPHOCYTES ABSOLUTE: 3.2 K/UL (ref 1–4.8)
LYMPHOCYTES RELATIVE PERCENT: 23.9 %
MCH RBC QN AUTO: 30.9 PG (ref 27–31.3)
MCHC RBC AUTO-ENTMCNC: 32.7 % (ref 33–37)
MCV RBC AUTO: 94.3 FL (ref 82–100)
MONOCYTES ABSOLUTE: 0.6 K/UL (ref 0.2–0.8)
MONOCYTES RELATIVE PERCENT: 4.6 %
NEUTROPHILS ABSOLUTE: 9.3 K/UL (ref 1.4–6.5)
NEUTROPHILS RELATIVE PERCENT: 69 %
PDW BLD-RTO: 13.6 % (ref 11.5–14.5)
PLATELET # BLD: 323 K/UL (ref 130–400)
POTASSIUM SERPL-SCNC: 4.2 MEQ/L (ref 3.4–4.9)
RBC # BLD: 5.34 M/UL (ref 4.2–5.4)
SODIUM BLD-SCNC: 137 MEQ/L (ref 135–144)
TOTAL PROTEIN: 7.5 G/DL (ref 6.3–8)
TRIGL SERPL-MCNC: 181 MG/DL (ref 0–150)
TSH REFLEX: 1.36 UIU/ML (ref 0.44–3.86)
VITAMIN B-12: 648 PG/ML (ref 232–1245)
VITAMIN D 25-HYDROXY: 32.2 NG/ML (ref 30–100)
WBC # BLD: 13.5 K/UL (ref 4.8–10.8)

## 2020-08-10 ENCOUNTER — VIRTUAL VISIT (OUTPATIENT)
Dept: BEHAVIORAL/MENTAL HEALTH CLINIC | Age: 60
End: 2020-08-10
Payer: MEDICARE

## 2020-08-10 PROBLEM — F43.10 PTSD (POST-TRAUMATIC STRESS DISORDER): Status: ACTIVE | Noted: 2020-08-10

## 2020-08-10 PROBLEM — F33.1 MAJOR DEPRESSIVE DISORDER, RECURRENT EPISODE, MODERATE (HCC): Status: ACTIVE | Noted: 2020-08-10

## 2020-08-10 PROCEDURE — 90832 PSYTX W PT 30 MINUTES: CPT | Performed by: PSYCHOLOGIST

## 2020-08-10 PROCEDURE — 99214 OFFICE O/P EST MOD 30 MIN: CPT | Performed by: PSYCHIATRY & NEUROLOGY

## 2020-08-10 RX ORDER — AMITRIPTYLINE HYDROCHLORIDE 100 MG/1
100 TABLET, FILM COATED ORAL NIGHTLY
Qty: 30 TABLET | Refills: 3 | Status: SHIPPED | OUTPATIENT
Start: 2020-08-10 | End: 2021-01-26 | Stop reason: SDUPTHER

## 2020-08-10 ASSESSMENT — PATIENT HEALTH QUESTIONNAIRE - PHQ9
SUM OF ALL RESPONSES TO PHQ QUESTIONS 1-9: 5
10. IF YOU CHECKED OFF ANY PROBLEMS, HOW DIFFICULT HAVE THESE PROBLEMS MADE IT FOR YOU TO DO YOUR WORK, TAKE CARE OF THINGS AT HOME, OR GET ALONG WITH OTHER PEOPLE: 1
4. FEELING TIRED OR HAVING LITTLE ENERGY: 0
SUM OF ALL RESPONSES TO PHQ9 QUESTIONS 1 & 2: 3
9. THOUGHTS THAT YOU WOULD BE BETTER OFF DEAD, OR OF HURTING YOURSELF: 0
3. TROUBLE FALLING OR STAYING ASLEEP: 0
5. POOR APPETITE OR OVEREATING: 0
2. FEELING DOWN, DEPRESSED OR HOPELESS: 1
8. MOVING OR SPEAKING SO SLOWLY THAT OTHER PEOPLE COULD HAVE NOTICED. OR THE OPPOSITE, BEING SO FIGETY OR RESTLESS THAT YOU HAVE BEEN MOVING AROUND A LOT MORE THAN USUAL: 0
SUM OF ALL RESPONSES TO PHQ QUESTIONS 1-9: 5
6. FEELING BAD ABOUT YOURSELF - OR THAT YOU ARE A FAILURE OR HAVE LET YOURSELF OR YOUR FAMILY DOWN: 1
1. LITTLE INTEREST OR PLEASURE IN DOING THINGS: 2
7. TROUBLE CONCENTRATING ON THINGS, SUCH AS READING THE NEWSPAPER OR WATCHING TELEVISION: 1
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
## 2020-08-13 ENCOUNTER — VIRTUAL VISIT (OUTPATIENT)
Dept: BEHAVIORAL/MENTAL HEALTH CLINIC | Age: 60
End: 2020-08-13
Payer: MEDICARE

## 2020-08-13 PROCEDURE — 99214 OFFICE O/P EST MOD 30 MIN: CPT | Performed by: PSYCHIATRY & NEUROLOGY
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
## 2020-08-17 ENCOUNTER — VIRTUAL VISIT (OUTPATIENT)
Dept: FAMILY MEDICINE CLINIC | Age: 60
End: 2020-08-17
Payer: MEDICARE

## 2020-08-17 PROBLEM — F11.29 OPIOID DEPENDENCE WITH OPIOID-INDUCED DISORDER (HCC): Status: ACTIVE | Noted: 2020-08-17

## 2020-08-17 PROCEDURE — 99214 OFFICE O/P EST MOD 30 MIN: CPT | Performed by: NURSE PRACTITIONER

## 2020-08-17 RX ORDER — FOLIC ACID 1 MG/1
1 TABLET ORAL DAILY
Qty: 30 TABLET | Refills: 5 | Status: SHIPPED | OUTPATIENT
Start: 2020-08-17 | End: 2020-11-24 | Stop reason: SDUPTHER

## 2020-08-17 ASSESSMENT — ENCOUNTER SYMPTOMS
COUGH: 0
SHORTNESS OF BREATH: 0

## 2020-08-17 NOTE — PROGRESS NOTES
2020    TELEHEALTH EVALUATION -- Audio/Visual (During OSQBE-53 public health emergency)    Due to [place] 19 outbreak, patient's office visit was converted to a virtual visit. Patient was contacted and agreed to proceed with a virtual visit via [email or web address]  The risks and benefits of converting to a virtual visit were discussed in light of the current infectious disease epidemic. Patient also understood that insurance coverage and co-pays are up to their individual insurance plans. HPI:    Lavelle Le (:  1960) has requested an audio/video evaluation for the following concern(s):    Seeing Dr. Dada Herndon for psychiatry. Recent labs:    Lab Results   Component Value Date    WBC 13.5 (H) 2020    HGB 16.5 (H) 2020    HCT 50.3 (H) 2020     2020    CHOL 315 (H) 2020    TRIG 181 (H) 2020    HDL 50 2020    ALT 9 2020    AST 18 2020     2020    K 4.2 2020     2020    CREATININE 0.79 2020    BUN 12 2020    CO2 25 2020    TSH 1.470 10/15/2018    INR 1.0 2018    LABA1C 5.6 10/01/2019     On elavil. Doing slightly better. Lymphocytic colitis doing much better. Would like to discuss hyperlipidemia. Has appt with GI coming up to discuss. WBC count persistently elevated    Review of Systems   Respiratory: Negative for cough and shortness of breath. Cardiovascular: Negative for chest pain. Prior to Visit Medications    Medication Sig Taking?  Authorizing Provider   NONFORMULARY daily as needed Lidocaine, ibuprofen, amitriptyline cream. Yes Historical Provider, MD   folic acid (FOLVITE) 1 MG tablet Take 1 tablet by mouth daily Yes RINA Velez - CNP   amitriptyline (ELAVIL) 100 MG tablet Take 1 tablet by mouth nightly Yes Karyn Stapleton MD   propranolol (INDERAL) 10 MG tablet Take 1 tablet by mouth 3 times daily as needed (for anxiety and irritability) Yes Vicky Covert Kiesha Vinson MD   HYDROcodone-acetaminophen (NORCO) 7.5-325 MG per tablet Take 1 tablet by mouth every 8 hours as needed for Pain for up to 30 days.  Intended supply: 30 days Yes Nicolle Sanchez DO   loperamide (IMODIUM) 1 MG/5ML solution Take by mouth 4 times daily as needed for Diarrhea Yes Historical Provider, MD   Wheat Dextrin (BENEFIBER) POWD Take 4 g by mouth 3 times daily (with meals) Yes Historical Provider, MD   topiramate (TOPAMAX) 100 MG tablet Take 100 mg by mouth daily  Yes Historical Provider, MD       Social History     Tobacco Use    Smoking status: Current Every Day Smoker     Packs/day: 0.50     Years: 40.00     Pack years: 20.00     Types: Cigarettes    Smokeless tobacco: Never Used   Substance Use Topics    Alcohol use: No    Drug use: Yes     Types: Marijuana     Comment: using CBD drops         Allergies   Allergen Reactions    Latex Anaphylaxis     Rash    Fentanyl Rash     At site of application    Ketoprofen      Rash    Morphine Other (See Comments)     GI upset  Other reaction(s): GI Upset  GI upset    Nsaids     Tetracycline      Rash    Cefaclor Rash     Rash    Cymbalta [Duloxetine Hcl] Nausea And Vomiting    Tetracyclines & Related Rash   ,   Past Medical History:   Diagnosis Date    Anxiety     Depression     Hyperlipidemia     Hypopotassemia     Lumbago     Osteopenia     PTSD (post-traumatic stress disorder)     Pulmonary embolism (HCC)     4 PE in bilat lungs    Spinal stenosis     congenital   ,   Past Surgical History:   Procedure Laterality Date    HYSTERECTOMY      SPINE SURGERY      cervical x 4, fusions and reconstruction    TONSILLECTOMY      age 3   ,   Social History     Tobacco Use    Smoking status: Current Every Day Smoker     Packs/day: 0.50     Years: 40.00     Pack years: 20.00     Types: Cigarettes    Smokeless tobacco: Never Used   Substance Use Topics    Alcohol use: No    Drug use: Yes     Types: Marijuana     Comment: using CBD drops ,   Family History   Problem Relation Age of Onset    Celiac Disease Neg Hx     Crohn's Disease Neg Hx        PHYSICAL EXAMINATION:  [ INSTRUCTIONS:  \"[x]\" Indicates a positive item  \"[]\" Indicates a negative item  -- DELETE ALL ITEMS NOT EXAMINED]  [x] Alert  [x] Oriented to person/place/time    [x] No apparent distress  [] Toxic appearing    [] Face flushed appearing [x] Sclera clear  [] Lips are cyanotic      [x] Breathing appears normal  [] Appears tachypneic      [] Rash on visible skin    [x] Cranial Nerves II-XII grossly intact    [x] Motor grossly intact in visible upper extremities    [x] Motor grossly intact in visible lower extremities    [x] Normal Mood  [] Anxious appearing    [] Depressed appearing  [] Confused appearing      [] Poor short term memory  [] Poor long term memory    [] OTHER:      Due to this being a TeleHealth encounter, evaluation of the following organ systems is limited: Vitals/Constitutional/EENT/Resp/CV/GI//MS/Neuro/Skin/Heme-Lymph-Imm. ASSESSMENT/PLAN:  1. Folic acid deficiency    - folic acid (FOLVITE) 1 MG tablet; Take 1 tablet by mouth daily  Dispense: 30 tablet; Refill: 5    2. Opioid dependence with opioid-induced disorder ([facility]  - seeing Dr. Leeanna Kilpatrick regularly. 3. Leukocytosis, unspecified type    - CBC Auto Differential; Future    4. Mild episode of recurrent major depressive disorder ([facility]  - continue with Dr. Twila Forrest and dafne    5. Other screening mammogram    - GONZALEZ DIGITAL SCREEN W OR WO CAD BILATERAL; Future    6. Hyperlipidemia, unspecified hyperlipidemia type  - will discuss potential statin with GI. Nervous about side effects related to lymphocytic colitis. Side effects, adverse effects of the medication prescribed today, as well as treatment plan/ rationale and result expectations have been discussed with the patient who expresses understanding and desires to proceed. Close follow up to evaluate treatment results and for coordination of care.   I have reviewed the patient's medical history in detail and updated the computerized patient record. As always, patient is advised that if symptoms worsen in any way they will proceed to the nearest emergency room. Return in about 3 months (around 11/17/2020). An  electronic signature was used to authenticate this note. --Daryle Gaucher, APRN - CNP on 8/17/2020 at 1:13 PM        Pursuant to the emergency declaration under the [facility], [facility] waiver authority and the [employer] and [employer] Act, this Virtual  Visit was conducted, with patient's consent, to reduce the patient's risk of exposure to COVID-19 and provide continuity of care for an established patient. Services were provided through a video synchronous discussion virtually to substitute for in-person clinic visit.

## 2020-08-28 ENCOUNTER — VIRTUAL VISIT (OUTPATIENT)
Dept: BEHAVIORAL/MENTAL HEALTH CLINIC | Age: 60
End: 2020-08-28
Payer: MEDICARE

## 2020-08-28 PROCEDURE — 98968 PH1 ASSMT&MGMT NQHP 21-30: CPT | Performed by: PSYCHOLOGIST

## 2020-08-28 ASSESSMENT — PATIENT HEALTH QUESTIONNAIRE - PHQ9
10. IF YOU CHECKED OFF ANY PROBLEMS, HOW DIFFICULT HAVE THESE PROBLEMS MADE IT FOR YOU TO DO YOUR WORK, TAKE CARE OF THINGS AT HOME, OR GET ALONG WITH OTHER PEOPLE: 1
8. MOVING OR SPEAKING SO SLOWLY THAT OTHER PEOPLE COULD HAVE NOTICED. OR THE OPPOSITE, BEING SO FIGETY OR RESTLESS THAT YOU HAVE BEEN MOVING AROUND A LOT MORE THAN USUAL: 0
6. FEELING BAD ABOUT YOURSELF - OR THAT YOU ARE A FAILURE OR HAVE LET YOURSELF OR YOUR FAMILY DOWN: 1
4. FEELING TIRED OR HAVING LITTLE ENERGY: 1
5. POOR APPETITE OR OVEREATING: 0
SUM OF ALL RESPONSES TO PHQ QUESTIONS 1-9: 5
1. LITTLE INTEREST OR PLEASURE IN DOING THINGS: 1
9. THOUGHTS THAT YOU WOULD BE BETTER OFF DEAD, OR OF HURTING YOURSELF: 0
7. TROUBLE CONCENTRATING ON THINGS, SUCH AS READING THE NEWSPAPER OR WATCHING TELEVISION: 1
SUM OF ALL RESPONSES TO PHQ QUESTIONS 1-9: 5
SUM OF ALL RESPONSES TO PHQ9 QUESTIONS 1 & 2: 2
3. TROUBLE FALLING OR STAYING ASLEEP: 0
2. FEELING DOWN, DEPRESSED OR HOPELESS: 1

## 2020-08-28 NOTE — PROGRESS NOTES
mom was very worrisome for the pt because she was fearing that her mom might not improve. 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 She reports that overall she is feeling better. She denies SI and HI.    O:  MSE:    Appearance    alert, cooperative   Appetite abnormal: fluctuates  Sleep disturbance No  Fatigue No  Loss of pleasure Yes  Impulsive behavior No  Speech    spontaneous, normal rate and normal volume  Mood   neutral   Affect    neutral-euthymic affect  Thought Content    intact and helplessness  Thought Process    linear, goal directed and coherent  Associations    logical connections  Insight    fair  Judgment    fair  Orientation    oriented to person, place, time, and general circumstances  Memory    recent and remote memory intact  Attention/Concentration    impaired  Morbid ideation no  Suicide Assessment    no suicidal ideation         History:    Medications:   Current Outpatient Medications   Medication Sig Dispense Refill    NONFORMULARY daily as needed Lidocaine, ibuprofen, amitriptyline cream.      folic acid (FOLVITE) 1 MG tablet Take 1 tablet by mouth daily 30 tablet 5    amitriptyline (ELAVIL) 100 MG tablet Take 1 tablet by mouth nightly 30 tablet 3    propranolol (INDERAL) 10 MG tablet Take 1 tablet by mouth 3 times daily as needed (for anxiety and irritability) 42 tablet 0    HYDROcodone-acetaminophen (NORCO) 7.5-325 MG per tablet Take 1 tablet by mouth every 8 hours as needed for Pain for up to 30 days. Intended supply: 30 days 90 tablet 0    loperamide (IMODIUM) 1 MG/5ML solution Take by mouth 4 times daily as needed for Diarrhea      Wheat Dextrin (BENEFIBER) POWD Take 4 g by mouth 3 times daily (with meals)      topiramate (TOPAMAX) 100 MG tablet Take 100 mg by mouth daily        No current facility-administered medications for this visit.         Social History:   Social History     Socioeconomic History  Marital status:      Spouse name: Santa Bobby Number of children: 1    Years of education:  15    Highest education level: Associate degree: occupational, technical, or vocational program   Occupational History    Occupation: On disability     Comment: Used to work as a  and did odd jobs   Social Needs    Financial resource strain: Not hard at all   Bruna-Aly insecurity     Worry: Never true     Inability: Never true    Transportation needs     Medical: No     Non-medical: No   Tobacco Use    Smoking status: Current Every Day Smoker     Packs/day: 0.50     Years: 40.00     Pack years: 20.00     Types: Cigarettes    Smokeless tobacco: Never Used   Substance and Sexual Activity    Alcohol use: No    Drug use: Yes     Types: Marijuana     Comment: using CBD drops     Sexual activity: Not on file   Lifestyle    Physical activity     Days per week: 0 days     Minutes per session: 0 min    Stress: To some extent   Relationships    Social connections     Talks on phone: More than three times a week     Gets together: Once a week     Attends Nondenominational service: 1 to 4 times per year     Active member of club or organization: No     Attends meetings of clubs or organizations: Never     Relationship status:     Intimate partner violence     Fear of current or ex partner: No     Emotionally abused: No     Physically abused: No     Forced sexual activity: No   Other Topics Concern    Not on file   Social History Narrative    Lives at Home with her . [facility] in  [address]     Had 1 son who is now  approximately 3 years ago (heroine addiction). She denies any addiction in herself or her . TOBACCO:   reports that she has been smoking cigarettes. She has a 20.00 pack-year smoking history. She has never used smokeless tobacco.  ETOH:   reports no history of alcohol use.     Family History:   Family History   Problem Relation Age of Onset    Celiac Disease Neg Hx     Crohn's Disease Neg Hx          A:  Administered the PHQ9 which indicates a self report of mild symptom distress. Pt would benefit from [facility] services to increase coping skills to provide symptom management/control/relief. PHQ Scores 8/28/2020 8/10/2020 7/31/2020 5/20/2020 5/6/2020 4/15/2020 8/9/2018   PHQ2 Score 2 3 6 4 6 2 1   PHQ9 Score 5 5 22 10 16 2 1     Interpretation of Total Score Depression Severity: 1-4 = Minimal depression, 5-9 = Mild depression, 10-14 = Moderate depression, 15-19 = Moderately severe depression, 20-27 = Severe depression      Diagnosis:    Major depressive disorder; recurrent    Plan:  Pt interventions: Motivational Interviewing to increase patient confidence and compliance with adhering to behavioral change plan, Discussed potential barriers to change, [place]-setting to identify pt's primary goals for [facility] visit / overall health, Supportive techniques and Emphasized self-care as important for managing overall health      Pt Behavioral Change Plan:  Follow up in 2 weeks      Please note this report has been partially produced using speech recognition software  And may cause contain errors related to that system including grammar, punctuation and spelling as well as words and phrases that may seem inappropriate. If there are questions or concerns please feel free to contact me to clarify.

## 2020-09-01 RX ORDER — TOPIRAMATE 100 MG/1
100 TABLET, FILM COATED ORAL 2 TIMES DAILY
Qty: 60 TABLET | Refills: 0 | Status: CANCELLED | OUTPATIENT
Start: 2020-09-01

## 2020-09-02 ENCOUNTER — VIRTUAL VISIT (OUTPATIENT)
Dept: BEHAVIORAL/MENTAL HEALTH CLINIC | Age: 60
End: 2020-09-02
Payer: MEDICARE

## 2020-09-02 PROCEDURE — 99214 OFFICE O/P EST MOD 30 MIN: CPT | Performed by: PSYCHIATRY & NEUROLOGY

## 2020-09-02 RX ORDER — HYDROCODONE BITARTRATE AND ACETAMINOPHEN 7.5; 325 MG/1; MG/1
1 TABLET ORAL EVERY 8 HOURS PRN
Qty: 90 TABLET | Refills: 0 | Status: SHIPPED | OUTPATIENT
Start: 2020-09-02 | End: 2020-10-09 | Stop reason: SDUPTHER

## 2020-09-02 RX ORDER — TOPIRAMATE 100 MG/1
100 TABLET, FILM COATED ORAL 2 TIMES DAILY
Qty: 180 TABLET | Refills: 0 | Status: SHIPPED | OUTPATIENT
Start: 2020-09-02 | End: 2020-12-08 | Stop reason: SDUPTHER
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
## 2020-09-11 ENCOUNTER — VIRTUAL VISIT (OUTPATIENT)
Dept: BEHAVIORAL/MENTAL HEALTH CLINIC | Age: 60
End: 2020-09-11
Payer: MEDICARE

## 2020-09-11 PROCEDURE — 98967 PH1 ASSMT&MGMT NQHP 11-20: CPT | Performed by: PSYCHOLOGIST

## 2020-09-11 ASSESSMENT — PATIENT HEALTH QUESTIONNAIRE - PHQ9
SUM OF ALL RESPONSES TO PHQ QUESTIONS 1-9: 3
3. TROUBLE FALLING OR STAYING ASLEEP: 0
2. FEELING DOWN, DEPRESSED OR HOPELESS: 1
4. FEELING TIRED OR HAVING LITTLE ENERGY: 0
1. LITTLE INTEREST OR PLEASURE IN DOING THINGS: 1
10. IF YOU CHECKED OFF ANY PROBLEMS, HOW DIFFICULT HAVE THESE PROBLEMS MADE IT FOR YOU TO DO YOUR WORK, TAKE CARE OF THINGS AT HOME, OR GET ALONG WITH OTHER PEOPLE: 1
7. TROUBLE CONCENTRATING ON THINGS, SUCH AS READING THE NEWSPAPER OR WATCHING TELEVISION: 0
SUM OF ALL RESPONSES TO PHQ QUESTIONS 1-9: 3
SUM OF ALL RESPONSES TO PHQ9 QUESTIONS 1 & 2: 2
8. MOVING OR SPEAKING SO SLOWLY THAT OTHER PEOPLE COULD HAVE NOTICED. OR THE OPPOSITE, BEING SO FIGETY OR RESTLESS THAT YOU HAVE BEEN MOVING AROUND A LOT MORE THAN USUAL: 0
6. FEELING BAD ABOUT YOURSELF - OR THAT YOU ARE A FAILURE OR HAVE LET YOURSELF OR YOUR FAMILY DOWN: 1
9. THOUGHTS THAT YOU WOULD BE BETTER OFF DEAD, OR OF HURTING YOURSELF: 0
5. POOR APPETITE OR OVEREATING: 0

## 2020-09-11 NOTE — PROGRESS NOTES
Behavioral Health Consultation  Deirdre Padron Psy.D. Psychologist  9/11/20  2:06 PM EDT      Time spent with Patient: 17 minutes  This is patient's sixth  [facility] appointment. Reason for Consult:  depression  Referring Provider: RINA Hoskins - CNP      Feedback given to PCP. TELEHEALTH EVALUATION -- Audio and/or Visual (During IQFVQ-06 public health emergency)    Due to COVID 19 outbreak, patient's office visit was converted to a virtual visit. Patient was contacted and agreed to proceed with a virtual visit via Telephone Visit. Patient reports that they are located at home. Provider located at home office. The risks and benefits of converting to a virtual visit were discussed in light of the current infectious disease epidemic. Patient also understood that insurance coverage and co-pays are up to their individual insurance plans. Patient provides verbal consent for this visit to be billed to insurance. Pursuant to the emergency declaration under the [facility] waiver authority and the [place] and [employer] Act, this Virtual  Visit was conducted, with patient's consent, to reduce the patient's risk of exposure to COVID-19 and provide continuity of care for an established patient. Services were provided through a telephonic synchronous discussion virtually to substitute for in-person clinic visit. Telephone call due to video difficulties. S:  Pt reports that she is feeling \"a lot better. \"  She does note that she still has episodes where she feel lack of motivation or feel overwhelmed. She feels that less often she has to \"force myself\" to do things. 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 She notes that she was able to focus much better than she thought she would.   She has been taking propanolol for irritability and anxiety. She feels much more hopeful than she did before. Pt reports that her colitis has dramatically improved and she feels that she can do more and make plans. Pt denies SI and HI.      O:  MSE:  Appearance    alert, cooperative   Appetite abnormal: fluctuates  Sleep disturbance No  Fatigue No  Loss of pleasure Yes  Impulsive behavior No  Speech    spontaneous, normal rate and normal volume  Mood   neutral   Affect    neutral-euthymic affect  Thought Content    intact and helplessness  Thought Process    linear, goal directed and coherent  Associations    logical connections  Insight    fair  Judgment    fair  Orientation    oriented to person, place, time, and general circumstances  Memory    recent and remote memory intact  Attention/Concentration    impaired  Morbid ideation no  Suicide Assessment    no suicidal ideation      History:    Medications:   Current Outpatient Medications   Medication Sig Dispense Refill    HYDROcodone-acetaminophen (NORCO) 7.5-325 MG per tablet Take 1 tablet by mouth every 8 hours as needed for Pain for up to 30 days. Intended supply: 30 days 90 tablet 0    topiramate (TOPAMAX) 100 MG tablet Take 1 tablet by mouth 2 times daily 180 tablet 0    atorvastatin (LIPITOR) 10 MG tablet Take 1 tablet by mouth daily 90 tablet 1    NONFORMULARY daily as needed Lidocaine, ibuprofen, amitriptyline cream.      folic acid (FOLVITE) 1 MG tablet Take 1 tablet by mouth daily 30 tablet 5    amitriptyline (ELAVIL) 100 MG tablet Take 1 tablet by mouth nightly 30 tablet 3    propranolol (INDERAL) 10 MG tablet Take 1 tablet by mouth 3 times daily as needed (for anxiety and irritability) 42 tablet 0    loperamide (IMODIUM) 1 MG/5ML solution Take by mouth 4 times daily as needed for Diarrhea      Wheat Dextrin (BENEFIBER) POWD Take 4 g by mouth 3 times daily (with meals)       No current facility-administered medications for this visit.         Social History:   Social History Relation Age of Onset    Celiac Disease Neg Hx     Crohn's Disease Neg Hx          A:  Administered the PHQ9 which indicates a self report of minimal symptom distress. Pt would benefit from [facility] services to increase coping skills to provide symptom management/control/relief. PHQ Scores 9/11/2020 8/28/2020 8/10/2020 7/31/2020 5/20/2020 5/6/2020 4/15/2020   PHQ2 Score 2 2 3 6 4 6 2   PHQ9 Score 3 5 5 22 10 16 2     Interpretation of Total Score Depression Severity: 1-4 = Minimal depression, 5-9 = Mild depression, 10-14 = Moderate depression, 15-19 = Moderately severe depression, 20-27 = Severe depression      Diagnosis:  Major depressive disorder; recurrent         Diagnosis Date    Anxiety     Depression     Hyperlipidemia     Hypopotassemia     Lumbago     Osteopenia     PTSD (post-traumatic stress disorder)     Pulmonary embolism (HCC)     4 PE in bilat lungs    Spinal stenosis     congenital           Plan:  Pt interventions: Motivational Interviewing to increase patient confidence and compliance with adhering to behavioral change plan, Discussed potential barriers to change, [place]-setting to identify pt's primary goals for [facility] visit / overall health, Supportive techniques and Emphasized self-care as important for managing overall health         Pt Behavioral Change Plan:  Follow up as needed    Please note this report has been partially produced using speech recognition software  And may cause contain errors related to that system including grammar, punctuation and spelling as well as words and phrases that may seem inappropriate. If there are questions or concerns please feel free to contact me to clarify.

## 2020-09-15 ENCOUNTER — VIRTUAL VISIT (OUTPATIENT)
Dept: PHYSICAL MEDICINE AND REHAB | Age: 60
End: 2020-09-15
Payer: MEDICARE

## 2020-09-15 PROCEDURE — 99214 OFFICE O/P EST MOD 30 MIN: CPT | Performed by: PHYSICAL MEDICINE & REHABILITATION

## 2020-09-15 ASSESSMENT — ENCOUNTER SYMPTOMS
EYE REDNESS: 0
EYE PAIN: 0
SHORTNESS OF BREATH: 1
STRIDOR: 0
VOMITING: 0
BOWEL INCONTINENCE: 0
PHOTOPHOBIA: 0
WHEEZING: 0
ABDOMINAL PAIN: 1
DIARRHEA: 0
BLOOD IN STOOL: 0
NAUSEA: 0
CONSTIPATION: 1
COUGH: 0
BACK PAIN: 1
SORE THROAT: 0

## 2020-09-15 NOTE — PROGRESS NOTES
TELEHEALTH EVALUATION -- Audio/Visual (During OUSBM-64 public health emergency)    Due to COVID 19 outbreak, patient's office visit was converted to a virtual visit. Patient was contacted and agreed to proceed with a virtual visit via [facility] [employer]. me  The risks and benefits of converting to a virtual visit were discussed in light of the current infectious disease epidemic. Patient also understood that insurance coverage and co-pays are up to their individual insurance plans. Subjective       This patient has requested an audio/video evaluation for the following concern(s):    HPI:  Back Pain    Neck Pain    Shoulder Pain (Right)   Arm Pain (Right)   Hand Pain (Right)   Medication Refill (Norco, Topamax, Lidocaine, Buderer's Topical )   Pain (Pain Score: 3- With medication)          We again had a long discussion about her use of medications she seems to be using it appropriately. She should however also try injections and we will try to get her scheduled for trigger points or suprascapular blocks as needed however she is afraid of needles and she has reflex sympathetic dystrophy so we do not want to upset her system and she is getting some relief with that orals and the topicals. Is not able to take Cymbalta and NSAIDs dt Lymphocytic colitis. Will need continue Norco to 3 per day as the 2 per day is not controlling her pain. Back Pain   This is a chronic problem. The current episode started more than 1 year ago. The problem occurs constantly. The problem has been gradually worsening since onset. The pain is present in the gluteal, lumbar spine, sacro-iliac and thoracic spine. The quality of the pain is described as aching. The pain radiates to the right thigh. The pain is at a severity of 6/10. The pain is severe. The pain is worse during the day. The symptoms are aggravated by bending. Stiffness is present in the morning.  Associated symptoms include abdominal pain, headaches, leg pain, numbness, paresthesias, tingling and weakness. Pertinent negatives include no bladder incontinence, bowel incontinence, chest pain, dysuria, fever, paresis, pelvic pain, perianal numbness or weight loss. Risk factors include sedentary lifestyle, menopause and lack of exercise. She has tried heat, analgesics, chiropractic manipulation, ice, muscle relaxant, home exercises, NSAIDs and walking for the symptoms. The treatment provided moderate relief. Neck Pain    This is a chronic problem. The current episode started more than 1 year ago. The problem occurs constantly. The problem has been unchanged. The pain is present in the occipital region and right side. The symptoms are aggravated by twisting. The pain is same all the time. Stiffness is present in the morning. Associated symptoms include headaches, leg pain, numbness, tingling and weakness. Pertinent negatives include no chest pain, fever, paresis, photophobia or weight loss. She has tried chiropractic manipulation, heat, home exercises, acetaminophen, bed rest, ice, muscle relaxants, neck support, NSAIDs and oral narcotics for the symptoms. The treatment provided mild relief. Shoulder Pain    Associated symptoms include numbness and tingling. Pertinent negatives include no fever. Arm Pain    The incident occurred more than 1 week ago. There was no injury mechanism. The pain is present in the right clavicle. The quality of the pain is described as cramping and aching. The pain is at a severity of 7/10. The pain is severe. The pain has been fluctuating since the incident. Associated symptoms include numbness and tingling. Pertinent negatives include no chest pain. The symptoms are aggravated by movement. She has tried NSAIDs, immobilization, elevation, acetaminophen and rest for the symptoms. The treatment provided moderate relief. Hand Pain    Associated symptoms include numbness and tingling. Pertinent negatives include no chest pain.    Neurologic Problem   The patient's primary symptoms include focal sensory loss, focal weakness and weakness. The patient's pertinent negatives include no altered mental status, clumsiness, loss of balance, memory loss, near-syncope or slurred speech. This is a chronic problem. The current episode started more than 1 year ago. The neurological problem developed insidiously. The problem has been waxing and waning since onset. There was right-sided and upper extremity focality noted. Associated symptoms include abdominal pain, back pain, fatigue, headaches, neck pain and shortness of breath. Pertinent negatives include no bladder incontinence, bowel incontinence, chest pain, diaphoresis, dizziness, fever, nausea, palpitations or vomiting. Past treatments include walking, acetaminophen, neck support and medication. The treatment provided moderate relief. Her past medical history is significant for mood changes. There is no history of a bleeding disorder, a clotting disorder, a CVA, dementia, head trauma, liver disease or seizures.              Past Medical History:   Diagnosis Date    Anxiety     Depression     Hyperlipidemia     Hypopotassemia     Lumbago     Osteopenia     PTSD (post-traumatic stress disorder)     Pulmonary embolism (HCC)     4 PE in bilat lungs    Spinal stenosis     congenital       Past Surgical History:   Procedure Laterality Date    HYSTERECTOMY      SPINE SURGERY      cervical x 4, fusions and reconstruction    TONSILLECTOMY      age 3       Social History     Socioeconomic History    Marital status:      Spouse name: Deana Jamison Number of children: 1    Years of education:  15    Highest education level: Associate degree: occupational, technical, or vocational program   Occupational History    Occupation: On disability     Comment: Used to work as a  and did odd jobs   Social Needs    Financial resource strain: Not hard at all   [address] insecurity     Worry: Never true     Inability: Never true    Transportation needs     Medical: No     Non-medical: No   Tobacco Use    Smoking status: Current Every Day Smoker     Packs/day: 0.50     Years: 40.00     Pack years: 20.00     Types: Cigarettes    Smokeless tobacco: Never Used   Substance and Sexual Activity    Alcohol use: No    Drug use: Yes     Types: Marijuana     Comment: using CBD drops     Sexual activity: None   Lifestyle    Physical activity     Days per week: 0 days     Minutes per session: 0 min    Stress: To some extent   Relationships    Social connections     Talks on phone: More than three times a week     Gets together: Once a week     Attends Anabaptist service: 1 to 4 times per year     Active member of club or organization: No     Attends meetings of clubs or organizations: Never     Relationship status:     Intimate partner violence     Fear of current or ex partner: No     Emotionally abused: No     Physically abused: No     Forced sexual activity: No   Other Topics Concern    None   Social History Narrative    Lives at Home with her . [facility] [place]     Had 1 son who is now  approximately 3 years ago (heroine addiction). She denies any addiction in herself or her . Family History   Problem Relation Age of Onset    Celiac Disease Neg Hx     Crohn's Disease Neg Hx        Allergies   Allergen Reactions    Latex Anaphylaxis     Rash    Fentanyl Rash     At site of application    Ketoprofen      Rash    Morphine Other (See Comments)     GI upset  Other reaction(s): GI Upset  GI upset    Nsaids     Tetracycline      Rash    Cefaclor Rash     Rash    Cymbalta [Duloxetine Hcl] Nausea And Vomiting    Tetracyclines & Related Rash       Review of Systems   Constitutional: Positive for activity change and fatigue. Negative for chills, diaphoresis, fever and weight loss.    HENT: Negative for congestion, ear discharge, ear pain, hearing loss, nosebleeds, sore throat and tinnitus. Eyes: Negative for photophobia, pain and redness. Respiratory: Positive for shortness of breath. Negative for cough, wheezing and stridor. Shortness of breath on exertion   Cardiovascular: Negative for chest pain, palpitations, leg swelling and near-syncope. Gastrointestinal: Positive for abdominal pain and constipation. Negative for blood in stool, bowel incontinence, diarrhea, nausea and vomiting. Endocrine: Negative for polydipsia. Genitourinary: Negative for bladder incontinence, dysuria, flank pain, frequency, hematuria, pelvic pain and urgency. Musculoskeletal: Positive for back pain, gait problem, myalgias and neck pain. Skin: Negative for rash. Allergic/Immunologic: Positive for immunocompromised state. Negative for environmental allergies. Neurological: Positive for tingling, focal weakness, weakness, numbness, headaches and paresthesias. Negative for dizziness, tremors, seizures and loss of balance. Hematological: Does not bruise/bleed easily. Psychiatric/Behavioral: Negative for hallucinations, memory loss and suicidal ideas. The patient is not nervous/anxious. Objective    There were no vitals filed for this visit.     No data recorded            PHYSICAL EXAMINATION:  [ INSTRUCTIONS:  \"[x]\" Indicates a positive item  \"[]\" Indicates a negative item  -- DELETE ALL ITEMS NOT EXAMINED]    [x] Alert  [x] Oriented to person/place/time      [x] No apparent distress  [x] Not toxic appearing    [x] Face complexion normal appearing [x] Sclera clear  [x] Lips are not cyanotic      [x] Breathing appears normal  [] Appears tachypneic      [] Rash on visible skin    [x] Cranial Nerves II-XII grossly intact    [x] Motor grossly intact in visible upper extremities, including stiff but intact range of motion in cervical, upper extremity and thoracic  [x] Motor grossly intact in visible lower extremities, including normal range of motion in the hips and knees for stiffness in the lumbar spine    [x] Normal Mood  [x] Not anxious appearing    [x] Not depressed appearing  [x] Not confused appearing      [x]  Good short term memory  [x]  Good long term memory    [x]  Able to follow 2-3 step commands    Due to this being a TeleHealth encounter, evaluation of the following organ systems is limited: Vitals/Constitutional/EENT/Resp/CV/GI//MS/Neuro/Skin/Heme-Lymph-Imm. ASSESSMENT/PLAN:     After a thorough review and discussion of the previous medical records, patient comprehensive medical, surgical, and family and social history, Review of Systems, their OARRS, their Screener and Opioid Assessment for Patients with Pain (SOAPP®-R), recent diagnostics, and symptomatic results to previous treatment, it is my impression that the patients is suffering with progressive and severe:     Diagnosis Orders   1. Postlaminectomy syndrome, cervical region     2. Lumbosacral radiculopathy due to trauma     3. Cervical spondylosis with myelopathy     4. Chronic pain disorder     5. Brachial neuritis or radiculitis     6. Anxiety and depression     7. PTSD (post-traumatic stress disorder)     8. Major depressive disorder, recurrent episode, moderate (HCC)         I am also concerned by lifestyle and mood issues including:    Past Medical History:   Diagnosis Date    Anxiety     Depression     Hyperlipidemia     Hypopotassemia     Lumbago     Osteopenia     PTSD (post-traumatic stress disorder)     Pulmonary embolism (HCC)     4 PE in bilat lungs    Spinal stenosis     congenital           Given their medication, chronic pain and lifestyle and medications they are at risk for :    Falls, constipation, addiction  Loss of livelyhood due to severe pain, debility, weight gain and  vitamin D deficiency    The patient was educated regarding proper diet, fitness routine, and regulatory restrictions concerning pain medications.         Previous notes, comprehensive past medical, surgical, family history, and diagnostics were reviewed. Patient education and councelling were provided regarding off label use,treatment options and medication and injection risks. Current and old OARRS ([place] Automated Prescription Reporting System) records reviewed, all refills reviewed since last visit,  Behavioral agreement/BALDEV regulations   and Toxicology screen was reviewed with patient and is up to date. There are no current red flags. They are making good progress regarding pain relief, they are performing at a functional level regarding activities of daily living, family interactions and psychological functioning, they're not having any adverse effects or side effects from the current medications, and I see no findings of aberrant drug taking or addiction related behaviors. The patient is aware that they have a chronic pain condition and they may require opiates dosing for life. All efforts will be made to wean to the lowest effective dose. Other therapies for pain have not been effective including nonopiate medications. Injections and exercises are only partially effective. A Rx for Narcan was offered to help prevent accidental overdose. RX Monitoring 7/7/2020   Acute Pain Prescriptions -   Periodic Controlled Substance Monitoring Possible medication side effects, risk of tolerance/dependence & alternative treatments discussed. ;No signs of potential drug abuse or diversion identified. ;Assessed functional status. ;Obtaining appropriate analgesic effect of treatment. Chronic Pain > 50 MEDD Re-evaluated the status of the patient's underlying condition causing pain. ;Considered consultation with a specialist.;Obtained or confirmed \"Consent for Opioid Use\" on file. Patient is currently taking:       I am having Az Cathryn. José Miguel maintain her Benefiber, loperamide, propranolol, amitriptyline, NONFORMULARY, folic acid, atorvastatin, HYDROcodone-acetaminophen, and topiramate.               I also recommend the following Medications:    No orders of the defined types were placed in this encounter.       -which helps with pain and function. Otherwise, continue the current pain medications that I have prescibed. Radiologic:   Old films reviewed    November 23, 2012    Technique: CT ABDOMEN PELVIS W IV CONTRAST Helically Acquired CT of the  abdomen and pelvis was performed during the intravenous infusion of 100 mL of Isovue-370. Axial delayed images were also performed. Reformatted sagittal and coronal images were also     obtained.         Findings: The visualized bases of the lungs contain no acute infiltrate, pleural effusion or consolidation. .         The liver, spleen, pancreas and gallbladder are unremarkable. 1.0 x 1.9 cm nodule is seen in the right adrenal gland. Bilaterally both kidneys show uptake and excretion of contrast with no evidence for hydronephrosis or renal calculi. The appendix is    normal. No aneurysm or dissection is present. Mild vascular calcifications are seen. A tiny umbilical hernia is noted that contains fat.         No abnormally dilated loops of bowel, free air or free fluid seen.  Within the pelvis the uterus is absent. A 3.4 x 3.7 cm cyst is seen in the right ovary. . Temi Eckert contrast is seen layering in the bladder.  No bladder wall thickening is seen. Mild    degenerative changes are seen in the spine. The osseous structures contain no destructive lesions.                   Impression    Impression:    1.  There is no evidence for obstruction.  No appendicitis or diverticulitis seen. 2. No hydronephrosis is seen    3. A cyst is seen in the right ovary. ,     I discussed results with patients. see Follow up plans below  For any new studies. Care Everywhere Updates:  requested and reviewed. No new issues noted. Electrodiagnostic:  Previous studies requested,     I discussed results with patient.       See follow-up plans for new studies. Labs:  Previous labs reviewed     Lab Results   Component Value Date     08/05/2020    K 4.2 08/05/2020     08/05/2020    CO2 25 08/05/2020    BUN 12 08/05/2020    CREATININE 0.79 08/05/2020    CALCIUM 9.9 08/05/2020    LABALBU 4.6 08/05/2020    BILITOT 0.4 08/05/2020    ALKPHOS 95 08/05/2020    AST 18 08/05/2020    ALT 9 08/05/2020     Lab Results   Component Value Date    WBC 13.5 08/05/2020    RBC 5.34 08/05/2020    HGB 16.5 08/05/2020    HCT 50.3 08/05/2020    MCV 94.3 08/05/2020    MCH 30.9 08/05/2020    MCHC 32.7 08/05/2020    RDW 13.6 08/05/2020     08/05/2020    MPV 8.3 12/10/2012       Lab Results   Component Value Date    LABAMPH Neg 05/26/2020    BARBSCNU Neg 05/26/2020    LABBENZ Neg 05/26/2020    CANSU POSITIVE 05/26/2020    COCAIMETSCRU Neg 05/26/2020    PHENCYCLIDINESCREENURINE Neg 05/26/2020    DSCOMMENT see below 05/26/2020       No results found for: CODEINE, MORPHINE, ACETYLMORPHI, OXYCODONE, NOROXYCODONE, NOROXYMU, HYDRCO, NORHYDU, HYDROMO, BUPREN, NORBUPRNOR, FENTA, NORFENT, MEPERIDINE, TAPENU, TAPOSULFUR, METHADONE, LABPROP, TRAM, AMPH, METHAMP, MDMA, ECMDA    No results found for: METPHEN, PHENTERMINE, BENZOYL, ALPRAZ, ALPHAOHALPRA, CLONAZEPAM, 7AMINOCLONAZ, DIAZEP, JUSTINA, OXAZ, TEMAZ, LORAZEPAM, MIDAZOLAM, ZOLPIDEM, ISRA, ETG, MARIJMET, PCP, PAINMGTDRUGP, EERPAINMGTPA, LABCREA      , I discussed results with patient. See follow-up plans for new studies. Therapies:  HEP-gentle stretching and relaxation techniques-demonstrated with patient-they are to do them twice a day. They are also advised to make the following lifestyle changes:  Goals      SOAPP-R      6-2-2020 score: 11 - moderate risk             Injections or Epidurals:  Injection options were discussed. Patient gave verbal consent to ordered injections. See follow-up plans for planned injections.     Supplements:  Vitamin D with increased dosing during the rainy months,   Education was given on:   Dietary and Fitness--daily stretches and low carb diet-in chair Yoga when possible             Follow up with Primary Care Physician regarding their general medical needs. Stressed the importance of following up with PCP and specialists for his/her chronic diseases, health, CV, and cancer screening and continued care. Will follow disease activity/progression and adjust therapeutic regimen to disease activity and severity. Discussed medication dosage, usage, goals of therapy, and side effects. Available test results were reviewed -Discussed findings, impression and plan with patient. An additional 20 minutes were spent outside of the patient visit to review records. Additional time spent with the patient to discuss their questions. Additional time spent with the patient devoted to discussing treatment strategy, planning, and implementation. Patient understands above plan; questions asked and answered. Patient agrees to plan as noted above. F/U in 2-3months  At least 50% of the visit was involved in the discussion of the options for treatment. We discussed exercises, medication, interventional therapies and surgery. Healthy life style is essential with patient hard work to achieve the wellness. In addition; discussion with the patient and/or family about any of the diagnostic results, impressions and/or recommended diagnostic studies, prognosis, risks and benefits of treatment options, instructions for treatment and/or follow-up, importance of compliance with chosen treatment options, risk-factor reduction, and patient/family education. They are to follow up in 2 months to review medication, efficacy of injections, pill counts, OARRS check, SOAPPR assessment, review diagnostics, to review previous and future treatment plans and assess appropriateness for continued therapy. New Diagnostics  No orders of the defined types were placed in this encounter.       Follow-up in 2-1/2 to 3 months for guarding the efficacy of current plan and future treatment. An  electronic signature was used to authenticate this note. -Dr Emily Chan, DO       With MA assistance from:   Tracy Martínez MA     [id number]}    Pursuant to the emergency declaration under the [facility] waiver authority and the [employer] Act, this Virtual  Visit was conducted, with patient's consent, to reduce the patient's risk of exposure to COVID-19 and provide continuity of care for an established patient. Services were provided through a video synchronous discussion virtually to substitute for in-person clinic visit.

## 2020-09-16 ENCOUNTER — HOSPITAL ENCOUNTER (OUTPATIENT)
Dept: WOMENS IMAGING | Age: 60
Discharge: HOME | End: 2020-09-18
Payer: MEDICARE

## 2020-09-16 PROCEDURE — 77067 SCR MAMMO BI INCL CAD: CPT

## 2020-09-24 DIAGNOSIS — D72.829 LEUKOCYTOSIS, UNSPECIFIED TYPE: ICD-10-CM

## 2020-09-24 LAB
BASOPHILS ABSOLUTE: 0.1 K/UL (ref 0–0.2)
BASOPHILS RELATIVE PERCENT: 0.7 %
EOSINOPHILS ABSOLUTE: 0.2 K/UL (ref 0–0.7)
EOSINOPHILS RELATIVE PERCENT: 2 %
HCT VFR BLD CALC: 44.1 % (ref 37–47)
HEMOGLOBIN: 14.9 G/DL (ref 12–16)
LYMPHOCYTES ABSOLUTE: 3.9 K/UL (ref 1–4.8)
LYMPHOCYTES RELATIVE PERCENT: 39.7 %
MCH RBC QN AUTO: 30.8 PG (ref 27–31.3)
MCHC RBC AUTO-ENTMCNC: 33.7 % (ref 33–37)
MCV RBC AUTO: 91.4 FL (ref 82–100)
MONOCYTES ABSOLUTE: 0.5 K/UL (ref 0.2–0.8)
MONOCYTES RELATIVE PERCENT: 5.6 %
NEUTROPHILS ABSOLUTE: 5.1 K/UL (ref 1.4–6.5)
NEUTROPHILS RELATIVE PERCENT: 52 %
PDW BLD-RTO: 13.5 % (ref 11.5–14.5)
PLATELET # BLD: 298 K/UL (ref 130–400)
RBC # BLD: 4.83 M/UL (ref 4.2–5.4)
WBC # BLD: 9.8 K/UL (ref 4.8–10.8)

## 2020-10-07 ENCOUNTER — VIRTUAL VISIT (OUTPATIENT)
Dept: BEHAVIORAL/MENTAL HEALTH CLINIC | Age: 60
End: 2020-10-07
Payer: MEDICARE

## 2020-10-07 PROCEDURE — 99214 OFFICE O/P EST MOD 30 MIN: CPT | Performed by: PSYCHIATRY & NEUROLOGY
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
## 2020-10-09 RX ORDER — HYDROCODONE BITARTRATE AND ACETAMINOPHEN 7.5; 325 MG/1; MG/1
1 TABLET ORAL EVERY 8 HOURS PRN
Qty: 90 TABLET | Refills: 0 | Status: SHIPPED | OUTPATIENT
Start: 2020-10-09 | End: 2020-11-30 | Stop reason: SDUPTHER

## 2020-10-20 ENCOUNTER — VIRTUAL VISIT (OUTPATIENT)
Dept: FAMILY MEDICINE CLINIC | Age: 60
End: 2020-10-20
Payer: MEDICARE

## 2020-10-20 PROCEDURE — 99213 OFFICE O/P EST LOW 20 MIN: CPT | Performed by: NURSE PRACTITIONER

## 2020-10-20 ASSESSMENT — ENCOUNTER SYMPTOMS
SHORTNESS OF BREATH: 0
COUGH: 0

## 2020-10-20 NOTE — PROGRESS NOTES
10/20/2020    TELEHEALTH EVALUATION -- Audio/Visual (During FJXPO-27 public health emergency)    Due to COVID 19 outbreak, patient's office visit was converted to a virtual visit. Patient was contacted and agreed to proceed with a virtual visit via [email or web address] me  The risks and benefits of converting to a virtual visit were discussed in light of the current infectious disease epidemic. Patient also understood that insurance coverage and co-pays are up to their individual insurance plans. HPI:    Yahaira Flores (:  1960) has requested an audio/video evaluation for the following concern(s): Anxiety- has been speaking with Dr. Stephany Dueñas and Dr. Rico Benitez. Takes propanolol intermittently. Hyperlipidemia- taking the lipitor. lymphocytic colitis- still having intermittent stomach issues. Questioning Lipitor as cause? Chronic pain issues- seeing Dr. Christine Brar doing well with that. Taking centrum silver daily. Review of Systems   Constitutional: Negative for fatigue. Respiratory: Negative for cough and shortness of breath. Cardiovascular: Negative for chest pain. Prior to Visit Medications    Medication Sig Taking? Authorizing Provider   HYDROcodone-acetaminophen (NORCO) 7.5-325 MG per tablet Take 1 tablet by mouth every 8 hours as needed for Pain for up to 30 days.  Intended supply: 30 days Yes Nicolle Sanchez DO   topiramate (TOPAMAX) 100 MG tablet Take 1 tablet by mouth 2 times daily Yes Nicolle Sanchez DO   atorvastatin (LIPITOR) 10 MG tablet Take 1 tablet by mouth daily Yes Hale Roots, APRN - CNP   NONFORMULARY daily as needed Lidocaine, ibuprofen, amitriptyline cream. Yes Historical Provider, MD   folic acid (FOLVITE) 1 MG tablet Take 1 tablet by mouth daily Yes Hale Roots, APRN - CNP   amitriptyline (ELAVIL) 100 MG tablet Take 1 tablet by mouth nightly Yes Emilie Swift MD   propranolol (INDERAL) 10 MG tablet Take 1 tablet by mouth 3 times daily as needed (for anxiety and irritability) Yes Oc Clay MD   Wheat Dextrin (BENEFIBER) POWD Take 4 g by mouth 3 times daily (with meals) Yes Historical Provider, MD   loperamide (IMODIUM) 1 MG/5ML solution Take by mouth 4 times daily as needed for Diarrhea  Historical Provider, MD       Social History     Tobacco Use    Smoking status: Current Every Day Smoker     Packs/day: 0.50     Years: 40.00     Pack years: 20.00     Types: Cigarettes    Smokeless tobacco: Never Used   Substance Use Topics    Alcohol use: No    Drug use: Yes     Types: Marijuana     Comment: using CBD drops         Allergies   Allergen Reactions    Latex Anaphylaxis     Rash    Fentanyl Rash     At site of application    Ketoprofen      Rash    Morphine Other (See Comments)     GI upset  Other reaction(s): GI Upset  GI upset    Nsaids     Tetracycline      Rash    Cefaclor Rash     Rash    Cymbalta [Duloxetine Hcl] Nausea And Vomiting    Tetracyclines & Related Rash   ,   Past Medical History:   Diagnosis Date    Anxiety     Depression     Hyperlipidemia     Hypopotassemia     Lumbago     Osteopenia     PTSD (post-traumatic stress disorder)     Pulmonary embolism (HCC)     4 PE in bilat lungs    Spinal stenosis     congenital   ,   Past Surgical History:   Procedure Laterality Date    HYSTERECTOMY      SPINE SURGERY      cervical x 4, fusions and reconstruction    TONSILLECTOMY      age 3   ,   Social History     Tobacco Use    Smoking status: Current Every Day Smoker     Packs/day: 0.50     Years: 40.00     Pack years: 20.00     Types: Cigarettes    Smokeless tobacco: Never Used   Substance Use Topics    Alcohol use: No    Drug use: Yes     Types: Marijuana     Comment: using CBD drops    ,   Family History   Problem Relation Age of Onset    Celiac Disease Neg Hx     Crohn's Disease Neg Hx        PHYSICAL EXAMINATION:  [ INSTRUCTIONS:  \"[x]\" Indicates a positive item  \"[]\" Indicates a negative item  -- DELETE ALL ITEMS NOT EXAMINED]  [x] Alert  [x] Oriented to person/place/time    [x] No apparent distress  [] Toxic appearing    [] Face flushed appearing [x] Sclera clear  [] Lips are cyanotic      [x] Breathing appears normal  [] Appears tachypneic      [] Rash on visible skin    [x] Cranial Nerves II-XII grossly intact    [x] Motor grossly intact in visible upper extremities    [x] Motor grossly intact in visible lower extremities    [x] Normal Mood  [] Anxious appearing    [] Depressed appearing  [] Confused appearing      [] Poor short term memory  [] Poor long term memory    [] OTHER:      Due to this being a TeleHealth encounter, evaluation of the following organ systems is limited: Vitals/Constitutional/EENT/Resp/CV/GI//MS/Neuro/Skin/Heme-Lymph-Imm. ASSESSMENT/PLAN:  1. Anxiety and depression  - cont fu with psychology and psychiatry    2. Folic acid deficiency  - is going to take multivitamin as she states that folic acid causes \"bad taste\" in mouth    3. Lymphocytic colitis  - cont to fu with GI as instructed. 4. Hyperlipidemia, unspecified hyperlipidemia type  - cont lipitor    5. Chronic pain syndrome  - cont fu with Dr. Nestor Vincent      Side effects, adverse effects of the medication prescribed today, as well as treatment plan/ rationale and result expectations have been discussed with the patient who expresses understanding and desires to proceed. Close follow up to evaluate treatment results and for coordination of care. I have reviewed the patient's medical history in detail and updated the computerized patient record. As always, patient is advised that if symptoms worsen in any way they will proceed to the nearest emergency room. Return in about 6 months (around 4/20/2021). An  electronic signature was used to authenticate this note.     --RINA Pena - CNP on 10/20/2020 at 4:40 PM    \    Pursuant to the emergency declaration under the [facility], [address]

## 2020-11-17 ENCOUNTER — VIRTUAL VISIT (OUTPATIENT)
Dept: PHYSICAL MEDICINE AND REHAB | Age: 60
End: 2020-11-17
Payer: MEDICARE

## 2020-11-17 PROBLEM — Z79.899 MEDICAL MARIJUANA USE: Status: ACTIVE | Noted: 2020-05-27

## 2020-11-17 PROCEDURE — 99214 OFFICE O/P EST MOD 30 MIN: CPT | Performed by: PHYSICAL MEDICINE & REHABILITATION

## 2020-11-17 RX ORDER — HYDROCODONE BITARTRATE AND ACETAMINOPHEN 7.5; 325 MG/1; MG/1
1 TABLET ORAL EVERY 8 HOURS PRN
Qty: 90 TABLET | Refills: 0 | Status: CANCELLED | OUTPATIENT
Start: 2020-11-17 | End: 2020-12-17

## 2020-11-17 ASSESSMENT — ENCOUNTER SYMPTOMS
ABDOMINAL PAIN: 1
VOMITING: 0
NAUSEA: 0
SHORTNESS OF BREATH: 1
PHOTOPHOBIA: 0
BACK PAIN: 1
BOWEL INCONTINENCE: 0

## 2020-11-17 NOTE — PROGRESS NOTES
TELEHEALTH EVALUATION -- Audio/Visual (During IQMUW-52 public health emergency)    Due to COVID 19 outbreak, patient's office visit was converted to a virtual visit. Patient was contacted and agreed to proceed with a virtual visit via  [employer] me   The risks and benefits of converting to a virtual visit were discussed in light of the current infectious disease epidemic. Patient also understood that insurance coverage and co-pays are up to their individual insurance plans. Subjective       This patient has requested an audio/video evaluation for the following concern(s):    HPI:    Back Pain    Neck Pain    Shoulder Pain (Right)   Arm Pain (Right)   Hand Pain (Right)   Medication Refill (Norco, Topamax, Lidocaine. Buderer's Topical, Vit D refill today)   Pain (4- With medication (Arm))            We again had a long discussion about her use of medications she is now regularly taking medical marijuana which in the state of [place] per guidelines of Federal BALDEV is not compatible with Norco.  I am encouraging her to pick on or the other. Recently on the OARS--there are many fills of the [place]. Back Pain   This is a chronic problem. The current episode started more than 1 year ago. The problem occurs constantly. The problem has been gradually worsening since onset. The pain is present in the gluteal, lumbar spine, sacro-iliac and thoracic spine. The quality of the pain is described as aching. The pain radiates to the right thigh. The pain is at a severity of 6/10. The pain is severe. The pain is worse during the day. The symptoms are aggravated by bending. Stiffness is present in the morning. Associated symptoms include abdominal pain, headaches, leg pain, numbness, paresthesias, tingling and weakness. Pertinent negatives include no bladder incontinence, bowel incontinence, chest pain, dysuria, fever, paresis, pelvic pain, perianal numbness or weight loss.  Risk factors include sedentary lifestyle, menopause and lack of exercise. She has tried heat, analgesics, chiropractic manipulation, ice, muscle relaxant, home exercises, NSAIDs and walking for the symptoms. The treatment provided moderate relief. Neck Pain    This is a chronic problem. The current episode started more than 1 year ago. The problem occurs constantly. The problem has been unchanged. The pain is present in the occipital region and right side. The symptoms are aggravated by twisting. The pain is same all the time. Stiffness is present in the morning. Associated symptoms include headaches, leg pain, numbness, tingling and weakness. Pertinent negatives include no chest pain, fever, paresis, photophobia or weight loss. She has tried chiropractic manipulation, heat, home exercises, acetaminophen, bed rest, ice, muscle relaxants, neck support, NSAIDs and oral narcotics for the symptoms. The treatment provided mild relief. Neurologic Problem   The patient's primary symptoms include focal sensory loss, focal weakness and weakness. The patient's pertinent negatives include no altered mental status, clumsiness, loss of balance, memory loss, near-syncope or slurred speech. This is a chronic problem. The current episode started more than 1 year ago. The neurological problem developed insidiously. The problem has been waxing and waning since onset. There was right-sided and upper extremity focality noted. Associated symptoms include abdominal pain, back pain, fatigue, headaches, neck pain and shortness of breath. Pertinent negatives include no bladder incontinence, bowel incontinence, chest pain, diaphoresis, dizziness, fever, nausea, palpitations or vomiting. Past treatments include walking, acetaminophen, neck support and medication. The treatment provided moderate relief. Her past medical history is significant for mood changes. There is no history of a bleeding disorder, a clotting disorder, a CVA, dementia, head trauma, liver disease or seizures. Past Medical History:   Diagnosis Date    Anxiety     Depression     Hyperlipidemia     Hypopotassemia     Lumbago     Osteopenia     PTSD (post-traumatic stress disorder)     Pulmonary embolism (HCC)     4 PE in bilat lungs    Spinal stenosis     congenital       Past Surgical History:   Procedure Laterality Date    HYSTERECTOMY      SPINE SURGERY      cervical x 4, fusions and reconstruction    TONSILLECTOMY      age 3       Social History     Socioeconomic History    Marital status:      Spouse name: Mike Parmar Number of children: 1    Years of education:  15    Highest education level: Associate degree: occupational, technical, or vocational program   Occupational History    Occupation: On disability     Comment: Used to work as a  and did odd jobs   Social Needs    Financial resource strain: Not hard at all   [employer] insecurity     Worry: Never true     Inability: Never true    Transportation needs     Medical: No     Non-medical: No   Tobacco Use    Smoking status: Current Every Day Smoker     Packs/day: 0.50     Years: 40.00     Pack years: 20.00     Types: Cigarettes    Smokeless tobacco: Never Used   Substance and Sexual Activity    Alcohol use: No    Drug use: Yes     Types: Marijuana     Comment: Medical Marijuana; CBD drops     Sexual activity: None   Lifestyle    Physical activity     Days per week: 0 days     Minutes per session: 0 min    Stress:  To some extent   Relationships    Social connections     Talks on phone: More than three times a week     Gets together: Once a week     Attends Confucianist service: 1 to 4 times per year     Active member of club or organization: No     Attends meetings of clubs or organizations: Never     Relationship status:     Intimate partner violence     Fear of current or ex partner: No     Emotionally abused: No     Physically abused: No     Forced sexual activity: No   Other Topics Concern    None   Social History [place]    [facility]      Had 1 son who is now  approximately 3 years ago (heroine addiction). She denies any addiction in herself or her . But she is on [address]. Family History   Problem Relation Age of Onset    Celiac Disease Neg Hx     Crohn's Disease Neg Hx        Allergies   Allergen Reactions    Latex Anaphylaxis     Rash    Fentanyl Rash     At site of application    Ketoprofen      Rash    Morphine Other (See Comments)     GI upset  Other reaction(s): GI Upset  GI upset    Nsaids     Tetracycline      Rash    Cefaclor Rash     Rash    Cymbalta [Duloxetine Hcl] Nausea And Vomiting    Tetracyclines & Related Rash       Review of Systems   Constitutional: Positive for fatigue. Negative for diaphoresis, fever and weight loss. Eyes: Negative for photophobia. Respiratory: Positive for shortness of breath. Cardiovascular: Negative for chest pain, palpitations and near-syncope. Gastrointestinal: Positive for abdominal pain. Negative for bowel incontinence, nausea and vomiting. Genitourinary: Negative for bladder incontinence, dysuria and pelvic pain. Musculoskeletal: Positive for back pain and neck pain. Neurological: Positive for tingling, focal weakness, weakness, numbness, headaches and paresthesias. Negative for dizziness and loss of balance. Psychiatric/Behavioral: Negative for memory loss. Objective    There were no vitals filed for this visit.     No data recorded            PHYSICAL EXAMINATION:  [ INSTRUCTIONS:  \"[x]\" Indicates a positive item  \"[]\" Indicates a negative item  -- DELETE ALL ITEMS NOT EXAMINED]    [x] Alert  [x] Oriented to person/place/time      [x] No apparent distress  [x] Not toxic appearing    [x] Face complexion normal appearing [x] Sclera clear  [x] Lips are not cyanotic      [x] Breathing appears normal  [] Appears tachypneic      [] Rash on visible ([facility] [address]     4 PE in bilat lungs    Spinal stenosis     congenital           Given their medication, chronic pain and lifestyle and medications they are at risk for :    Falls, constipation, addiction  Loss of livelyhood due to severe pain, debility, weight gain and  vitamin D deficiency    The patient was educated regarding proper diet, fitness routine, and regulatory restrictions concerning pain medications. Previous notes, comprehensive past medical, surgical, family history, and diagnostics were reviewed. Patient education and councelling were provided regarding off label use,treatment options and medication and injection risks. Overall greater than 25 minutes spent in direct and indirect patient care. Current and old OARRS ([place] Automated Prescription Reporting System) records reviewed, all refills reviewed since last visit,  Behavioral agreement/BALDEV regulations   and Toxicology screen was reviewed with patient and is up to date. There are current red flags. RX Monitoring 7/7/2020   Acute Pain Prescriptions -   Periodic Controlled Substance Monitoring Possible medication side effects, risk of tolerance/dependence & alternative treatments discussed. ;No signs of potential drug abuse or diversion identified. ;Assessed functional status. ;Obtaining appropriate analgesic effect of treatment. Chronic Pain > 50 MEDD Re-evaluated the status of the patient's underlying condition causing pain. ;Considered consultation with a specialist.;Obtained or confirmed \"Consent for Opioid Use\" on file. Patient is currently taking:       I am having Almon Plum. José Miguel maintain her Benefiber, loperamide, propranolol, amitriptyline, NONFORMULARY, folic acid, atorvastatin, and topiramate. I also recommend the following Medications:    No orders of the defined types were placed in this encounter.       -which helps with pain and function.     Otherwise, continue the current pain medications the following lifestyle changes:  Goals      SOAPP-R      6-2-2020 score: 11 - moderate risk             Injections or Epidurals:  Injection options were discussed. Monthly trigger point injections add vitamin B12 when able. Patient gave verbal consent to ordered injections. See follow-up plans for planned injections. Supplements:  Vitamin D with increased dosing during the rainy months, add co-Q10 for heart health. Education was given on:   Dietary and Fitness--daily stretches and low carb diet-in chair Yoga when possible             Follow up with Primary Care Physician regarding their general medical needs. Stressed the importance of following up with PCP and specialists for his/her chronic diseases, health, CV, and cancer screening and continued care. Will follow disease activity/progression and adjust therapeutic regimen to disease activity and severity. Discussed medication dosage, usage, goals of therapy, and side effects. Available test results were reviewed -Discussed findings, impression and plan with patient. An additional 10 minutes were spent outside of the patient visit to review records. Additional time spent with the patient to discuss their questions. Additional time spent with the patient devoted to discussing treatment strategy, planning, and implementation generalized musculoskeletal health plan. Patient understands above plan; questions asked and answered. Patient agrees to plan as noted above. F/U in 2-3months  At least 50% of the visit was involved in the discussion of the options for treatment. We discussed exercises, medication, interventional therapies and surgery. Healthy life style is essential with patient hard work to achieve the wellness.  In addition; discussion with the patient and/or family about any of the diagnostic results, impressions and/or recommended diagnostic studies, prognosis, risks and benefits of treatment options, instructions for treatment and/or follow-up, importance of compliance with chosen treatment options, risk-factor reduction, and patient/family education. New Diagnostics  No orders of the defined types were placed in this encounter. Follow-up in 2-1/2 to 3 months for guarding the efficacy of current plan and future treatment. An  electronic signature was used to authenticate this note. -Dr Nicko Todd, DO       With MA assistance from:   Nikki Garland MA     19}    Pursuant to the emergency declaration under the [facility] waiver authority and the [employer] and [employer] Act, this Virtual  Visit was conducted, with patient's consent, to reduce the patient's risk of exposure to COVID-19 and provide continuity of care for an established patient. Services were provided through a video synchronous discussion virtually to substitute for in-person clinic visit.

## 2020-11-24 ENCOUNTER — VIRTUAL VISIT (OUTPATIENT)
Dept: BEHAVIORAL/MENTAL HEALTH CLINIC | Age: 60
End: 2020-11-24
Payer: MEDICARE

## 2020-11-24 PROCEDURE — 99214 OFFICE O/P EST MOD 30 MIN: CPT | Performed by: PSYCHIATRY & NEUROLOGY

## 2020-11-24 RX ORDER — FOLIC ACID 1 MG/1
1 TABLET ORAL DAILY
Qty: 30 TABLET | Refills: 5 | Status: SHIPPED | OUTPATIENT
Start: 2020-11-24 | End: 2021-05-20

## 2020-11-24 RX ORDER — AMITRIPTYLINE HYDROCHLORIDE 50 MG/1
TABLET, FILM COATED ORAL
Qty: 87 TABLET | Refills: 0 | Status: SHIPPED | OUTPATIENT
Start: 2020-11-24 | End: 2020-12-15

## 2020-11-24 RX ORDER — PROPRANOLOL HYDROCHLORIDE 10 MG/1
10 TABLET ORAL 3 TIMES DAILY PRN
Qty: 90 TABLET | Refills: 1 | Status: SHIPPED | OUTPATIENT
Start: 2020-11-24 | End: 2021-05-20
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
## 2020-11-30 ENCOUNTER — TELEMEDICINE (OUTPATIENT)
Dept: FAMILY MEDICINE CLINIC | Age: 60
End: 2020-11-30
Payer: MEDICARE

## 2020-11-30 PROCEDURE — 99213 OFFICE O/P EST LOW 20 MIN: CPT | Performed by: NURSE PRACTITIONER

## 2020-11-30 RX ORDER — HYDROCODONE BITARTRATE AND ACETAMINOPHEN 7.5; 325 MG/1; MG/1
1 TABLET ORAL EVERY 8 HOURS PRN
Qty: 90 TABLET | Refills: 0 | Status: SHIPPED | OUTPATIENT
Start: 2020-11-30 | End: 2021-01-14 | Stop reason: SDUPTHER

## 2020-11-30 ASSESSMENT — ENCOUNTER SYMPTOMS
BACK PAIN: 1
COUGH: 0
SHORTNESS OF BREATH: 0

## 2020-11-30 NOTE — PROGRESS NOTES
2020    TELEHEALTH EVALUATION -- Audio/Visual (During EUUXR-21 public health emergency)    Due to COVID 19 outbreak, patient's office visit was converted to a virtual visit. Patient was contacted and agreed to proceed with a virtual visit via [email or web address] me  The risks and benefits of converting to a virtual visit were discussed in light of the current infectious disease epidemic. Patient also understood that insurance coverage and co-pays are up to their individual insurance plans. HPI:    Polo Ledesma (:  1960) has requested an audio/video evaluation for the following concern(s):    Recently taken off norco for pain by Dr. Shant Trevizo. Has been taking less CBD drops with Maria R Bump last pill on Friday. Has referral for [facility] pain management. Requesting refill until she can get into pain management. Has been following with Dr. Mattie Cameron for the depression. Using lidocaine which helps. Review of Systems   Constitutional: Negative for fatigue. Respiratory: Negative for cough and shortness of breath. Cardiovascular: Negative for chest pain. Musculoskeletal: Positive for arthralgias and back pain. Prior to Visit Medications    Medication Sig Taking? Authorizing Provider   Multiple Vitamin (MULTI-VITAMIN DAILY PO) Take by mouth Yes Historical Provider, MD   HYDROcodone-acetaminophen (NORCO) 7.5-325 MG per tablet Take 1 tablet by mouth every 8 hours as needed for Pain for up to 30 days. Intended supply: 30 days Yes RINA Alves - CNP   folic acid (FOLVITE) 1 MG tablet Take 1 tablet by mouth daily Yes Jairon Love MD   amitriptyline (ELAVIL) 50 MG tablet Take 2.5 tablets by mouth nightly for 7 days, THEN 3 tablets nightly for 23 days.  Yes Jairon Love MD   propranolol (INDERAL) 10 MG tablet Take 1 tablet by mouth 3 times daily as needed (for anxiety and irritability) Yes Jairon Love MD   topiramate (TOPAMAX) 100 MG tablet Take 1 tablet by mouth 2 times daily Yes Nicolle Sanchez DO   atorvastatin (LIPITOR) 10 MG tablet Take 1 tablet by mouth daily Yes RINA Gillette CNP   amitriptyline (ELAVIL) 100 MG tablet Take 1 tablet by mouth nightly Yes Aquilino Damon MD   Wheat Dextrin (BENEFIBER) POWD Take 4 g by mouth 3 times daily (with meals) Yes Historical Provider, MD   NONFORMULARY daily as needed Lidocaine, ibuprofen, amitriptyline cream.  Historical Provider, MD   loperamide (IMODIUM) 1 MG/5ML solution Take by mouth 4 times daily as needed for Diarrhea  Historical Provider, MD       Social History     Tobacco Use    Smoking status: Current Every Day Smoker     Packs/day: 0.50     Years: 40.00     Pack years: 20.00     Types: Cigarettes    Smokeless tobacco: Never Used   Substance Use Topics    Alcohol use: No    Drug use: Yes     Types: Marijuana     Comment: Medical Marijuana; CBD drops         Allergies   Allergen Reactions    Latex Anaphylaxis     Rash    Fentanyl Rash     At site of application    Ketoprofen      Rash    Morphine Other (See Comments)     GI upset  Other reaction(s): GI Upset  GI upset    Nsaids     Tetracycline      Rash    Cefaclor Rash     Rash    Cymbalta [Duloxetine Hcl] Nausea And Vomiting    Tetracyclines & Related Rash   ,   Past Medical History:   Diagnosis Date    Anxiety     Depression     Hyperlipidemia     Hypopotassemia     Lumbago     Osteopenia     PTSD (post-traumatic stress disorder)     Pulmonary embolism (HCC)     4 PE in bilat lungs    Spinal stenosis     congenital   ,   Past Surgical History:   Procedure Laterality Date    HYSTERECTOMY      SPINE SURGERY      cervical x 4, fusions and reconstruction    TONSILLECTOMY      age 3   ,   Social History     Tobacco Use    Smoking status: Current Every Day Smoker     Packs/day: 0.50     Years: 40.00     Pack years: 20.00     Types: Cigarettes    Smokeless tobacco: Never Used   Substance Use Topics    Alcohol use: No    Drug use:  Yes Types: Marijuana     Comment: Medical Marijuana; CBD drops    ,   Family History   Problem Relation Age of Onset    Celiac Disease Neg Hx     Crohn's Disease Neg Hx        PHYSICAL EXAMINATION:  [ INSTRUCTIONS:  \"[x]\" Indicates a positive item  \"[]\" Indicates a negative item  -- DELETE ALL ITEMS NOT EXAMINED]  [x] Alert  [x] Oriented to person/place/time    [x] No apparent distress  [] Toxic appearing    [] Face flushed appearing [] Sclera clear  [] Lips are cyanotic      [x] Breathing appears normal  [] Appears tachypneic      [] Rash on visible skin    [x] Cranial Nerves II-XII grossly intact    [x] Motor grossly intact in visible upper extremities    [x] Motor grossly intact in visible lower extremities    [x] Normal Mood  [] Anxious appearing    [] Depressed appearing  [] Confused appearing      [] Poor short term memory  [] Poor long term memory    [] OTHER:      Due to this being a TeleHealth encounter, evaluation of the following organ systems is limited: Vitals/Constitutional/EENT/Resp/CV/GI//MS/Neuro/Skin/Heme-Lymph-Imm. ASSESSMENT/PLAN:  1. Lumbosacral radiculopathy due to trauma    - HYDROcodone-acetaminophen (NORCO) 7.5-325 MG per tablet; Take 1 tablet by mouth every 8 hours as needed for Pain for up to 30 days. Intended supply: 30 days  Dispense: 90 tablet; Refill: 0    2. Bilateral low back pain with sciatica, sciatica laterality unspecified, unspecified chronicity    - HYDROcodone-acetaminophen (NORCO) 7.5-325 MG per tablet; Take 1 tablet by mouth every 8 hours as needed for Pain for up to 30 days. Intended supply: 30 days  Dispense: 90 tablet; Refill: 0    3. Chronic pain disorder    - HYDROcodone-acetaminophen (NORCO) 7.5-325 MG per tablet; Take 1 tablet by mouth every 8 hours as needed for Pain for up to 30 days. Intended supply: 30 days  Dispense: 90 tablet; Refill: 0    4. Cervical spondylosis with myelopathy    - HYDROcodone-acetaminophen (NORCO) 7.5-325 MG per tablet;  Take 1 tablet by mouth every 8 hours as needed for Pain for up to 30 days. Intended supply: 30 days  Dispense: 90 tablet; Refill: 0    5. Anxiety and depression    - HYDROcodone-acetaminophen (NORCO) 7.5-325 MG per tablet; Take 1 tablet by mouth every 8 hours as needed for Pain for up to 30 days. Intended supply: 30 days  Dispense: 90 tablet; Refill: 0    6. High risk medication use    - HYDROcodone-acetaminophen (NORCO) 7.5-325 MG per tablet; Take 1 tablet by mouth every 8 hours as needed for Pain for up to 30 days. Intended supply: 30 days  Dispense: 90 tablet; Refill: 0    Filled for one month. Discussed short term basis. Side effects, adverse effects of the medication prescribed today, as well as treatment plan/ rationale and result expectations have been discussed with the patient who expresses understanding and desires to proceed. Close follow up to evaluate treatment results and for coordination of care. I have reviewed the patient's medical history in detail and updated the computerized patient record. As always, patient is advised that if symptoms worsen in any way they will proceed to the nearest emergency room. FU 4 mos. An  electronic signature was used to authenticate this note. --RINA Pappas - CNP on 11/30/2020 at 12:51 PM        Pursuant to the emergency declaration under the [facility], [facility] waiver authority and the [employer] Act, this Virtual  Visit was conducted, with patient's consent, to reduce the patient's risk of exposure to COVID-19 and provide continuity of care for an established patient. Services were provided through a video synchronous discussion virtually to substitute for in-person clinic visit.

## 2020-12-15 ENCOUNTER — VIRTUAL VISIT (OUTPATIENT)
Dept: BEHAVIORAL/MENTAL HEALTH CLINIC | Age: 60
End: 2020-12-15
Payer: MEDICARE

## 2020-12-15 PROCEDURE — 99214 OFFICE O/P EST MOD 30 MIN: CPT | Performed by: PSYCHIATRY & NEUROLOGY

## 2020-12-15 RX ORDER — ESCITALOPRAM OXALATE 5 MG/1
5 TABLET ORAL DAILY
Qty: 30 TABLET | Refills: 3 | Status: SHIPPED | OUTPATIENT
Start: 2020-12-15 | End: 2021-04-07

## 2020-12-15 NOTE — PROGRESS NOTES
 PTSD (post-traumatic stress disorder)    Major depressive disorder, recurrent episode, moderate (HCC)    Opioid dependence with opioid-induced disorder ([facility] [address]           DATE and changes made     · Previous medications   ? lexapro, abilify, effexor, wellbutrin previously, now on Cymbalta 60 mg   · 7/2/20   ? cymbalta was tapered and stopped. Was going to start wellbutrin  · 7/17/20 (between appts)  ? Patient actually started the wellbutrin on her own  · 7/22/20  ? No changes as patient has only been on wellbutrin a few days so far  · 8/10/20  ? Currently on 75 mg, Amitriptylline 100 mg QHS (after current taper has completed)  · 8/13/20  ? Pt is still on elavil 75 mg QHS, will increase to 100 mg in several more days and is doing well, improved depression and GI symptoms on this   · 9/2/20  ? No side effects from elavil and doing well at 100 mg QHS   ? Propranolol 10 mg TID PRN anxiety has been helful and is using as needed   · 10/7  ? Rec. Decreasing topamax as this may be worsening daytime sedation   ? Patient will take 100 mg QHS   ? Then continue amitryptylline   · 11/24  ? Patient having worsening mood, has not been on her pain medications 2/2 discharged from practice as she had been getting medical mj from the dispensary   ? Patient will take amitriptylline 100 mg INCREASE to 125 mg QHS for one week, then to 150 mg QHS after that   · 12/15  ? Patient taking 150 mg QHS of elavil   ? Helps with sleep, irritable bowel, less diarrhea, less pain, but not helping with motivation  ? Elavil 100 mg QHS for sleep primarily (a decrease)  ? lexapro 5 mg QD     AT TODAY'S VISIT     1. No Labs ordered today   2. Crisis plan reviewed and patient verbally contracts for safety. Go to ED with emergent symptoms or safety concerns. 3. Risks, benefits, side effects of medications, including any / all black box warnings, discussed with patient, who verbalizes their understanding. Pt is shashi for safety and denies thoughts of SI/HI. Amenable to plan. No acute concerns to address regarding medications. Subjective:      Pt reports stressors due to COVID, not being able to see family   Reports frustraton with isolation from family   Feels she would like to be able to return to engaging in things like [employer] shopping   Family members who are frontline hospital workers   Patient reports they have been compliant with current medication regimen and have not missed a dose. Patient denies medication side effects. At today's visit, patient denies thoughts of harm to self or others since last appointment, and denies auditory or visual hallucinations. At today's visit, pt reports that since our last visit, their average MOOD has been (on a scale of 1-10, with 1 being severely depressed and 10 being not depressed at all)  Very depressed [] 1  [] 2  [] 3  [] 4  [x] 5  [] 6  [] 7  [] 8  [] 9  [] 10  Not depressed    At today's visit, pt reports that since our last visit, their average ANXIETY has been (on a scale of 1-10, with 10 being severely anxious and 1 being not anxious at all)  Not anxious [] 1     [] 2     [] 3     [] 4   [] 5    [] 6      [x] 7   [] 8   [] 9       [] 10  Very anxious       At today's visit, pt reports that since our last visit, their average APPETITE has been    [] Decreased     [x] Normal/Unchanged   [] Increased      At today's visit, pt reports that since our last visit, their average HOURS OF SLEEP (every 24 hours) has been    [] 0-3   [] 4-5    [] 5-6    [] 6-7    [x] 8-9    [] 10-11   [] 11+     At today's visit, pt reports that since our last visit, their average Energy has been     [] Poor    [x] Average  [] Increased         Aggression:  [] yes  [x] no    Patient is [x] Able to contract for safety  [] unable to CONTRACT FOR SAFETY     ROS:  [x] All negative/unchanged except if checked.  Explain positive(checked items) below:     [] Constitutional Behavior/Motor:  no abnormalities noted  Attitude toward examiner:  attentive and good eye contact  Speech:  spontaneous, normal rate and normal volume   Mood: \"a little better\"  Affect:  mood congruent  Thought processes:  linear and logical  Thought content:  Denies suicidal or homicidal ideation, denies auditory or visual hallucinations  Cognition:  no deficits in attention, concentration notable, recent memory grossly intact  Concentration intact  Memory intact  Insight good   Judgement fair   Fund of Knowledge adequate    Treatment Plan:  Reviewed current Medications with the patient. Education provided on the compliance with treatment. Reviewed OARRs, no concerns identified     The anticipated benefits and side effects of the medications, including the anticipated results of not receiving the medication, and of alternatives to the medications were explained to the patient and their informed consent was obtained for starting medications as well as adjusting the doses (titration or tapering) as indicated. The above information was given by physician in verbal form and sufficient understanding was in evidence. The patient participated in discussion of the information and question and/or concerns were addressed before the medication was given. PSYCHOTHERAPY/COUNSELING:  Encourage patient to attend outpatient appointments and therapy. [x] Therapeutic interview  [] Supportive  [x] CBT  [x] Ongoing  [] Other    No follow-ups on file. Follow-up in 2 weeks, patient informed to call for follow-up    Please note this report has been partially produced using speech recognition software  And may cause contain errors related to that system including grammar, punctuation and spelling as well as words and phrases that may seem inappropriate. If there are questions or concerns please feel free to contact me to clarify.     Steven Bravo MD Electronically signed by Samaria Muniz MD on 12/15/2020 at 11:09 AM  12/15/2020 11:09 AM    Psychiatry   Due to this being a TeleHealth encounter, evaluation of the following organ systems is limited: Vitals/Constitutional/EENT/Resp/CV/GI//MS/Neuro/Skin/Heme-Lymph-Imm. An  electronic signature was used to authenticate this note. --Samaria Muniz MD on 12/15/2020 at 11:09 AM    Pursuant to the emergency declaration under the [facility], [facility] waiver authority and the [employer] Act, this Virtual  Visit was conducted, with patient's consent, to reduce the patient's risk of exposure to COVID-19 and provide continuity of care for an established patient Services were provided through a video synchronous discussion virtually to substitute for in-person clinic visit.

## 2021-01-11 RX ORDER — AMITRIPTYLINE HYDROCHLORIDE 50 MG/1
TABLET, FILM COATED ORAL
Qty: 87 TABLET | Refills: 0 | OUTPATIENT
Start: 2021-01-11

## 2021-01-11 NOTE — TELEPHONE ENCOUNTER
REFILL REQUEST UPDATE FROM PHYSICIAN    Requested Prescriptions     Pending Prescriptions Disp Refills    amitriptyline (ELAVIL) 50 MG tablet [Pharmacy Med Name: Amitriptyline HCl 50 MG Oral Tablet] 87 tablet 0     Sig: TAKE 2 & 1/2 (TWO & ONE-HALF) TABLETS BY MOUTH NIGHTLY FOR 7 DAYS AND THEN 3 NIGHTLY FOR 23 DAYS       The following medications were refilled per patient request to the pharmacy identified as patient's preference pharmacy. Prior to refill, the [facility] has been reviewed as needed for controlled substance monitoring and no concerns were identified. No orders of the defined types were placed in this encounter. Please inform the patient of any pending lab work, so they may complete this prior to the next medication fill.      Remedios Riddle MD

## 2021-01-13 DIAGNOSIS — D72.829 LEUKOCYTOSIS, UNSPECIFIED TYPE: ICD-10-CM

## 2021-01-13 DIAGNOSIS — E78.5 HYPERLIPIDEMIA, UNSPECIFIED HYPERLIPIDEMIA TYPE: Primary | ICD-10-CM

## 2021-01-21 DIAGNOSIS — D72.829 LEUKOCYTOSIS, UNSPECIFIED TYPE: ICD-10-CM

## 2021-01-21 DIAGNOSIS — E78.5 HYPERLIPIDEMIA, UNSPECIFIED HYPERLIPIDEMIA TYPE: ICD-10-CM

## 2021-01-21 LAB
BASOPHILS ABSOLUTE: 0.1 K/UL (ref 0–0.2)
BASOPHILS RELATIVE PERCENT: 0.6 %
CHOLESTEROL, TOTAL: 230 MG/DL (ref 0–199)
EOSINOPHILS ABSOLUTE: 0.2 K/UL (ref 0–0.7)
EOSINOPHILS RELATIVE PERCENT: 1.8 %
HCT VFR BLD CALC: 46.2 % (ref 37–47)
HDLC SERPL-MCNC: 46 MG/DL (ref 40–59)
HEMOGLOBIN: 15.4 G/DL (ref 12–16)
LDL CHOLESTEROL CALCULATED: 137 MG/DL (ref 0–129)
LYMPHOCYTES ABSOLUTE: 3.5 K/UL (ref 1–4.8)
LYMPHOCYTES RELATIVE PERCENT: 28.5 %
MCH RBC QN AUTO: 31.6 PG (ref 27–31.3)
MCHC RBC AUTO-ENTMCNC: 33.3 % (ref 33–37)
MCV RBC AUTO: 94.9 FL (ref 82–100)
MONOCYTES ABSOLUTE: 0.5 K/UL (ref 0.2–0.8)
MONOCYTES RELATIVE PERCENT: 4.4 %
NEUTROPHILS ABSOLUTE: 7.8 K/UL (ref 1.4–6.5)
NEUTROPHILS RELATIVE PERCENT: 64.7 %
PDW BLD-RTO: 13.9 % (ref 11.5–14.5)
PLATELET # BLD: 318 K/UL (ref 130–400)
RBC # BLD: 4.87 M/UL (ref 4.2–5.4)
TRIGL SERPL-MCNC: 237 MG/DL (ref 0–150)
WBC # BLD: 12.1 K/UL (ref 4.8–10.8)

## 2021-01-26 ENCOUNTER — VIRTUAL VISIT (OUTPATIENT)
Dept: BEHAVIORAL/MENTAL HEALTH CLINIC | Age: 61
End: 2021-01-26
Payer: MEDICARE

## 2021-01-26 DIAGNOSIS — F33.1 MODERATE EPISODE OF RECURRENT MAJOR DEPRESSIVE DISORDER (HCC): ICD-10-CM

## 2021-01-26 DIAGNOSIS — F41.1 GAD (GENERALIZED ANXIETY DISORDER): Primary | ICD-10-CM

## 2021-01-26 DIAGNOSIS — F43.10 PTSD (POST-TRAUMATIC STRESS DISORDER): ICD-10-CM

## 2021-01-26 PROCEDURE — 99214 OFFICE O/P EST MOD 30 MIN: CPT | Performed by: PSYCHIATRY & NEUROLOGY

## 2021-01-26 RX ORDER — AMITRIPTYLINE HYDROCHLORIDE 100 MG/1
100 TABLET, FILM COATED ORAL NIGHTLY
Qty: 30 TABLET | Refills: 3 | Status: SHIPPED | OUTPATIENT
Start: 2021-01-26 | End: 2021-05-05 | Stop reason: SDUPTHER
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
## 2021-02-15 ENCOUNTER — VIRTUAL VISIT (OUTPATIENT)
Dept: BEHAVIORAL/MENTAL HEALTH CLINIC | Age: 61
End: 2021-02-15
Payer: MEDICARE

## 2021-02-15 DIAGNOSIS — F41.1 GAD (GENERALIZED ANXIETY DISORDER): ICD-10-CM

## 2021-02-15 DIAGNOSIS — F33.1 MAJOR DEPRESSIVE DISORDER, RECURRENT EPISODE, MODERATE (HCC): Primary | ICD-10-CM

## 2021-02-15 DIAGNOSIS — F43.10 PTSD (POST-TRAUMATIC STRESS DISORDER): ICD-10-CM

## 2021-02-15 PROCEDURE — 99214 OFFICE O/P EST MOD 30 MIN: CPT | Performed by: PSYCHIATRY & NEUROLOGY

## 2021-02-15 RX ORDER — LIOTHYRONINE SODIUM 25 UG/1
25 TABLET ORAL DAILY
Qty: 30 TABLET | Refills: 3 | Status: SHIPPED | OUTPATIENT
Start: 2021-02-15 | End: 2021-03-03
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
## 2021-03-03 ENCOUNTER — PATIENT MESSAGE (OUTPATIENT)
Dept: FAMILY MEDICINE CLINIC | Age: 61
End: 2021-03-03

## 2021-03-03 ENCOUNTER — VIRTUAL VISIT (OUTPATIENT)
Dept: BEHAVIORAL/MENTAL HEALTH CLINIC | Age: 61
End: 2021-03-03
Payer: MEDICARE

## 2021-03-03 DIAGNOSIS — F32.A ANXIETY AND DEPRESSION: ICD-10-CM

## 2021-03-03 DIAGNOSIS — F33.1 MAJOR DEPRESSIVE DISORDER, RECURRENT EPISODE, MODERATE (HCC): Primary | ICD-10-CM

## 2021-03-03 DIAGNOSIS — M54.40 BILATERAL LOW BACK PAIN WITH SCIATICA, SCIATICA LATERALITY UNSPECIFIED, UNSPECIFIED CHRONICITY: ICD-10-CM

## 2021-03-03 DIAGNOSIS — Z79.899 HIGH RISK MEDICATION USE: ICD-10-CM

## 2021-03-03 DIAGNOSIS — E78.5 HYPERLIPIDEMIA, UNSPECIFIED HYPERLIPIDEMIA TYPE: ICD-10-CM

## 2021-03-03 DIAGNOSIS — F41.9 ANXIETY AND DEPRESSION: ICD-10-CM

## 2021-03-03 DIAGNOSIS — G89.4 CHRONIC PAIN DISORDER: ICD-10-CM

## 2021-03-03 DIAGNOSIS — M47.12 CERVICAL SPONDYLOSIS WITH MYELOPATHY: ICD-10-CM

## 2021-03-03 DIAGNOSIS — M54.17 LUMBOSACRAL RADICULOPATHY DUE TO TRAUMA: ICD-10-CM

## 2021-03-03 DIAGNOSIS — F41.1 GAD (GENERALIZED ANXIETY DISORDER): ICD-10-CM

## 2021-03-03 PROCEDURE — 99214 OFFICE O/P EST MOD 30 MIN: CPT | Performed by: PSYCHIATRY & NEUROLOGY

## 2021-03-03 RX ORDER — LIOTHYRONINE SODIUM 50 UG/1
50 TABLET ORAL DAILY
Qty: 30 TABLET | Refills: 3 | Status: SHIPPED | OUTPATIENT
Start: 2021-03-03 | End: 2021-04-07 | Stop reason: SDUPTHER

## 2021-03-03 NOTE — TELEPHONE ENCOUNTER
From: Alex Husain  To: RINA Rodas - EDUARDO  Sent: 3/3/2021 4:22 PM EST  Subject: Non-Urgent Medical Question    Hi Saranya Smoker. Hope all is well. I will be needing an RX for my Lipitor if you want me to continue on it as there are no refills. If so, please do a 90 day supply which is more cost effective for me. I also only have enough of my Leonid Sinner to last through Thursday. I would appreciate another RX for that as well. As before, my RX's go to [address] [facility]. in [facility]. Thank you. Take care.  Dora Guajardo
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
## 2021-03-04 RX ORDER — HYDROCODONE BITARTRATE AND ACETAMINOPHEN 7.5; 325 MG/1; MG/1
1 TABLET ORAL EVERY 8 HOURS PRN
Qty: 90 TABLET | Refills: 0 | Status: SHIPPED | OUTPATIENT
Start: 2021-03-04 | End: 2021-04-19 | Stop reason: SDUPTHER

## 2021-03-04 RX ORDER — ATORVASTATIN CALCIUM 10 MG/1
10 TABLET, FILM COATED ORAL DAILY
Qty: 90 TABLET | Refills: 1 | Status: SHIPPED | OUTPATIENT
Start: 2021-03-04 | End: 2021-09-30 | Stop reason: SDUPTHER

## 2021-04-07 ENCOUNTER — VIRTUAL VISIT (OUTPATIENT)
Dept: BEHAVIORAL/MENTAL HEALTH CLINIC | Age: 61
End: 2021-04-07
Payer: MEDICARE

## 2021-04-07 DIAGNOSIS — K90.89 OTHER INTESTINAL MALABSORPTION: ICD-10-CM

## 2021-04-07 DIAGNOSIS — F41.1 GAD (GENERALIZED ANXIETY DISORDER): ICD-10-CM

## 2021-04-07 DIAGNOSIS — F43.10 PTSD (POST-TRAUMATIC STRESS DISORDER): ICD-10-CM

## 2021-04-07 DIAGNOSIS — Z79.899 OTHER LONG TERM (CURRENT) DRUG THERAPY: ICD-10-CM

## 2021-04-07 DIAGNOSIS — F33.1 MODERATE EPISODE OF RECURRENT MAJOR DEPRESSIVE DISORDER (HCC): Primary | ICD-10-CM

## 2021-04-07 PROCEDURE — 99214 OFFICE O/P EST MOD 30 MIN: CPT | Performed by: PSYCHIATRY & NEUROLOGY

## 2021-04-07 RX ORDER — LIOTHYRONINE SODIUM 25 UG/1
25 TABLET ORAL DAILY
Qty: 30 TABLET | Refills: 3 | Status: SHIPPED | OUTPATIENT
Start: 2021-04-07 | End: 2021-05-20 | Stop reason: SDUPTHER
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
## 2021-04-16 ENCOUNTER — PATIENT MESSAGE (OUTPATIENT)
Dept: FAMILY MEDICINE CLINIC | Age: 61
End: 2021-04-16

## 2021-04-16 DIAGNOSIS — M47.12 CERVICAL SPONDYLOSIS WITH MYELOPATHY: ICD-10-CM

## 2021-04-16 DIAGNOSIS — G89.4 CHRONIC PAIN DISORDER: ICD-10-CM

## 2021-04-16 DIAGNOSIS — Z79.899 HIGH RISK MEDICATION USE: ICD-10-CM

## 2021-04-16 DIAGNOSIS — M54.17 LUMBOSACRAL RADICULOPATHY DUE TO TRAUMA: ICD-10-CM

## 2021-04-16 DIAGNOSIS — M54.40 BILATERAL LOW BACK PAIN WITH SCIATICA, SCIATICA LATERALITY UNSPECIFIED, UNSPECIFIED CHRONICITY: ICD-10-CM

## 2021-04-16 DIAGNOSIS — F32.A ANXIETY AND DEPRESSION: ICD-10-CM

## 2021-04-16 DIAGNOSIS — F41.9 ANXIETY AND DEPRESSION: ICD-10-CM

## 2021-04-19 RX ORDER — HYDROCODONE BITARTRATE AND ACETAMINOPHEN 7.5; 325 MG/1; MG/1
1 TABLET ORAL EVERY 8 HOURS PRN
Qty: 90 TABLET | Refills: 0 | Status: SHIPPED | OUTPATIENT
Start: 2021-04-19 | End: 2021-05-19

## 2021-04-21 ENCOUNTER — OFFICE VISIT (OUTPATIENT)
Dept: FAMILY MEDICINE CLINIC | Age: 61
End: 2021-04-21
Payer: MEDICARE

## 2021-04-21 VITALS
DIASTOLIC BLOOD PRESSURE: 74 MMHG | HEIGHT: 63 IN | BODY MASS INDEX: 23.67 KG/M2 | HEART RATE: 118 BPM | SYSTOLIC BLOOD PRESSURE: 132 MMHG | WEIGHT: 133.6 LBS | TEMPERATURE: 98.7 F | OXYGEN SATURATION: 99 %

## 2021-04-21 DIAGNOSIS — Z79.899 HIGH RISK MEDICATION USE: ICD-10-CM

## 2021-04-21 DIAGNOSIS — R06.00 DYSPNEA, UNSPECIFIED TYPE: ICD-10-CM

## 2021-04-21 DIAGNOSIS — R53.83 FATIGUE, UNSPECIFIED TYPE: ICD-10-CM

## 2021-04-21 DIAGNOSIS — R00.0 TACHYCARDIA: Primary | ICD-10-CM

## 2021-04-21 DIAGNOSIS — R94.31 ABNORMAL EKG: ICD-10-CM

## 2021-04-21 LAB
ALBUMIN SERPL-MCNC: 4.3 G/DL (ref 3.5–4.6)
ALP BLD-CCNC: 122 U/L (ref 40–130)
ALT SERPL-CCNC: 40 U/L (ref 0–33)
ANION GAP SERPL CALCULATED.3IONS-SCNC: 13 MEQ/L (ref 9–15)
AST SERPL-CCNC: 40 U/L (ref 0–35)
BASOPHILS ABSOLUTE: 0.1 K/UL (ref 0–0.2)
BASOPHILS RELATIVE PERCENT: 0.6 %
BILIRUB SERPL-MCNC: 0.4 MG/DL (ref 0.2–0.7)
BILIRUBIN DIRECT: <0.2 MG/DL (ref 0–0.4)
BILIRUBIN, INDIRECT: NORMAL MG/DL (ref 0–0.6)
BUN BLDV-MCNC: 8 MG/DL (ref 8–23)
CALCIUM SERPL-MCNC: 9.8 MG/DL (ref 8.5–9.9)
CHLORIDE BLD-SCNC: 101 MEQ/L (ref 95–107)
CO2: 21 MEQ/L (ref 20–31)
CREAT SERPL-MCNC: 0.61 MG/DL (ref 0.5–0.9)
EOSINOPHILS ABSOLUTE: 0.2 K/UL (ref 0–0.7)
EOSINOPHILS RELATIVE PERCENT: 1.9 %
FOLATE: >20 NG/ML (ref 7.3–26.1)
GFR AFRICAN AMERICAN: >60
GFR NON-AFRICAN AMERICAN: >60
GLOBULIN: 2.7 G/DL (ref 2.3–3.5)
GLUCOSE BLD-MCNC: 90 MG/DL (ref 70–99)
HBA1C MFR BLD: 5.6 % (ref 4.8–5.9)
HCT VFR BLD CALC: 45.8 % (ref 37–47)
HEMOGLOBIN: 15.4 G/DL (ref 12–16)
LYMPHOCYTES ABSOLUTE: 5 K/UL (ref 1–4.8)
LYMPHOCYTES RELATIVE PERCENT: 40 %
MCH RBC QN AUTO: 30.6 PG (ref 27–31.3)
MCHC RBC AUTO-ENTMCNC: 33.7 % (ref 33–37)
MCV RBC AUTO: 90.7 FL (ref 82–100)
MONOCYTES ABSOLUTE: 0.6 K/UL (ref 0.2–0.8)
MONOCYTES RELATIVE PERCENT: 4.5 %
NEUTROPHILS ABSOLUTE: 6.6 K/UL (ref 1.4–6.5)
NEUTROPHILS RELATIVE PERCENT: 53 %
PDW BLD-RTO: 13 % (ref 11.5–14.5)
PLATELET # BLD: 294 K/UL (ref 130–400)
POTASSIUM SERPL-SCNC: 4.1 MEQ/L (ref 3.4–4.9)
RBC # BLD: 5.05 M/UL (ref 4.2–5.4)
SODIUM BLD-SCNC: 135 MEQ/L (ref 135–144)
T4 FREE: 0.53 NG/DL (ref 0.84–1.68)
TOTAL PROTEIN: 7 G/DL (ref 6.3–8)
TSH SERPL DL<=0.05 MIU/L-ACNC: 0.01 UIU/ML (ref 0.44–3.86)
VITAMIN B-12: 1102 PG/ML (ref 232–1245)
VITAMIN D 25-HYDROXY: 32.1 NG/ML (ref 30–100)
WBC # BLD: 12.5 K/UL (ref 4.8–10.8)

## 2021-04-21 PROCEDURE — 99214 OFFICE O/P EST MOD 30 MIN: CPT | Performed by: NURSE PRACTITIONER

## 2021-04-21 PROCEDURE — 93000 ELECTROCARDIOGRAM COMPLETE: CPT | Performed by: NURSE PRACTITIONER

## 2021-04-21 ASSESSMENT — ENCOUNTER SYMPTOMS
CHEST TIGHTNESS: 0
SHORTNESS OF BREATH: 0
NAUSEA: 0
VOMITING: 0
EYES NEGATIVE: 1
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
## 2021-04-23 LAB
ALCOHOL URINE: NEGATIVE MG/DL
AMPHETAMINE SCREEN, URINE: NEGATIVE NG/ML
BARBITURATE SCREEN URINE: NEGATIVE NG/ML
BENZODIAZEPINE SCREEN, URINE: NEGATIVE NG/ML
CANNABINOID SCREEN URINE: POSITIVE NG/ML
COCAINE METABOLITE SCREEN URINE: NEGATIVE NG/ML
CREATININE URINE: <20 MG/DL (ref 20–400)
Lab: ABNORMAL
MDMA URINE: NEGATIVE NG/ML
METHADONE SCREEN, URINE: NEGATIVE NG/ML
OPIATE SCREEN URINE: POSITIVE NG/ML
OXYCODONE SCREEN URINE: NEGATIVE NG/ML
PHENCYCLIDINE SCREEN URINE: NEGATIVE NG/ML
PROPOXYPHENE SCREEN: NEGATIVE NG/ML

## 2021-04-26 DIAGNOSIS — M54.12 BRACHIAL NEURITIS OR RADICULITIS: ICD-10-CM

## 2021-04-27 RX ORDER — TOPIRAMATE 100 MG/1
100 TABLET, FILM COATED ORAL 2 TIMES DAILY
Qty: 180 TABLET | Refills: 0 | Status: SHIPPED | OUTPATIENT
Start: 2021-04-27 | End: 2021-05-05 | Stop reason: SDUPTHER

## 2021-04-28 ENCOUNTER — TELEPHONE (OUTPATIENT)
Dept: BEHAVIORAL/MENTAL HEALTH CLINIC | Age: 61
End: 2021-04-28

## 2021-04-28 NOTE — TELEPHONE ENCOUNTER
Spoke with about lab results, per Dr. Sri Medina the lab is non-concerning , it is likely that she will start to taper off at next OV. Patient gave good understanding and no further questions.

## 2021-05-05 ENCOUNTER — VIRTUAL VISIT (OUTPATIENT)
Dept: BEHAVIORAL/MENTAL HEALTH CLINIC | Age: 61
End: 2021-05-05
Payer: MEDICARE

## 2021-05-05 DIAGNOSIS — F33.1 MAJOR DEPRESSIVE DISORDER, RECURRENT EPISODE, MODERATE (HCC): Primary | ICD-10-CM

## 2021-05-05 DIAGNOSIS — F41.1 GAD (GENERALIZED ANXIETY DISORDER): ICD-10-CM

## 2021-05-05 DIAGNOSIS — M54.12 BRACHIAL NEURITIS OR RADICULITIS: ICD-10-CM

## 2021-05-05 PROCEDURE — 99215 OFFICE O/P EST HI 40 MIN: CPT | Performed by: PSYCHIATRY & NEUROLOGY

## 2021-05-05 RX ORDER — LIOTHYRONINE SODIUM 25 UG/1
TABLET ORAL
Qty: 18 TABLET | Refills: 0 | Status: SHIPPED | OUTPATIENT
Start: 2021-05-05 | End: 2021-06-21

## 2021-05-05 RX ORDER — ONDANSETRON 4 MG/1
4 TABLET, FILM COATED ORAL DAILY PRN
Qty: 30 TABLET | Refills: 0 | Status: SHIPPED | OUTPATIENT
Start: 2021-05-05 | End: 2021-05-24 | Stop reason: ALTCHOICE

## 2021-05-05 RX ORDER — AMITRIPTYLINE HYDROCHLORIDE 100 MG/1
100 TABLET, FILM COATED ORAL NIGHTLY
Qty: 30 TABLET | Refills: 3 | Status: SHIPPED | OUTPATIENT
Start: 2021-05-05 | End: 2021-08-04 | Stop reason: SDUPTHER

## 2021-05-05 RX ORDER — TOPIRAMATE 100 MG/1
100 TABLET, FILM COATED ORAL 2 TIMES DAILY
Qty: 180 TABLET | Refills: 2 | Status: SHIPPED | OUTPATIENT
Start: 2021-05-05 | End: 2021-09-03 | Stop reason: SDUPTHER
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
## 2021-05-20 ENCOUNTER — OFFICE VISIT (OUTPATIENT)
Dept: CARDIOLOGY CLINIC | Age: 61
End: 2021-05-20
Payer: MEDICARE

## 2021-05-20 VITALS
SYSTOLIC BLOOD PRESSURE: 150 MMHG | HEIGHT: 63 IN | RESPIRATION RATE: 16 BRPM | DIASTOLIC BLOOD PRESSURE: 89 MMHG | WEIGHT: 132 LBS | BODY MASS INDEX: 23.39 KG/M2 | HEART RATE: 107 BPM | OXYGEN SATURATION: 98 %

## 2021-05-20 DIAGNOSIS — R00.0 TACHYCARDIA: ICD-10-CM

## 2021-05-20 DIAGNOSIS — I10 ESSENTIAL HYPERTENSION: ICD-10-CM

## 2021-05-20 DIAGNOSIS — R42 DIZZY: ICD-10-CM

## 2021-05-20 DIAGNOSIS — R06.02 SHORTNESS OF BREATH: Primary | ICD-10-CM

## 2021-05-20 DIAGNOSIS — R94.31 ABNORMAL EKG: ICD-10-CM

## 2021-05-20 DIAGNOSIS — F17.200 SMOKER: ICD-10-CM

## 2021-05-20 PROCEDURE — 99204 OFFICE O/P NEW MOD 45 MIN: CPT | Performed by: INTERNAL MEDICINE

## 2021-05-20 RX ORDER — ASPIRIN 81 MG/1
81 TABLET ORAL DAILY
Qty: 90 TABLET | Refills: 1 | Status: ON HOLD | OUTPATIENT
Start: 2021-05-20 | End: 2021-10-04

## 2021-05-20 RX ORDER — HYDROCODONE BITARTRATE AND ACETAMINOPHEN 5; 325 MG/1; MG/1
1 TABLET ORAL 2 TIMES DAILY PRN
COMMUNITY
End: 2021-05-24

## 2021-05-20 RX ORDER — CARVEDILOL 12.5 MG/1
12.5 TABLET ORAL 2 TIMES DAILY
Qty: 60 TABLET | Refills: 3 | Status: SHIPPED | OUTPATIENT
Start: 2021-05-20 | End: 2021-09-27 | Stop reason: SDUPTHER

## 2021-05-20 ASSESSMENT — ENCOUNTER SYMPTOMS
CHEST TIGHTNESS: 1
NAUSEA: 0
EYES NEGATIVE: 1
WHEEZING: 0
SHORTNESS OF BREATH: 1
STRIDOR: 0
BLOOD IN STOOL: 0
GASTROINTESTINAL NEGATIVE: 1
COUGH: 0
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
## 2021-05-24 ENCOUNTER — OFFICE VISIT (OUTPATIENT)
Dept: FAMILY MEDICINE CLINIC | Age: 61
End: 2021-05-24
Payer: MEDICARE

## 2021-05-24 VITALS
WEIGHT: 134.5 LBS | DIASTOLIC BLOOD PRESSURE: 78 MMHG | OXYGEN SATURATION: 98 % | BODY MASS INDEX: 23.83 KG/M2 | TEMPERATURE: 98.2 F | HEART RATE: 68 BPM | HEIGHT: 63 IN | SYSTOLIC BLOOD PRESSURE: 124 MMHG

## 2021-05-24 DIAGNOSIS — Z00.00 ROUTINE GENERAL MEDICAL EXAMINATION AT A HEALTH CARE FACILITY: Primary | ICD-10-CM

## 2021-05-24 DIAGNOSIS — F32.A ANXIETY AND DEPRESSION: ICD-10-CM

## 2021-05-24 DIAGNOSIS — F41.9 ANXIETY AND DEPRESSION: ICD-10-CM

## 2021-05-24 DIAGNOSIS — Z79.899 HIGH RISK MEDICATION USE: ICD-10-CM

## 2021-05-24 DIAGNOSIS — M54.17 LUMBOSACRAL RADICULOPATHY DUE TO TRAUMA: ICD-10-CM

## 2021-05-24 DIAGNOSIS — Z13.31 POSITIVE DEPRESSION SCREENING: ICD-10-CM

## 2021-05-24 DIAGNOSIS — M47.12 CERVICAL SPONDYLOSIS WITH MYELOPATHY: ICD-10-CM

## 2021-05-24 DIAGNOSIS — G89.4 CHRONIC PAIN DISORDER: ICD-10-CM

## 2021-05-24 DIAGNOSIS — M54.40 BILATERAL LOW BACK PAIN WITH SCIATICA, SCIATICA LATERALITY UNSPECIFIED, UNSPECIFIED CHRONICITY: ICD-10-CM

## 2021-05-24 PROCEDURE — G0439 PPPS, SUBSEQ VISIT: HCPCS | Performed by: NURSE PRACTITIONER

## 2021-05-24 PROCEDURE — G8431 POS CLIN DEPRES SCRN F/U DOC: HCPCS | Performed by: NURSE PRACTITIONER

## 2021-05-24 PROCEDURE — 99213 OFFICE O/P EST LOW 20 MIN: CPT | Performed by: NURSE PRACTITIONER

## 2021-05-24 RX ORDER — HYDROCODONE BITARTRATE AND ACETAMINOPHEN 5; 325 MG/1; MG/1
1 TABLET ORAL 2 TIMES DAILY PRN
Status: CANCELLED | OUTPATIENT
Start: 2021-05-24

## 2021-05-24 RX ORDER — HYDROCODONE BITARTRATE AND ACETAMINOPHEN 7.5; 325 MG/1; MG/1
1 TABLET ORAL EVERY 8 HOURS PRN
Qty: 90 TABLET | Refills: 0 | Status: SHIPPED | OUTPATIENT
Start: 2021-05-24 | End: 2021-07-14 | Stop reason: SDUPTHER

## 2021-05-24 ASSESSMENT — PATIENT HEALTH QUESTIONNAIRE - PHQ9
1. LITTLE INTEREST OR PLEASURE IN DOING THINGS: 2
SUM OF ALL RESPONSES TO PHQ QUESTIONS 1-9: 5
SUM OF ALL RESPONSES TO PHQ QUESTIONS 1-9: 5
9. THOUGHTS THAT YOU WOULD BE BETTER OFF DEAD, OR OF HURTING YOURSELF: 0
4. FEELING TIRED OR HAVING LITTLE ENERGY: 1
SUM OF ALL RESPONSES TO PHQ QUESTIONS 1-9: 5
3. TROUBLE FALLING OR STAYING ASLEEP: 0

## 2021-05-24 ASSESSMENT — LIFESTYLE VARIABLES: HOW OFTEN DO YOU HAVE A DRINK CONTAINING ALCOHOL: 0

## 2021-05-24 NOTE — PATIENT INSTRUCTIONS
Personalized Preventive Plan for Yakov Bejarano - 5/24/2021  Medicare offers a range of preventive health benefits. Some of the tests and screenings are paid in full while other may be subject to a deductible, co-insurance, and/or copay. Some of these benefits include a comprehensive review of your medical history including lifestyle, illnesses that may run in your family, and various assessments and screenings as appropriate. After reviewing your medical record and screening and assessments performed today your provider may have ordered immunizations, labs, imaging, and/or referrals for you. A list of these orders (if applicable) as well as your Preventive Care list are included within your After Visit Summary for your review. Other Preventive Recommendations:    · A preventive eye exam performed by an eye specialist is recommended every 1-2 years to screen for glaucoma; cataracts, macular degeneration, and other eye disorders. · A preventive dental visit is recommended every 6 months. · Try to get at least 150 minutes of exercise per week or 10,000 steps per day on a pedometer . · Order or download the FREE \"Exercise & Physical Activity: Your Everyday Guide\" from [employer]. Call 1-397.501.9956 or search [employer] online. · You need 9020-9926 mg of calcium and 4852-3028 IU of vitamin D per day. It is possible to meet your calcium requirement with diet alone, but a vitamin D supplement is usually necessary to meet this goal.  · When exposed to the sun, use a sunscreen that protects against both UVA and UVB radiation with an SPF of 30 or greater. Reapply every 2 to 3 hours or after sweating, drying off with a towel, or swimming. · Always wear a seat belt when traveling in a car. Always wear a helmet when riding a bicycle or motorcycle.

## 2021-05-24 NOTE — PROGRESS NOTES
mouth nightly Yes Caroline King MD   topiramate (TOPAMAX) 100 MG tablet Take 1 tablet by mouth 2 times daily Yes Caroline King MD   atorvastatin (LIPITOR) 10 MG tablet Take 1 tablet by mouth daily Yes RINA Boss CNP   Multiple Vitamin (MULTI-VITAMIN DAILY PO) Take by mouth Yes Historical Provider, MD   Wheat Dextrin (BENEFIBER) POWD Take 4 g by mouth 3 times daily (with meals) Yes Historical Provider, MD         Past Medical History:   Diagnosis Date    Anxiety     Depression     Hyperlipidemia     Hypopotassemia     Lumbago     Osteopenia     PTSD (post-traumatic stress disorder)     Pulmonary embolism ([address]     4 PE in bilat lungs    Spinal stenosis     congenital       Past Surgical History:   Procedure Laterality Date    HYSTERECTOMY      SPINE SURGERY      cervical x 4, fusions and reconstruction    TONSILLECTOMY      age 3         Family History   Problem Relation Age of Onset    Celiac Disease Neg Hx     Crohn's Disease Neg Hx        CareTeam (Including outside providers/suppliers regularly involved in providing care):   Patient Care Team:  RINA Boss CNP as PCP - General (Family Nurse Practitioner)  RINA Boss CNP as PCP - [facility] Empaneled Provider  Caroline King MD as Consulting Physician (Psychiatry)  Conor Ziegler MD as Cardiologist (Cardiology)    Wt Readings from Last 3 Encounters:   05/24/21 134 lb 8 oz (61 kg)   05/20/21 132 lb (59.9 kg)   04/21/21 133 lb 9.6 oz (60.6 kg)     Vitals:    05/24/21 1549   BP: 124/78   Pulse: 68   Temp: 98.2 °F (36.8 °C)   SpO2: 98%   Weight: 134 lb 8 oz (61 kg)   Height: 5' 3\" (1.6 m)     Body mass index is 23.83 kg/m². Based upon direct observation of the patient, evaluation of cognition reveals recent and remote memory intact.     General Appearance: alert and oriented to person, place and time, well developed and well- nourished, in no acute distress  Skin: warm and dry, no rash or erythema  Head: normocephalic and atraumatic  Eyes: pupils equal, round, and reactive to light, extraocular eye movements intact, conjunctivae normal  ENT: tympanic membrane, external ear and ear canal normal bilaterally, nose without deformity, nasal mucosa and turbinates normal without polyps  Neck: supple and non-tender without mass, no thyromegaly or thyroid nodules, no cervical lymphadenopathy  Pulmonary/Chest: clear to auscultation bilaterally- no wheezes, rales or rhonchi, normal air movement, no respiratory distress  Cardiovascular: normal rate, regular rhythm, normal S1 and S2, no murmurs, rubs, clicks, or gallops, distal pulses intact, no carotid bruits  Abdomen: soft, non-tender, non-distended, normal bowel sounds, no masses or organomegaly  Extremities: no cyanosis, clubbing or edema  Musculoskeletal: normal range of motion, no joint swelling, deformity or tenderness  Neurologic: reflexes normal and symmetric, no cranial nerve deficit, gait, coordination and speech normal    Patient's complete Health Risk Assessment and screening values have been reviewed and are found in Flowsheets. The following problems were reviewed today and where indicated follow up appointments were made and/or referrals ordered.     Positive Risk Factor Screenings with Interventions:       Depression:  PHQ-2 Score: 4  PHQ-9 Total Score: 5    Severity:1-4 = minimal depression, 5-9 = mild depression, 10-14 = moderate depression, 15-19 = moderately severe depression, 20-27 = severe depression  Depression Interventions:  · doing much better with trintellix     Substance History:  Social History     Tobacco History     Smoking Status  Current Every Day Smoker Smoking Frequency  0.5 packs/day for 40 years (20 pk yrs) Smoking Tobacco Type  Cigarettes    Smokeless Tobacco Use  Never Used          Alcohol History     Alcohol Use Status  No          Drug Use     Drug Use Status  Yes Types  Marijuana Comment  Medical Marijuana; CBD drops           Sexual Activity     Sexually Active  Not Asked               Alcohol Screening:       A score of 8 or more is associated with harmful or hazardous drinking. A score of 13 or more in women, and 15 or more in men, is likely to indicate alcohol dependence. Substance Abuse Interventions:  · using drops and edibles    General Health and ACP:  General  In general, how would you say your health is?: Fair  In the past 7 days, have you experienced any of the following?  New or Increased Pain, New or Increased Fatigue, Loneliness, Social Isolation, Stress or Anger?: None of These  Do you get the social and emotional support that you need?: Yes  Do you have a Living Will?: Yes  Advance Directives     Power of [facility] Will ACP-Advance Directive ACP-Power of     Not on File Not on File Not on File Not on File      General Health Risk Interventions:  · No Living Will: Advance Care Planning addressed with patient today    Health Habits/Nutrition:  Health Habits/Nutrition  Do you exercise for at least 20 minutes 2-3 times per week?: (!) No  Have you lost any weight without trying in the past 3 months?: No  Do you eat only one meal per day?: No  Have you seen the dentist within the past year?: Yes  Body mass index: 23.82  Health Habits/Nutrition Interventions:  · Inadequate physical activity:  patient is not ready to increase his/her physical activity level at this time    Hearing/Vision:  No exam data present  Hearing/Vision  Do you or your family notice any trouble with your hearing that hasn't been managed with hearing aids?: No  Do you have difficulty driving, watching TV, or doing any of your daily activities because of your eyesight?: No  Have you had an eye exam within the past year?: (!) No  Hearing/Vision Interventions:  · Vision concerns:  patient encouraged to make appointment with his/her eye specialist    Safety:  Safety  Do you have working smoke detectors?: Yes  Have all throw rugs been removed or fastened?: (!) No  Do you have non-slip mats or surfaces in all bathtubs/showers?: (!) No  Do all of your stairways have a railing or banister?: Yes  Are your doorways, halls and stairs free of clutter?: Yes  Do you always fasten your seatbelt when you are in a car?: Yes  Safety Interventions:  · Home safety tips provided     Personalized Preventive Plan   Current Health Maintenance Status    There is no immunization history on file for this patient. Health Maintenance   Topic Date Due    Pneumococcal 0-64 years Vaccine (1 of 2 - PPSV23) Never done    COVID-19 Vaccine (1) Never done    DTaP/Tdap/Td vaccine (1 - Tdap) Never done    Cervical cancer screen  Never done    Shingles Vaccine (1 of 2) Never done   [employer] Visit (AWV)  Never done    Flu vaccine (Season Ended) 09/01/2021    Lipid screen  01/21/2022    Breast cancer screen  09/16/2022    Colon cancer screen colonoscopy  08/09/2026    Hepatitis C screen  Completed    HIV screen  Completed    Hepatitis A vaccine  Aged Out    Hepatitis B vaccine  Aged Out    Hib vaccine  Aged Out    Meningococcal (ACWY) vaccine  Aged Out     Recommendations for myTips Due: see orders and patient instructions/AVS.  . Recommended screening schedule for the next 5-10 years is provided to the patient in written form: see Patient Instructions/AVS.    Mary Carmenkeny Soulier was seen today for medicare awv, 1 month follow-up, medication refill and depression. Diagnoses and all orders for this visit:    Routine general medical examination at a health care facility    Lumbosacral radiculopathy due to trauma  -     HYDROcodone-acetaminophen (NORCO) 7.5-325 MG per tablet; Take 1 tablet by mouth every 8 hours as needed for Pain for up to 30 days. Intended supply: 30 days    Bilateral low back pain with sciatica, sciatica laterality unspecified, unspecified chronicity  -     HYDROcodone-acetaminophen (NORCO) 7.5-325 MG per tablet;  Take 1 tablet by mouth every 8 hours as no

## 2021-06-02 ENCOUNTER — VIRTUAL VISIT (OUTPATIENT)
Dept: BEHAVIORAL/MENTAL HEALTH CLINIC | Age: 61
End: 2021-06-02
Payer: MEDICARE

## 2021-06-02 DIAGNOSIS — F41.1 GAD (GENERALIZED ANXIETY DISORDER): ICD-10-CM

## 2021-06-02 DIAGNOSIS — F33.1 MAJOR DEPRESSIVE DISORDER, RECURRENT EPISODE, MODERATE (HCC): Primary | ICD-10-CM

## 2021-06-02 PROCEDURE — 99214 OFFICE O/P EST MOD 30 MIN: CPT | Performed by: PSYCHIATRY & NEUROLOGY

## 2021-06-02 NOTE — PROGRESS NOTES
6/2/2021    40 mins total for this encounter = 30 minutes with direct communication with patient for encounter as well as 10 mins spent on chart review, and in the ordering of all necessary labs and/or medications  The risks and benefits of converting to a virtual visit were discussed in light of the current infectious disease epidemic. Patient also understood that insurance coverage and co-pays are up to their individual insurance plans. Patient Location:        Patient's home address  Provider Location ([facility]/State):        Mike [address] [place] [address] (During DXNVX-67 public health emergency)    Psychiatric Diagnoses:  1. Major depressive disorder, recurrent episode, moderate ([facility] [address]    2. DEBI (generalized anxiety disorder)        Assessment/Plan:   Patient denies thoughts of harm to self or others. No acute safety concerns voiced at this appointment. MOOD: IMPROVEMENT: Patient reports improvement in symptoms of low mood, low energy and low motivation. Denies anhedonia. Feels able to attend to activities of daily living. SLEEP: GOOD/IMPROVED: Sleeping better, and reports is soundly through the night. No nightmares, nighttime awakenings. Well rested in the morning. Sleeping 8+ hours a night. ATTENTION AND FOCUS: Patient does report concerns related to attention, focus, and concentration. At this time, will prioritize management of mood symptoms first and foremost, and will address any persistent symptoms related to attention once we have achieved better symptomatic control of mood concerns. ANXIETY: SOME IMPROVEMENT (with medication): Patient reports some improvement in symptoms of anxiety/panic since our last appointment. Able to function in her daily life better, with less feelings of worry, than previously. Anxiety is not as impactful on her activities, but continues to have some intermittent concerns related to anxiety. BIPOLAR TIM: No concerns.  No history of mary ann symptoms    SUBSTANCE USE: No concerns related to substance use. Not applicable. ASSESSMENT/PLAN: Medication changes needed: Patient tolerating current medication regimen but will proceed with changes below in order to address continued symptoms. Medication has been helpful so far and patient has had no side effects other than those listed in the subjective portion of this note. Additionally, patient denies auditory or visual hallucinations. No paranoid thoughts, no delusional thought content expressed in this appointment. No thoughts of harm to self or others voiced. COUNSELING or THERAPY:   Continue to encourage therapy as part of ongoing treatment of their mental health concerns. Continue to encourage physical activity and adherence to medication regimen. DATE and changes made   6/2/21  · Previous medications   ? lexapro, abilify, effexor, wellbutrin previously, now on Cymbalta 60 mg   · 7/2/20   ? cymbalta was tapered and stopped. Was going to start wellbutrin  · 7/17/20 (between appts)  ? Patient actually started the wellbutrin on her own  · 7/22/20  ? No changes as patient has only been on wellbutrin a few days so far  · 8/10/20  ? Currently on 75 mg, Amitriptylline 100 mg QHS (after current taper has completed)  · 8/13/20  ? Pt is still on elavil 75 mg QHS, will increase to 100 mg in several more days and is doing well, improved depression and GI symptoms on this   · 9/2/20  ? No side effects from elavil and doing well at 100 mg QHS   ? Propranolol 10 mg TID PRN anxiety has been helful and is using as needed   · 10/7  ? Rec. Decreasing topamax as this may be worsening daytime sedation   ? Patient will take 100 mg QHS   ? Then continue amitryptylline   · 11/24  ? Patient having worsening mood, has not been on her pain medications 2/2 discharged from practice as she had been getting medical mj from the dispensary   ?  Patient will take amitriptylline 100 mg INCREASE to 125 mg QHS for one week, then to 150 mg QHS after that   · 12/15  ? Patient taking 150 mg QHS of elavil   § Helps with sleep, irritable bowel, less diarrhea, less pain, but not helping with motivation  § Elavil 100 mg QHS for sleep primarily (a decrease)  ? lexapro 5 mg QD   · 1/26/21  ? Lexapro 5 mg QD continue   ? Continue Elavil 100 mg QHS   ? No changes today  · 2/15  ? Elavil 100 mg QHS   ? START CYTOMEL 25 mcg   § CYTOMEL may be increased in 2 weeks to 50 mcg qd as needed and tolerated  § Augment for depression   § Is on multivitamin with calcium and vitamin D   ? Continue Lexapro  · 3/3/21  ? Amitriptyline 100 mg QHS   ? Lexapro 5 mg QD   ? Topamax 100 mg BID   ? INCREASE Cytomel 25 mcg QD to 50 mcg   · 4/7/21  ? CONTINUE liothyronine (Cytomel) 50 mcg which is being used as an augmentation to her antidepressant will decrease back to 25 mcg, patient reporting tremor, patient is reticent to get labwork due to fear of needles, but agrees to get thyroid labs to monitor this   ? Calcium and vitamin D started   ? IN ONE WEEK, will stop escitalopram (Lexapro) 5 mg   ? Consider augmentation with low dose methylphenidate (Ritalin) to address continue difficulty with motivation, however, given age will obtain EKG prior to this and will recommend cardiology referral for any abnormalities, especially with the concurrent medications of a tricyclic antidepressant and thyroid hormone (which we would likely stop prior to starting the stimulant, as it has been minimally beneficial)  § Also in light of risk of worsening osteopenia, this risk may outweigh the  benefits if depression is not improving appreciably with the liothyronine (Cytomel)   · 5/5/21  ? Decrease liothyronine (Cytomel) to 25 mcg for one week, then 25 mcg every other day for a week then decrease to 12.5 mcg every other day for one week then off  ?  Stopping this due to concern for worsening tachycardia and patient reports this is not working and she is having more tremor on this (L) 0.84 - 1.68 ng/dL   Comprehensive Metabolic Panel    Collection Time: 04/21/21  3:36 PM   Result Value Ref Range    Sodium 135 135 - 144 mEq/L    Potassium 4.1 3.4 - 4.9 mEq/L    Chloride 101 95 - 107 mEq/L    CO2 21 20 - 31 mEq/L    Anion Gap 13 9 - 15 mEq/L    Glucose 90 70 - 99 mg/dL    BUN 8 8 - 23 mg/dL    CREATININE 0.61 0.50 - 0.90 mg/dL    GFR Non-African American >60.0 >60    GFR  >60.0 >60    Calcium 9.8 8.5 - 9.9 mg/dL    Total Protein 7.0 6.3 - 8.0 g/dL    Albumin 4.3 3.5 - 4.6 g/dL    Total Bilirubin 0.4 0.2 - 0.7 mg/dL    Alkaline Phosphatase 122 40 - 130 U/L    ALT 40 (H) 0 - 33 U/L    AST 40 (H) 0 - 35 U/L    Globulin 2.7 2.3 - 3.5 g/dL   Hepatic Function Panel    Collection Time: 04/21/21  3:36 PM   Result Value Ref Range    Bilirubin, Direct <0.2 0.0 - 0.4 mg/dL    Bilirubin, Indirect see below 0.0 - 0.6 mg/dL   Vitamin B12 & Folate    Collection Time: 04/21/21  3:36 PM   Result Value Ref Range    Vitamin B-12 1102 232 - 1245 pg/mL    Folate >20.0 7.3 - 26.1 ng/mL   TSH without Reflex    Collection Time: 04/21/21  3:36 PM   Result Value Ref Range    TSH 0.012 (L) 0.440 - 3.860 uIU/mL   Drug Panel 9A Screen, Urine    Collection Time: 04/21/21  4:03 PM   Result Value Ref Range    Oxycodone Screen, Ur Negative Cutoff 100 ng/mL    MDMA, Urine Negative Cutoff 500 ng/mL    Amphetamine Screen, Urine Negative Cutoff 300 ng/mL    Barbiturate Screen, Ur Negative Cutoff 200 ng/mL    Benzodiazepine Screen, Urine Negative Cutoff 200 ng/mL    Creatinine, Ur <20.0 (L) 20.0 - 400.0 mg/dL    Propoxyphene Scrn, Ur Negative Cutoff 300 ng/mL    Cocaine Metabolite Screen, Urine Negative Cutoff 150 ng/mL    Methadone Screen, Urine Negative Cutoff 150 ng/mL    Opiate Scrn, Ur Positive Cutoff 300 ng/mL    PCP Screen, Urine Negative Cutoff 25 ng/mL    Cannabinoid Scrn, Ur Positive Cutoff 20 ng/mL    Drug Screen Comment: See Note     Alcohol, Urine Negative Cutoff 40 mg/dL       MEDICATIONS:    Current Outpatient Medications:     VORTIoxetine (TRINTELLIX) 5 MG tablet, Take 1 tablet by mouth daily, Disp: 56 tablet, Rfl: 0    HYDROcodone-acetaminophen (NORCO) 7.5-325 MG per tablet, Take 1 tablet by mouth every 8 hours as needed for Pain for up to 30 days. Intended supply: 30 days, Disp: 90 tablet, Rfl: 0    carvedilol (COREG) 12.5 MG tablet, Take 1 tablet by mouth 2 times daily, Disp: 60 tablet, Rfl: 3    aspirin EC 81 MG EC tablet, Take 1 tablet by mouth daily, Disp: 90 tablet, Rfl: 1    liothyronine (CYTOMEL) 25 MCG tablet, Take 1 tablet by mouth daily for 7 days, THEN 1 tablet every other day for 14 days, THEN 0.5 tablets every other day for 14 days. , Disp: 18 tablet, Rfl: 0    VORTIoxetine (TRINTELLIX) 5 MG tablet, Take 1 tablet by mouth daily, Disp: 30 tablet, Rfl: 3    amitriptyline (ELAVIL) 100 MG tablet, Take 1 tablet by mouth nightly, Disp: 30 tablet, Rfl: 3    topiramate (TOPAMAX) 100 MG tablet, Take 1 tablet by mouth 2 times daily, Disp: 180 tablet, Rfl: 2    atorvastatin (LIPITOR) 10 MG tablet, Take 1 tablet by mouth daily, Disp: 90 tablet, Rfl: 1    Multiple Vitamin (MULTI-VITAMIN DAILY PO), Take by mouth, Disp: , Rfl:     Wheat Dextrin (BENEFIBER) POWD, Take 4 g by mouth 3 times daily (with meals), Disp: , Rfl:     Examination:    Vitals: not taken in person, most recent vitals in chart reviewed  There were no vitals filed for this visit.     Wt Readings from Last 3 Encounters:   05/24/21 134 lb 8 oz (61 kg)   05/20/21 132 lb (59.9 kg)   04/21/21 133 lb 9.6 oz (60.6 kg)       BP Readings from Last 3 Encounters:   05/24/21 124/78   05/20/21 (!) 150/89   04/21/21 132/74         Mental Status Examination:    Level of consciousness:  alert and oriented to person, place, and situation  Appearance:  well-appearing good grooming and good hygiene  Behavior/Motor:  no abnormalities noted  Attitude toward examiner: friendly, pleasant and cooperative, attentive and good eye contact  Speech: spontaneous, normal rate and normal volume   Mood: \"better\"  Affect:  mood congruent  Thought processes:  linear and logical  Thought content:  Denies suicidal or homicidal ideation, denies auditory or visual hallucinations  Cognition:  no deficits in attention, concentration notable, recent memory grossly intact  Concentration intact  Memory intact  Insight good   Judgement fair   Fund of Knowledge adequate    PSYCHOTHERAPY/COUNSELING:  Encourage patient to attend outpatient appointments and therapy. [x] Therapeutic interview  [] Supportive  [x] CBT  [x] Ongoing  [] Other    No follow-ups on file. patient informed to call for follow-up or to reschedule appointment     Please note this report has been partially produced using speech recognition software  And may cause contain errors related to that system including grammar, punctuation and spelling as well as words and phrases that may seem inappropriate. If there are questions or concerns please feel free to contact me to clarify. Farhad Villalobos MD  Electronically signed by Farhad Villalobos MD on 6/2/2021 at 1:41 PM  6/2/2021 1:41 PM    Psychiatry   Due to this being a TeleHealth encounter, evaluation of the following organ systems is limited: Vitals/Constitutional/EENT/Resp/CV/GI//MS/Neuro/Skin/Heme-Lymph-Imm. An  electronic signature was used to authenticate this note. --Farhad Villalobos MD on 6/2/2021 at 1:41 PM    Pursuant to the emergency declaration under the [facility] waiver authority and the [employer] Act, this Virtual  Visit was conducted, with patient's consent, to reduce the patient's risk of exposure to COVID-19 and provide continuity of care for an established patient Services were provided through a video synchronous discussion virtually to substitute for in-person clinic visit.     Treatment Plan:  Reviewed current Medications with the patient. Education provided on the compliance with treatment. Reviewed OARRs, no concerns identified     The anticipated benefits and side effects of the medications, including the anticipated results of not receiving the medication, and of alternatives to the medications were explained to the patient and their informed consent was obtained for starting medications as well as adjusting the doses (titration or tapering) as indicated. The above information was given by physician in verbal form and sufficient understanding was in evidence. The patient participated in discussion of the information and question and/or concerns were addressed before the medication was given. Due to COVID 19 outbreak, patient's office visit was converted to a virtual visit.   Patient was contacted and agreed to proceed with a virtual visit via DOXY

## 2021-06-08 ENCOUNTER — TELEPHONE (OUTPATIENT)
Dept: CARDIOLOGY CLINIC | Age: 61
End: 2021-06-08

## 2021-06-08 NOTE — TELEPHONE ENCOUNTER
Patient calling. She states that when here last you had ordered 4 tests for her. She is on a limited budget and to have all 4 would cost her more money than she can afford. She is wondering what is the most important one she can do now and then work on the other ones later?     Stress nuclear  Echo  Carotid ultrasound  Ultrasound of aorta

## 2021-06-10 NOTE — TELEPHONE ENCOUNTER
Spenser Michele called back to say she is having all of the testing done. She said when she called scheduling back she was told she had been quoted incorrectly on the pricing for the testing.

## 2021-06-17 ENCOUNTER — HOSPITAL ENCOUNTER (OUTPATIENT)
Dept: ULTRASOUND IMAGING | Age: 61
Discharge: HOME | End: 2021-06-19
Payer: MEDICARE

## 2021-06-17 ENCOUNTER — HOSPITAL ENCOUNTER (OUTPATIENT)
Dept: NON INVASIVE DIAGNOSTICS | Age: 61
Discharge: HOME | End: 2021-06-17
Payer: MEDICARE

## 2021-06-17 ENCOUNTER — HOSPITAL ENCOUNTER (OUTPATIENT)
Dept: NUCLEAR MEDICINE | Age: 61
Discharge: HOME | End: 2021-06-19
Payer: MEDICARE

## 2021-06-17 DIAGNOSIS — F17.200 SMOKER: ICD-10-CM

## 2021-06-17 DIAGNOSIS — R42 DIZZY: ICD-10-CM

## 2021-06-17 DIAGNOSIS — R06.02 SHORTNESS OF BREATH: ICD-10-CM

## 2021-06-17 DIAGNOSIS — I10 ESSENTIAL HYPERTENSION: ICD-10-CM

## 2021-06-17 LAB
LV EF: 60 %
LVEF MODALITY: NORMAL

## 2021-06-17 PROCEDURE — 93978 VASCULAR STUDY: CPT | Performed by: INTERNAL MEDICINE

## 2021-06-17 PROCEDURE — A9502 TC99M TETROFOSMIN: HCPCS | Performed by: INTERNAL MEDICINE

## 2021-06-17 PROCEDURE — 93306 TTE W/DOPPLER COMPLETE: CPT

## 2021-06-17 PROCEDURE — 3430000000 HC RX DIAGNOSTIC RADIOPHARMACEUTICAL: Performed by: INTERNAL MEDICINE

## 2021-06-17 PROCEDURE — 2580000003 HC RX 258: Performed by: INTERNAL MEDICINE

## 2021-06-17 PROCEDURE — 93880 EXTRACRANIAL BILAT STUDY: CPT | Performed by: INTERNAL MEDICINE

## 2021-06-17 PROCEDURE — 78452 HT MUSCLE IMAGE SPECT MULT: CPT

## 2021-06-17 PROCEDURE — 93017 CV STRESS TEST TRACING ONLY: CPT

## 2021-06-17 PROCEDURE — 93880 EXTRACRANIAL BILAT STUDY: CPT

## 2021-06-17 PROCEDURE — 93978 VASCULAR STUDY: CPT

## 2021-06-17 PROCEDURE — 6360000002 HC RX W HCPCS: Performed by: INTERNAL MEDICINE

## 2021-06-17 RX ORDER — SODIUM CHLORIDE 0.9 % (FLUSH) 0.9 %
10 SYRINGE (ML) INJECTION PRN
Status: DISCONTINUED | OUTPATIENT
Start: 2021-06-17 | End: 2021-06-20 | Stop reason: HOSPADM

## 2021-06-17 RX ADMIN — TETROFOSMIN 32.4 MILLICURIE: 1.38 INJECTION, POWDER, LYOPHILIZED, FOR SOLUTION INTRAVENOUS at 09:55

## 2021-06-17 RX ADMIN — REGADENOSON 0.4 MG: 0.08 INJECTION, SOLUTION INTRAVENOUS at 09:55

## 2021-06-17 RX ADMIN — SODIUM CHLORIDE, PRESERVATIVE FREE 10 ML: 5 INJECTION INTRAVENOUS at 09:56

## 2021-06-17 RX ADMIN — SODIUM CHLORIDE, PRESERVATIVE FREE 10 ML: 5 INJECTION INTRAVENOUS at 09:55

## 2021-06-17 RX ADMIN — TETROFOSMIN 11.9 MILLICURIE: 1.38 INJECTION, POWDER, LYOPHILIZED, FOR SOLUTION INTRAVENOUS at 08:35

## 2021-06-18 NOTE — PROCEDURES
[facility] [address] [place] [address], [address] [facility]                              CARDIAC STRESS TEST    PATIENT NAME: Rahul Knutson                    :        1960  MED REC NO:   [id number]                            ROOM:  ACCOUNT NO:   [de-identified]                           ADMIT DATE: 2021  PROVIDER:     Mike Jarquin MD    CARDIOVASCULAR DIAGNOSTIC DEPARTMENT    DATE OF STUDY:  2021    LEXISCAN    ORDERING PROVIDERS:  Scarlett Langston MD and Sol Samuels NP    INDICATIONS:  Chest pain. TECHNIQUE:  At rest, the patient was injected with 11.9 mCi of Myoview. Resting images were obtained. The patient was then given 0.4 mg of  Lexiscan followed by administration of 32.4 mCi of Myoview. Stress  tomographic images were then obtained. Left ventricular ejection  fraction and gated wall motion were acquired. RESULTS:  Resting heart rate is 69 beats per minute. Heart rate  response was flat. Maximum heart rate achieved was 69 beats per minute. No diagnostic ST-segment changes were noted. IMAGING STUDIES:  Review of rest and stress tomographic images revealed  homogenous myocardial perfusion with no evidence of prior myocardial  infarction or ischemia. Left ventricular ejection fraction is normal at  80%. TID ratio is 0.94, which is within normal limits. IMPRESSION:  1. Homogenous myocardial perfusion with no evidence of prior myocardial  infarction or ischemia. 2.  Normal left ventricular ejection fraction at 80%. 3.  Normal TID ratio is 0.9, which is within normal limits.         Padmini Nguyen MD    D: 2021 #10:35:51       T: 2021 11:17:59     [id number]  Job#: [id number]     Doc#: [id number]    CC:

## 2021-06-21 ENCOUNTER — OFFICE VISIT (OUTPATIENT)
Dept: CARDIOLOGY CLINIC | Age: 61
End: 2021-06-21
Payer: MEDICARE

## 2021-06-21 VITALS — OXYGEN SATURATION: 99 % | HEART RATE: 69 BPM | SYSTOLIC BLOOD PRESSURE: 110 MMHG | DIASTOLIC BLOOD PRESSURE: 70 MMHG

## 2021-06-21 DIAGNOSIS — F17.200 SMOKER: ICD-10-CM

## 2021-06-21 DIAGNOSIS — R00.0 TACHYCARDIA: ICD-10-CM

## 2021-06-21 DIAGNOSIS — R94.31 ABNORMAL EKG: ICD-10-CM

## 2021-06-21 DIAGNOSIS — R06.02 SHORTNESS OF BREATH: Primary | ICD-10-CM

## 2021-06-21 DIAGNOSIS — I10 ESSENTIAL HYPERTENSION: ICD-10-CM

## 2021-06-21 PROCEDURE — 99214 OFFICE O/P EST MOD 30 MIN: CPT | Performed by: INTERNAL MEDICINE

## 2021-06-21 ASSESSMENT — ENCOUNTER SYMPTOMS
BLOOD IN STOOL: 0
GASTROINTESTINAL NEGATIVE: 1
SHORTNESS OF BREATH: 1
COUGH: 0
EYES NEGATIVE: 1
STRIDOR: 0
NAUSEA: 0
CHEST TIGHTNESS: 1
WHEEZING: 0

## 2021-06-21 NOTE — PROGRESS NOTES
OFFICE VISIT        Patient: Sylvia Diaz  YOB: 1960  MRN: [id number]    Chief Complaint: Tachycardia SOB abn ECG Hyperthyroid  Chief Complaint   Patient presents with    Results       CV Data:  6/21 CUS mild   6/21 Abd US - no AAA  6/21 SPECT negative   6/21 Echo EF 60      Subjective/HPI  For 6 months she has had BROWN Tachycardia dn fatigue. Does not exercise. Gets dizzy a lot. Feels chest heaviness as well.    6/21/21 No cp still with BROWN. No bleed no falls      Smoker 1 ppd  No ETOH  + FH  Disabled form spinal cord.  ( born with abnormal narrow canal)       EKG:    Past Medical History:   Diagnosis Date    Anxiety     Depression     Hyperlipidemia     Hypopotassemia     Lumbago     Osteopenia     PTSD (post-traumatic stress disorder)     Pulmonary embolism (HCC)     4 PE in bilat lungs    Spinal stenosis     congenital       Past Surgical History:   Procedure Laterality Date    HYSTERECTOMY      SPINE SURGERY      cervical x 4, fusions and reconstruction    TONSILLECTOMY      age 3       Family History   Problem Relation Age of Onset    Celiac Disease Neg Hx     Crohn's Disease Neg Hx        Social History     Socioeconomic History    Marital status:      Spouse name: Gaurav Hwang    Number of children: 1    Years of education:  15    Highest education level: Associate degree: occupational, technical, or vocational program   Occupational History    Occupation: On disability     Comment: Used to work as a  and did odd jobs   Tobacco Use    Smoking status: Current Every Day Smoker     Packs/day: 0.50     Years: 40.00     Pack years: 20.00     Types: Cigarettes    Smokeless tobacco: Never Used   Vaping Use    Vaping Use: Never used   Substance and Sexual Activity    Alcohol use: No    Drug use: Yes     Types: Marijuana     Comment: Medical Marijuana; CBD drops     Sexual activity: Not on file   Other Topics Concern    Not on file   Social History Narrative Lives at Home with her . [facility]      Had 1 son who is now  approximately 3 years ago (heroine addiction). She denies any addiction in herself or her . But she is on [address]. Social Determinants of Health     Financial Resource Strain:     Difficulty of Paying Living Expenses:    Food Insecurity:     Worried About Running Out of Food in the Last Year:     [address] in the Last Year:    Transportation Needs:     Lack of Transportation (Medical):  Lack of Transportation (Non-Medical):    Physical Activity:     Days of Exercise per Week:     Minutes of Exercise per Session:    Stress:     Feeling of Stress :    Social Connections:     Frequency of Communication with Friends and Family:     Frequency of Social Gatherings with Friends and Family:     Attends Sikh Services:     Active Member of Clubs or Organizations:     Attends Club or Organization Meetings:     Marital Status:    Intimate Partner Violence:     Fear of Current or Ex-Partner:     Emotionally Abused:     Physically Abused:     Sexually Abused: Allergies   Allergen Reactions    Latex Anaphylaxis     Rash    Fentanyl Rash     At site of application    Ketoprofen      Rash    Morphine Other (See Comments)     GI upset  Other reaction(s): GI Upset  GI upset    Nsaids     Tetracycline      Rash    Cefaclor Rash     Rash    Cymbalta [Duloxetine Hcl] Nausea And Vomiting    Tetracyclines & Related Rash       Current Outpatient Medications   Medication Sig Dispense Refill    HYDROcodone-acetaminophen (NORCO) 7.5-325 MG per tablet Take 1 tablet by mouth every 8 hours as needed for Pain for up to 30 days.  Intended supply: 30 days 90 tablet 0    carvedilol (COREG) 12.5 MG tablet Take 1 tablet by mouth 2 times daily 60 tablet 3    aspirin EC 81 MG EC tablet Take 1 tablet by mouth daily 90 tablet 1    VORTIoxetine (TRINTELLIX) 5 MG tablet Take 1 tablet by mouth daily 30 tablet 3    amitriptyline (ELAVIL) 100 MG tablet Take 1 tablet by mouth nightly 30 tablet 3    topiramate (TOPAMAX) 100 MG tablet Take 1 tablet by mouth 2 times daily 180 tablet 2    atorvastatin (LIPITOR) 10 MG tablet Take 1 tablet by mouth daily 90 tablet 1    Multiple Vitamin (MULTI-VITAMIN DAILY PO) Take by mouth      Wheat Dextrin (BENEFIBER) POWD Take 4 g by mouth 3 times daily (with meals)       No current facility-administered medications for this visit. Review of Systems:   Review of Systems   Constitutional: Positive for fatigue. Negative for diaphoresis. HENT: Negative. Eyes: Negative. Respiratory: Positive for chest tightness and shortness of breath. Negative for cough, wheezing and stridor. Cardiovascular: Positive for palpitations. Negative for chest pain and leg swelling. Gastrointestinal: Negative. Negative for blood in stool and nausea. Genitourinary: Negative. Musculoskeletal: Negative. Skin: Negative. Neurological: Positive for light-headedness. Negative for dizziness, syncope and weakness. Hematological: Negative. Psychiatric/Behavioral: Negative. Physical Examination:    /70 (Site: Right Upper Arm, Position: Sitting, Cuff Size: Small Adult)   Pulse 69   SpO2 99%    Physical Exam   Constitutional: She appears healthy. No distress. HENT:   Normal cephalic and Atraumatic   Eyes: Pupils are equal, round, and reactive to light. Neck: Thyroid normal. No JVD present. No neck adenopathy. No thyromegaly present. Cardiovascular: Normal rate, regular rhythm, intact distal pulses and normal pulses. Murmur heard. Pulmonary/Chest: Effort normal and breath sounds normal. She has no wheezes. She has no rales. She exhibits no tenderness. Abdominal: Soft. Bowel sounds are normal. There is no abdominal tenderness. Musculoskeletal:         General: No tenderness or edema. Normal range of motion.       Cervical back: Normal range of motion and neck supple. Neurological: She is alert and oriented to person, place, and time. Skin: Skin is warm. No cyanosis. Nails show no clubbing.        LABS:  CBC:   Lab Results   Component Value Date    WBC 12.5 04/21/2021    RBC 5.05 04/21/2021    HGB 15.4 04/21/2021    HCT 45.8 04/21/2021    MCV 90.7 04/21/2021    MCH 30.6 04/21/2021    MCHC 33.7 04/21/2021    RDW 13.0 04/21/2021     04/21/2021    MPV 8.3 12/10/2012     Lipids:  Lab Results   Component Value Date    CHOL 230 (H) 01/21/2021    CHOL 315 (H) 08/05/2020     Lab Results   Component Value Date    TRIG 237 (H) 01/21/2021    TRIG 181 (H) 08/05/2020    TRIG 118 12/10/2012     Lab Results   Component Value Date    HDL 46 01/21/2021    HDL 50 08/05/2020     Lab Results   Component Value Date    LDLCALC 137 (H) 01/21/2021    LDLCALC 229 (H) 08/05/2020     No results found for: LABVLDL, VLDL  No results found for: CHOLHDLRATIO  CMP:    Lab Results   Component Value Date     04/21/2021    K 4.1 04/21/2021     04/21/2021    CO2 21 04/21/2021    BUN 8 04/21/2021    CREATININE 0.61 04/21/2021    GFRAA >60.0 04/21/2021    LABGLOM >60.0 04/21/2021    GLUCOSE 90 04/21/2021    PROT 7.0 04/21/2021    LABALBU 4.3 04/21/2021    CALCIUM 9.8 04/21/2021    BILITOT 0.4 04/21/2021    ALKPHOS 122 04/21/2021    AST 40 04/21/2021    ALT 40 04/21/2021     BMP:    Lab Results   Component Value Date     04/21/2021    K 4.1 04/21/2021     04/21/2021    CO2 21 04/21/2021    BUN 8 04/21/2021    LABALBU 4.3 04/21/2021    CREATININE 0.61 04/21/2021    CALCIUM 9.8 04/21/2021    GFRAA >60.0 04/21/2021    LABGLOM >60.0 04/21/2021    GLUCOSE 90 04/21/2021     Magnesium:    Lab Results   Component Value Date    MG 2.0 02/10/2020     TSH:  Lab Results   Component Value Date    TSH 0.012 (L) 04/21/2021             Patient Active Problem List   Diagnosis    Anxiety and depression    Brachial neuritis or radiculitis    Cervical

## 2021-07-14 ENCOUNTER — PATIENT MESSAGE (OUTPATIENT)
Dept: FAMILY MEDICINE CLINIC | Age: 61
End: 2021-07-14

## 2021-07-14 DIAGNOSIS — M54.40 BILATERAL LOW BACK PAIN WITH SCIATICA, SCIATICA LATERALITY UNSPECIFIED, UNSPECIFIED CHRONICITY: ICD-10-CM

## 2021-07-14 DIAGNOSIS — M54.17 LUMBOSACRAL RADICULOPATHY DUE TO TRAUMA: ICD-10-CM

## 2021-07-14 DIAGNOSIS — G89.4 CHRONIC PAIN DISORDER: ICD-10-CM

## 2021-07-14 DIAGNOSIS — M47.12 CERVICAL SPONDYLOSIS WITH MYELOPATHY: ICD-10-CM

## 2021-07-14 RX ORDER — HYDROCODONE BITARTRATE AND ACETAMINOPHEN 7.5; 325 MG/1; MG/1
1 TABLET ORAL EVERY 8 HOURS PRN
Qty: 90 TABLET | Refills: 0 | Status: SHIPPED | OUTPATIENT
Start: 2021-07-14 | End: 2021-08-13

## 2021-07-14 NOTE — TELEPHONE ENCOUNTER
Pt states she has not been in to see pain management, states she signed a controlled substance form with you ? At last office visit.

## 2021-08-04 ENCOUNTER — VIRTUAL VISIT (OUTPATIENT)
Dept: BEHAVIORAL/MENTAL HEALTH CLINIC | Age: 61
End: 2021-08-04
Payer: MEDICARE

## 2021-08-04 DIAGNOSIS — F33.1 MAJOR DEPRESSIVE DISORDER, RECURRENT EPISODE, MODERATE (HCC): Primary | ICD-10-CM

## 2021-08-04 DIAGNOSIS — M54.12 BRACHIAL NEURITIS OR RADICULITIS: ICD-10-CM

## 2021-08-04 DIAGNOSIS — Z79.899 OTHER LONG TERM (CURRENT) DRUG THERAPY: ICD-10-CM

## 2021-08-04 DIAGNOSIS — Z51.81 THERAPEUTIC DRUG MONITORING: ICD-10-CM

## 2021-08-04 DIAGNOSIS — F41.1 GAD (GENERALIZED ANXIETY DISORDER): ICD-10-CM

## 2021-08-04 DIAGNOSIS — F43.10 PTSD (POST-TRAUMATIC STRESS DISORDER): ICD-10-CM

## 2021-08-04 PROCEDURE — 99215 OFFICE O/P EST HI 40 MIN: CPT | Performed by: PSYCHIATRY & NEUROLOGY

## 2021-08-04 RX ORDER — AMITRIPTYLINE HYDROCHLORIDE 100 MG/1
100 TABLET, FILM COATED ORAL NIGHTLY
Qty: 30 TABLET | Refills: 3 | Status: ON HOLD | OUTPATIENT
Start: 2021-08-04 | End: 2021-09-01 | Stop reason: HOSPADM
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
## 2021-08-05 DIAGNOSIS — Z51.81 THERAPEUTIC DRUG MONITORING: ICD-10-CM

## 2021-08-05 DIAGNOSIS — Z79.899 OTHER LONG TERM (CURRENT) DRUG THERAPY: ICD-10-CM

## 2021-08-05 LAB
ALBUMIN SERPL-MCNC: 4.7 G/DL (ref 3.5–4.6)
ALP BLD-CCNC: 107 U/L (ref 40–130)
ALT SERPL-CCNC: 26 U/L (ref 0–33)
ANION GAP SERPL CALCULATED.3IONS-SCNC: 12 MEQ/L (ref 9–15)
AST SERPL-CCNC: 26 U/L (ref 0–35)
BILIRUB SERPL-MCNC: 0.5 MG/DL (ref 0.2–0.7)
BILIRUBIN URINE: NEGATIVE
BLOOD, URINE: NEGATIVE
BUN BLDV-MCNC: 11 MG/DL (ref 8–23)
CALCIUM SERPL-MCNC: 10.4 MG/DL (ref 8.5–9.9)
CHLORIDE BLD-SCNC: 101 MEQ/L (ref 95–107)
CLARITY: CLEAR
CO2: 22 MEQ/L (ref 20–31)
COLOR: YELLOW
CREAT SERPL-MCNC: 0.64 MG/DL (ref 0.5–0.9)
GFR AFRICAN AMERICAN: >60
GFR NON-AFRICAN AMERICAN: >60
GLOBULIN: 2.5 G/DL (ref 2.3–3.5)
GLUCOSE BLD-MCNC: 84 MG/DL (ref 70–99)
GLUCOSE URINE: NEGATIVE MG/DL
HBA1C MFR BLD: 5.6 % (ref 4.8–5.9)
KETONES, URINE: NEGATIVE MG/DL
LEUKOCYTE ESTERASE, URINE: NEGATIVE
NITRITE, URINE: NEGATIVE
PH UA: 7.5 (ref 5–9)
POTASSIUM SERPL-SCNC: 4 MEQ/L (ref 3.4–4.9)
PROTEIN UA: NEGATIVE MG/DL
SODIUM BLD-SCNC: 135 MEQ/L (ref 135–144)
SPECIFIC GRAVITY UA: 1 (ref 1–1.03)
TOTAL PROTEIN: 7.2 G/DL (ref 6.3–8)
URINE REFLEX TO CULTURE: NORMAL
UROBILINOGEN, URINE: 0.2 E.U./DL

## 2021-08-10 LAB
AMITRIPTYLINE + NORTRIPTYLINE: 124 NG/ML (ref 95–250)
AMITRIPTYLINE: 66 NG/ML
NORTRIPTYLINE: 58 NG/ML (ref 50–150)

## 2021-08-13 ENCOUNTER — PATIENT MESSAGE (OUTPATIENT)
Dept: BEHAVIORAL/MENTAL HEALTH CLINIC | Age: 61
End: 2021-08-13

## 2021-08-13 NOTE — TELEPHONE ENCOUNTER
From: Ruchi Cody  To: Deniz Pyle MD  Sent: 8/13/2021 1:51 PM EDT  Subject: Test Results Question    Hi Dr. Trinity Spain. You told me to contact you if I had not heard from you once the results of my blood work were in. Please let me know your thoughts on the results and if you plan on increasing my trintellix, amitriptyline or adding an additional medication. Thanks.  Sarah Max

## 2021-08-17 NOTE — TELEPHONE ENCOUNTER
Gilberto Bennett would like to talk with you before we do the change. I will ask the office to call you to try to make an appointment sooner with me. I have viewed the lab results we should just discuss it first. I have openings next Tuesday right now. No medication changes until we talk.      Maria Del Carmen Richard MD

## 2021-08-24 ENCOUNTER — VIRTUAL VISIT (OUTPATIENT)
Dept: BEHAVIORAL/MENTAL HEALTH CLINIC | Age: 61
End: 2021-08-24
Payer: MEDICARE

## 2021-08-24 DIAGNOSIS — F41.1 GAD (GENERALIZED ANXIETY DISORDER): ICD-10-CM

## 2021-08-24 DIAGNOSIS — F33.2 SEVERE EPISODE OF RECURRENT MAJOR DEPRESSIVE DISORDER, WITHOUT PSYCHOTIC FEATURES (HCC): Primary | ICD-10-CM

## 2021-08-24 PROCEDURE — 99215 OFFICE O/P EST HI 40 MIN: CPT | Performed by: PSYCHIATRY & NEUROLOGY
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
## 2021-08-28 ENCOUNTER — HOSPITAL ENCOUNTER (INPATIENT)
Age: 61
LOS: 4 days | Discharge: HOME | DRG: 885 | End: 2021-09-01
Attending: STUDENT IN AN ORGANIZED HEALTH CARE EDUCATION/TRAINING PROGRAM | Admitting: PSYCHIATRY & NEUROLOGY
Payer: MEDICARE

## 2021-08-28 DIAGNOSIS — F17.200 TOBACCO DEPENDENCE: ICD-10-CM

## 2021-08-28 DIAGNOSIS — F32.9 MAJOR DEPRESSIVE DISORDER WITHOUT PSYCHOTIC FEATURES: Primary | ICD-10-CM

## 2021-08-28 DIAGNOSIS — G89.4 CHRONIC PAIN ASSOCIATED WITH SIGNIFICANT PSYCHOSOCIAL DYSFUNCTION: ICD-10-CM

## 2021-08-28 PROBLEM — F33.2 MAJOR DEPRESSIVE DISORDER, RECURRENT SEVERE WITHOUT PSYCHOTIC FEATURES (HCC): Status: ACTIVE | Noted: 2021-08-28

## 2021-08-28 LAB
ACETAMINOPHEN LEVEL: <5 UG/ML (ref 10–30)
ALBUMIN SERPL-MCNC: 4.5 G/DL (ref 3.5–4.6)
ALP BLD-CCNC: 109 U/L (ref 40–130)
ALT SERPL-CCNC: 32 U/L (ref 0–33)
AMPHETAMINE SCREEN, URINE: ABNORMAL
ANION GAP SERPL CALCULATED.3IONS-SCNC: 11 MEQ/L (ref 9–15)
AST SERPL-CCNC: 41 U/L (ref 0–35)
BARBITURATE SCREEN URINE: ABNORMAL
BASOPHILS ABSOLUTE: 0.1 K/UL (ref 0–0.2)
BASOPHILS RELATIVE PERCENT: 0.5 %
BENZODIAZEPINE SCREEN, URINE: ABNORMAL
BILIRUB SERPL-MCNC: 0.5 MG/DL (ref 0.2–0.7)
BILIRUBIN URINE: NEGATIVE
BLOOD, URINE: NEGATIVE
BUN BLDV-MCNC: 9 MG/DL (ref 8–23)
CALCIUM SERPL-MCNC: 9.9 MG/DL (ref 8.5–9.9)
CANNABINOID SCREEN URINE: POSITIVE
CHLORIDE BLD-SCNC: 102 MEQ/L (ref 95–107)
CK MB: 1.3 NG/ML (ref 0–3.8)
CLARITY: CLEAR
CO2: 25 MEQ/L (ref 20–31)
COCAINE METABOLITE SCREEN URINE: ABNORMAL
COLOR: YELLOW
CREAT SERPL-MCNC: 0.62 MG/DL (ref 0.5–0.9)
CREATINE KINASE-MB INDEX: 1.9 % (ref 0–3.5)
EOSINOPHILS ABSOLUTE: 0.2 K/UL (ref 0–0.7)
EOSINOPHILS RELATIVE PERCENT: 1.7 %
ETHANOL PERCENT: NORMAL G/DL
ETHANOL: <10 MG/DL (ref 0–0.08)
GFR AFRICAN AMERICAN: >60
GFR NON-AFRICAN AMERICAN: >60
GLOBULIN: 2.9 G/DL (ref 2.3–3.5)
GLUCOSE BLD-MCNC: 85 MG/DL (ref 70–99)
GLUCOSE URINE: NEGATIVE MG/DL
HCT VFR BLD CALC: 46.5 % (ref 37–47)
HEMOGLOBIN: 16 G/DL (ref 12–16)
KETONES, URINE: NEGATIVE MG/DL
LEUKOCYTE ESTERASE, URINE: NEGATIVE
LYMPHOCYTES ABSOLUTE: 3.5 K/UL (ref 1–4.8)
LYMPHOCYTES RELATIVE PERCENT: 28.2 %
Lab: ABNORMAL
MCH RBC QN AUTO: 32.2 PG (ref 27–31.3)
MCHC RBC AUTO-ENTMCNC: 34.4 % (ref 33–37)
MCV RBC AUTO: 93.5 FL (ref 82–100)
METHADONE SCREEN, URINE: ABNORMAL
MONOCYTES ABSOLUTE: 0.6 K/UL (ref 0.2–0.8)
MONOCYTES RELATIVE PERCENT: 4.7 %
NEUTROPHILS ABSOLUTE: 8.1 K/UL (ref 1.4–6.5)
NEUTROPHILS RELATIVE PERCENT: 64.9 %
NITRITE, URINE: NEGATIVE
OPIATE SCREEN URINE: ABNORMAL
OXYCODONE URINE: ABNORMAL
PDW BLD-RTO: 14.2 % (ref 11.5–14.5)
PH UA: 7.5 (ref 5–9)
PHENCYCLIDINE SCREEN URINE: ABNORMAL
PLATELET # BLD: 274 K/UL (ref 130–400)
POTASSIUM SERPL-SCNC: 5.1 MEQ/L (ref 3.4–4.9)
PROPOXYPHENE SCREEN: ABNORMAL
PROTEIN UA: NEGATIVE MG/DL
RBC # BLD: 4.98 M/UL (ref 4.2–5.4)
SALICYLATE, SERUM: <0.3 MG/DL (ref 15–30)
SARS-COV-2, NAAT: NOT DETECTED
SODIUM BLD-SCNC: 138 MEQ/L (ref 135–144)
SPECIFIC GRAVITY UA: 1.01 (ref 1–1.03)
TOTAL CK: 67 U/L (ref 0–170)
TOTAL PROTEIN: 7.4 G/DL (ref 6.3–8)
TSH SERPL DL<=0.05 MIU/L-ACNC: 1.41 UIU/ML (ref 0.44–3.86)
URINE REFLEX TO CULTURE: NORMAL
UROBILINOGEN, URINE: 0.2 E.U./DL
WBC # BLD: 12.5 K/UL (ref 4.8–10.8)

## 2021-08-28 PROCEDURE — 84443 ASSAY THYROID STIM HORMONE: CPT

## 2021-08-28 PROCEDURE — 82553 CREATINE MB FRACTION: CPT

## 2021-08-28 PROCEDURE — 82077 ASSAY SPEC XCP UR&BREATH IA: CPT

## 2021-08-28 PROCEDURE — 80307 DRUG TEST PRSMV CHEM ANLYZR: CPT

## 2021-08-28 PROCEDURE — 87635 SARS-COV-2 COVID-19 AMP PRB: CPT

## 2021-08-28 PROCEDURE — 93005 ELECTROCARDIOGRAM TRACING: CPT | Performed by: STUDENT IN AN ORGANIZED HEALTH CARE EDUCATION/TRAINING PROGRAM

## 2021-08-28 PROCEDURE — 1240000000 HC EMOTIONAL WELLNESS R&B

## 2021-08-28 PROCEDURE — 80179 DRUG ASSAY SALICYLATE: CPT

## 2021-08-28 PROCEDURE — 81003 URINALYSIS AUTO W/O SCOPE: CPT

## 2021-08-28 PROCEDURE — 80053 COMPREHEN METABOLIC PANEL: CPT

## 2021-08-28 PROCEDURE — 80143 DRUG ASSAY ACETAMINOPHEN: CPT

## 2021-08-28 PROCEDURE — 80306 DRUG TEST PRSMV INSTRMNT: CPT

## 2021-08-28 PROCEDURE — 82550 ASSAY OF CK (CPK): CPT

## 2021-08-28 PROCEDURE — 99285 EMERGENCY DEPT VISIT HI MDM: CPT

## 2021-08-28 PROCEDURE — 85025 COMPLETE CBC W/AUTO DIFF WBC: CPT

## 2021-08-28 PROCEDURE — 36415 COLL VENOUS BLD VENIPUNCTURE: CPT

## 2021-08-28 RX ORDER — NICOTINE 21 MG/24HR
1 PATCH, TRANSDERMAL 24 HOURS TRANSDERMAL ONCE
Status: DISCONTINUED | OUTPATIENT
Start: 2021-08-28 | End: 2021-08-29 | Stop reason: CLARIF

## 2021-08-28 ASSESSMENT — ENCOUNTER SYMPTOMS
CHEST TIGHTNESS: 0
SINUS PRESSURE: 0
DIARRHEA: 0
TROUBLE SWALLOWING: 0
VOMITING: 0
ABDOMINAL PAIN: 0
COUGH: 0
BACK PAIN: 0
SHORTNESS OF BREATH: 0

## 2021-08-28 ASSESSMENT — PATIENT HEALTH QUESTIONNAIRE - PHQ9: SUM OF ALL RESPONSES TO PHQ QUESTIONS 1-9: 27

## 2021-08-28 NOTE — ED NOTES
Report to 3w, informed admission pending medical clearance and getting urine.      Andres Cantu RN  08/28/21 5096

## 2021-08-28 NOTE — ED PROVIDER NOTES
nervous/anxious and is not hyperactive. All other systems reviewed and are negative. Except as noted above the remainder of the review of systems was reviewed and negative.        PAST MEDICAL HISTORY     Past Medical History:   Diagnosis Date    Anxiety     Depression     Hyperlipidemia     Hypopotassemia     Lumbago     Osteopenia     PTSD (post-traumatic stress disorder)     Pulmonary embolism (HCC)     4 PE in bilat lungs    Spinal stenosis     congenital         SURGICALHISTORY       Past Surgical History:   Procedure Laterality Date    HYSTERECTOMY      SPINE SURGERY      cervical x 4, fusions and reconstruction    TONSILLECTOMY      age 3         CURRENT MEDICATIONS       Previous Medications    AMITRIPTYLINE (ELAVIL) 100 MG TABLET    Take 1 tablet by mouth nightly    ASPIRIN EC 81 MG EC TABLET    Take 1 tablet by mouth daily    ATORVASTATIN (LIPITOR) 10 MG TABLET    Take 1 tablet by mouth daily    CARVEDILOL (COREG) 12.5 MG TABLET    Take 1 tablet by mouth 2 times daily    MULTIPLE VITAMIN (MULTI-VITAMIN DAILY PO)    Take by mouth    TOPIRAMATE (TOPAMAX) 100 MG TABLET    Take 1 tablet by mouth 2 times daily    VORTIOXETINE (TRINTELLIX) 10 MG TABS TABLET    Take 1 tablet by mouth daily    WHEAT DEXTRIN (BENEFIBER) POWD    Take 4 g by mouth 3 times daily (with meals)       ALLERGIES     Latex, Fentanyl, Ketoprofen, Morphine, Nsaids, Tetracycline, Cefaclor, Cymbalta [duloxetine hcl], and Tetracyclines & related    FAMILY HISTORY       Family History   Problem Relation Age of Onset    Celiac Disease Neg Hx     Crohn's Disease Neg Hx           SOCIAL HISTORY       Social History     Socioeconomic History    Marital status:      Spouse name: Rose Ross    Number of children: 1    Years of education:  15    Highest education level: Associate degree: occupational, technical, or vocational program   Occupational History    Occupation: On disability     Comment: Used to work as a  and did odd jobs   Tobacco Use    Smoking status: Current Every Day Smoker     Packs/day: 0.50     Years: 40.00     Pack years: 20.00     Types: Cigarettes    Smokeless tobacco: Never Used   Vaping Use    Vaping Use: Never used   Substance and Sexual Activity    Alcohol use: No    Drug use: Yes     Types: Marijuana     Comment: Medical Marijuana; CBD drops     Sexual activity: None   Other Topics Concern    None   Social History Narrative    Lives at Home with her . [facility] in  [address]     Had 1 son who is now  approximately 3 years ago (heroine addiction). She denies any addiction in herself or her . But she is on [address]. Social Determinants of Health     Financial Resource Strain:     Difficulty of Paying Living Expenses:    Food Insecurity:     Worried About Running Out of Food in the Last Year:     [address] in the Last Year:    Transportation Needs:     Lack of Transportation (Medical):      Lack of Transportation (Non-Medical):    Physical Activity:     Days of Exercise per Week:     Minutes of Exercise per Session:    Stress:     Feeling of Stress :    Social Connections:     Frequency of Communication with Friends and Family:     Frequency of Social Gatherings with Friends and Family:     Attends [employer] Services:     Active Member of Clubs or Organizations:     Attends Club or Organization Meetings:     Marital Status:    Intimate Partner Violence:     Fear of Current or Ex-Partner:     Emotionally Abused:     Physically Abused:     Sexually Abused:        SCREENINGS    Srihdar Coma Scale  Eye Opening: Spontaneous  Best Verbal Response: Oriented  Best Motor Response: Obeys commands  Sridhar Coma Scale Score: 15 @FLOW([id number])@      PHYSICAL EXAM    (up to 7 for level 4, 8 or more for level 5)     ED Triage Vitals [21 1438]   BP Temp Temp Source Pulse Resp SpO2 Height Weight   125/78 98.5 °F (36.9 °C) Oral 60 15 99 % 5' 3\" (1.6 m) 125 lb (56.7 kg)       Physical Exam  Vitals and nursing note reviewed. Constitutional:       General: She is awake. She is not in acute distress. Appearance: Normal appearance. She is well-developed and normal weight. She is not ill-appearing, toxic-appearing or diaphoretic. Comments: No photophobia. No phonophobia. HENT:      Head: Normocephalic and atraumatic. No Manriquez's sign. Right Ear: External ear normal.      Left Ear: External ear normal.      Nose: Nose normal. No congestion or rhinorrhea. Mouth/Throat:      Mouth: Mucous membranes are moist.      Pharynx: Oropharynx is clear. No oropharyngeal exudate or posterior oropharyngeal erythema. Eyes:      General: No scleral icterus. Right eye: No foreign body or discharge. Left eye: No discharge. Extraocular Movements: Extraocular movements intact. Conjunctiva/sclera: Conjunctivae normal.      Left eye: No exudate. Pupils: Pupils are equal, round, and reactive to light. Neck:      Vascular: No JVD. Trachea: No tracheal deviation. Comments: No meningismus. Cardiovascular:      Rate and Rhythm: Normal rate and regular rhythm. Pulses: Normal pulses. Heart sounds: Normal heart sounds. Heart sounds not distant. No murmur heard. No friction rub. No gallop. Pulmonary:      Effort: Pulmonary effort is normal. No respiratory distress. Breath sounds: Normal breath sounds. No stridor. No wheezing, rhonchi or rales. Chest:      Chest wall: No tenderness. Abdominal:      General: Abdomen is flat. Bowel sounds are normal. There is no distension. Palpations: Abdomen is soft. There is no mass. Tenderness: There is no abdominal tenderness. There is no right CVA tenderness, left CVA tenderness, guarding or rebound. Hernia: No hernia is present. Musculoskeletal:         General: No swelling, tenderness, deformity or signs of injury.  Normal range of motion. Cervical back: Normal range of motion and neck supple. No rigidity. Lymphadenopathy:      Head:      Right side of head: No submental adenopathy. Left side of head: No submental adenopathy. Skin:     General: Skin is warm and dry. Capillary Refill: Capillary refill takes less than 2 seconds. Coloration: Skin is not jaundiced or pale. Findings: No bruising, erythema, lesion or rash. Neurological:      General: No focal deficit present. Mental Status: She is alert and oriented to person, place, and time. Mental status is at baseline. Cranial Nerves: No cranial nerve deficit. Sensory: No sensory deficit. Motor: No weakness. Coordination: Coordination normal.      Deep Tendon Reflexes: Reflexes are normal and symmetric. Psychiatric:         Attention and Perception: Attention and perception normal.         Mood and Affect: Mood is depressed. Affect is flat. Speech: Speech normal.         Behavior: Behavior is withdrawn. Behavior is cooperative. Thought Content: Thought content includes suicidal ideation. Thought content includes suicidal plan. Cognition and Memory: Cognition and memory normal.         Judgment: Judgment normal.         DIAGNOSTIC RESULTS     EKG: All EKG's are interpreted by the Emergency Department Physician who either signs or Co-signsthis chart in the absence of a cardiologist.    EKG #1: Sinus bradycardia 54 bpm.  Normal axis. T wave inversion in aVL. RSR pattern in V1 V2 consistent with an incomplete right bundle branch block. Diffuse low voltage. No PVCs. EKG #2: Sinus bradycardia 55 bpm.  Normal axis. T wave inversion in aVL. QT intervals 442 ms. QTc is 422 ms. There is a QS pattern in V1 and V2. There are no PVCs.     RADIOLOGY:   Non-plain filmimages such as CT, Ultrasound and MRI are read by the radiologist. Plain radiographic images are visualized and preliminarily interpreted by the emergency physician with the below findings:        Interpretation per the Radiologist below, if available at the time ofthis note:    No orders to display         ED BEDSIDE ULTRASOUND:   Performed by ED Physician - none    LABS:  Labs Reviewed   ACETAMINOPHEN LEVEL - Abnormal; Notable for the following components:       Result Value    Acetaminophen Level <5 (*)     All other components within normal limits   CBC WITH AUTO DIFFERENTIAL - Abnormal; Notable for the following components:    WBC 12.5 (*)     MCH 32.2 (*)     Neutrophils Absolute 8.1 (*)     All other components within normal limits   COMPREHENSIVE METABOLIC PANEL - Abnormal; Notable for the following components:    Potassium 5.1 (*)     AST 41 (*)     All other components within normal limits   SALICYLATE LEVEL - Abnormal; Notable for the following components:    Salicylate, Serum <3.7 (*)     All other components within normal limits   CK   CKMB & RELATIVE PERCENT   ETHANOL   TSH WITHOUT REFLEX   URINE DRUG SCREEN   URINE RT REFLEX TO CULTURE   DRUG SCREEN TRICYCLIC URINE   POC PREGNANCY UR-QUAL       All other labs were within normal range or not returned as of this dictation. EMERGENCY DEPARTMENT COURSE and DIFFERENTIAL DIAGNOSIS/MDM:   Vitals:    Vitals:    08/28/21 1600 08/28/21 1615 08/28/21 1630 08/28/21 1804   BP: (!) 144/80   (!) 142/78   Pulse: 60 59 59 62   Resp: 15 20 14 14   Temp:    98.1 °F (36.7 °C)   TempSrc:    Oral   SpO2: 97%   97%   Weight:       Height:               MDM  With the exception of urinalysis the patient is otherwise medically clear for psychiatric services. Plan is for admission of the hospital to the psychiatric service with a diagnosis of major depression without chronic features. CONSULTS:  IP CONSULT TO HOSPITALIST    PROCEDURES:  Unless otherwise noted below, none     Procedures    FINAL IMPRESSION    No diagnosis found.       DISPOSITION/PLAN   DISPOSITION        PATIENT REFERRED TO:  No follow-up provider specified.     DISCHARGE MEDICATIONS:  New Prescriptions    No medications on file          (Please note that portions of this note were completed with a voice recognition program.  Efforts were made to edit the dictations but occasionally words are mis-transcribed.)    David Freire DO (electronically signed)  Attending Emergency Physician          David Freire DO  08/28/21 2965

## 2021-08-28 NOTE — ED NOTES
Provisional Diagnosis:    Major Depression, severe, recurrent, without psych features    Psychosocial and Contextual Factors:    Lives with her , retired    C-SSRS Summary:     Patient: C-SSRS Suicide Screening  1) Within the past month, have you wished you were dead or wished you could go to sleep and not wake up? : Yes  2) Have you actually had any thoughts of killing yourself? : Yes  3) Have you been thinking about how you might kill yourself? : Yes  4) Have you had these thoughts and had some intention of acting on them? : Yes  5) Have you started to work out or worked out the details of how to kill yourself? Do you intend to carry out this plan? : Yes  6) Have you ever done anything, started to do anything, or prepared to do anything to end your life?: Yes  Did this occur within the past 3 months? : Yes    Family:     Agency: Dr Alissa King  Physical Abuse: Denies  Verbal Abuse: Denies  Emotional abuse: Denies  Financial Abuse: Denies  Sexual abuse: Denies  Elder abuse: No    Clinical Summary:    64year old female presented to ED post overdose attempts. She states at 1130 this morning she purposely attempted to overdose on an old Klonopin and Remeron prescription, unsure of quantity. She states she has had many going stressors for year,s has tried many support groups and medications, and has had no effect. She states she \"Just wanted it all to end\". Primary stressors at this time are loosing her best in 2014 to a heart attack, and he only son in 2017 to an opiod overdose. She states she never fully got over or recovered from either event, despite trying. She admits her  is very supportive. She acknowledges had she been successful in her overdose, he would have been devastated, but appears very ambivalent. She denies any HI, or A/V hallucinations. Level of Care Disposition:      Per Dr Augustine Fernandez- admit to 3w, pending medical clearance.  Hold home medications

## 2021-08-28 NOTE — ED NOTES
medication verified with Dr Leela De records from recent visit.      Christine Adame RN  08/28/21 0808

## 2021-08-28 NOTE — ED TRIAGE NOTES
Pt presents to ED via EMS c/o OD. Per EMS, neighbor called 911 when they found patient. EMS administered 2mg Narcan IV @ 14:15. Upon arrival, patient states she wanted to OD on purpose and she was trying to kill herself. Patient states she took 5- 1mg Klonopin, 11-7.5mg Mirtazapine, 2- 10mg Propanolol, Atorvastatin 10mg. Patient states her son  and she wants to be with him. Patient states she has thought about going to the train tracks to kill herself. Poison Control called.

## 2021-08-28 NOTE — ED NOTES
Zone 1 notified if repeat EKG ok, patient can be brought to [facility].      Lazarus Opoka, RN  08/28/21 2740

## 2021-08-28 NOTE — BH NOTE
Received report from [address] [place] [address]  Patient is a 64year old female presented to ED post overdose attempts. She states at 1130 this morning she purposely attempted to overdose on an old Klonopin and Remeron prescription, unsure of quantity. She states she \"Just wanted it all to end\". Primary stressors at this time are loosing her best in 2014 to a heart attack, and her only son in 2017 to an opiod overdose. She states she never fully got over or recovered from either event, despite trying. She admits her  is very supportive. She acknowledges had she been successful in her overdose, he would have been devastated, but appears very ambivalent.  She denies any HI, or A/V hallucinations.

## 2021-08-28 NOTE — ED NOTES
Per Dr Ronne Apley, patient medically cleared pending repeat EKG. Given 3w dispo.      Jessika Hernandez RN  08/28/21 7286

## 2021-08-29 LAB
AMPHETAMINE SCREEN, URINE: ABNORMAL
BARBITURATE SCREEN URINE: ABNORMAL
BENZODIAZEPINE SCREEN, URINE: POSITIVE
CANNABINOID SCREEN URINE: POSITIVE
COCAINE METABOLITE SCREEN URINE: ABNORMAL
METHADONE SCREEN, URINE: ABNORMAL
METHAMPHETAMINE, URINE: ABNORMAL
OPIATE SCREEN URINE: POSITIVE
PHENCYCLIDINE SCREEN URINE: ABNORMAL
PROPOXYPHENE SCREEN, URINE: ABNORMAL
TRICYCLIC, URINE: POSITIVE
UR OXYCODONE RAPID SCREEN: ABNORMAL

## 2021-08-29 PROCEDURE — 6370000000 HC RX 637 (ALT 250 FOR IP): Performed by: NURSE PRACTITIONER

## 2021-08-29 PROCEDURE — 6370000000 HC RX 637 (ALT 250 FOR IP): Performed by: PSYCHIATRY & NEUROLOGY

## 2021-08-29 PROCEDURE — 1240000000 HC EMOTIONAL WELLNESS R&B

## 2021-08-29 RX ORDER — LORAZEPAM 1 MG/1
2 TABLET ORAL ONCE
Status: COMPLETED | OUTPATIENT
Start: 2021-08-29 | End: 2021-08-29

## 2021-08-29 RX ORDER — LORAZEPAM 2 MG/ML
2 INJECTION INTRAMUSCULAR ONCE
Status: COMPLETED | OUTPATIENT
Start: 2021-08-29 | End: 2021-08-29

## 2021-08-29 RX ORDER — TOPIRAMATE 100 MG/1
100 TABLET, FILM COATED ORAL 2 TIMES DAILY
Status: DISCONTINUED | OUTPATIENT
Start: 2021-08-29 | End: 2021-08-31

## 2021-08-29 RX ORDER — MULTIVITAMIN WITH IRON
1 TABLET ORAL DAILY
Status: DISCONTINUED | OUTPATIENT
Start: 2021-08-29 | End: 2021-09-01 | Stop reason: HOSPADM

## 2021-08-29 RX ORDER — LORAZEPAM 2 MG/ML
INJECTION INTRAMUSCULAR
Status: DISPENSED
Start: 2021-08-29 | End: 2021-08-30

## 2021-08-29 RX ORDER — NICOTINE 21 MG/24HR
1 PATCH, TRANSDERMAL 24 HOURS TRANSDERMAL DAILY
Status: DISCONTINUED | OUTPATIENT
Start: 2021-08-29 | End: 2021-08-29 | Stop reason: CLARIF

## 2021-08-29 RX ORDER — ATORVASTATIN CALCIUM 10 MG/1
10 TABLET, FILM COATED ORAL DAILY
Status: DISCONTINUED | OUTPATIENT
Start: 2021-08-29 | End: 2021-09-01 | Stop reason: HOSPADM

## 2021-08-29 RX ORDER — HYDROCODONE BITARTRATE AND ACETAMINOPHEN 7.5; 325 MG/1; MG/1
1 TABLET ORAL EVERY 8 HOURS PRN
Status: DISCONTINUED | OUTPATIENT
Start: 2021-08-29 | End: 2021-09-01 | Stop reason: HOSPADM

## 2021-08-29 RX ORDER — NICOTINE 21 MG/24HR
1 PATCH, TRANSDERMAL 24 HOURS TRANSDERMAL DAILY
Status: DISCONTINUED | OUTPATIENT
Start: 2021-08-29 | End: 2021-09-01 | Stop reason: HOSPADM

## 2021-08-29 RX ADMIN — TOPIRAMATE 100 MG: 100 TABLET, FILM COATED ORAL at 21:58

## 2021-08-29 RX ADMIN — THERA TABS 1 TABLET: TAB at 17:48

## 2021-08-29 RX ADMIN — ATORVASTATIN CALCIUM 10 MG: 10 TABLET, FILM COATED ORAL at 21:58

## 2021-08-29 RX ADMIN — TOPIRAMATE 100 MG: 100 TABLET, FILM COATED ORAL at 16:27

## 2021-08-29 RX ADMIN — LORAZEPAM 2 MG: 1 TABLET ORAL at 15:08

## 2021-08-29 RX ADMIN — HYDROCODONE BITARTRATE AND ACETAMINOPHEN 1 TABLET: 7.5; 325 TABLET ORAL at 21:30

## 2021-08-29 ASSESSMENT — PAIN SCALES - GENERAL
PAINLEVEL_OUTOF10: 6
PAINLEVEL_OUTOF10: 6
PAINLEVEL_OUTOF10: 7
PAINLEVEL_OUTOF10: 6

## 2021-08-29 ASSESSMENT — SLEEP AND FATIGUE QUESTIONNAIRES
AVERAGE NUMBER OF SLEEP HOURS: 3
DIFFICULTY STAYING ASLEEP: YES
DO YOU HAVE DIFFICULTY SLEEPING: YES
DIFFICULTY FALLING ASLEEP: YES
RESTFUL SLEEP: NO
DO YOU USE A SLEEP AID: YES
SLEEP PATTERN: DIFFICULTY FALLING ASLEEP;DISTURBED/INTERRUPTED SLEEP
DIFFICULTY ARISING: YES

## 2021-08-29 ASSESSMENT — PATIENT HEALTH QUESTIONNAIRE - PHQ9: SUM OF ALL RESPONSES TO PHQ QUESTIONS 1-9: 27

## 2021-08-29 NOTE — CARE COORDINATION
Approached pt to do admission. Pt lying in bed and continues to demand discharge. Nurse explained that she would see the doctor first thing tomorrow morning. \"No!  You get him on the phone right now! \"  \"If I wanted to end my life, I wouldn't tell anybody, I would just do it! \" Pt states she would lay on the railroad tracks to make sure she  because \"They can bring you back from an overdose. \"  Pt also said, \"If I want to end my life while I'm on this floor, there are ways to do it. \"  Asked pt how and she smiled, \"Oh, you think I'm going to tell you? \" in a sarcastic tone. Pt demanding to be let off the floor to smoke. Offered pt nicotine replacement and pt refused. Pt tearful and apologetic one moment, then hostile and insulting the next. Staff splitting also noted. Making negative comments about ER staff and praising this nurse, then was overheard telling another staff member that she was going to make sure this nurse was fired. Pt is labile and grandiose. Nurse explained PINK slip process and probate, per patient's request and pt said, \"I know how probate works! 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 \"  Pressured speech with FOI noted. Empathy was shown for pt multiple times about her many losses. Pt twists words around and hears what is not being communicated. Pt threatening to marcelo and said we must let her go because \"I want to go to a drug rehab. You can't stop me from doing that! \"  Asked pt what she was addicted to? \"I'm not addicted to anything\" in an angry tone. Then pt said, \"I think I'm on too many medications. \"  Pt reports seeing  Dr. Aury Bryant outpatient. Pt continues to escalate and jumps out of bed and storms down the fu. Pt boisterous and demanding discharge standing by the exit doors. SOS called and doctor notified of behavior and the fact she took a paint scraper off of housekeeping cart. 2 mg Ativan ordered.

## 2021-08-29 NOTE — CARE COORDINATION
Pt arrived on unit and refused to stand up from wheelchair. Pt demanding to see the doctor and be discharged. Pt wheeled to room and skin check was performed with 2 staff and was negative. 1:1 in room. Pt refused nicotine replacement and mood is unstable. Apologetic one moment, then irritable and sarcastic the next.

## 2021-08-29 NOTE — ED NOTES
Per Dr. Shira Niño make sure patient is upstairs at [phone] for him to evaluate.      Polina Bray RN  08/29/21 6966

## 2021-08-29 NOTE — PROGRESS NOTES
Per [facility] pt handed over the paint scraper when asked. Pt continues to be one to one for safety at this time.

## 2021-08-29 NOTE — PROGRESS NOTES
Was speaking to another patient on LEO. He was asking if this patient is ok. Informed him we couldn't release information about patient. This patient in [facility] stated she was talking to him earlier ([phone]), and she was telling him she plans to leave here and kill herself. He was very concerned, saying \"I've been threw this before, I know you can just lie and get out, Im very worried if she just lies and leaves in 3 days, you'll find her dead. \"    3w was called and updated.  Patient currently assigned 1:1.

## 2021-08-29 NOTE — ED NOTES
report given to [address] [place] [address] on [address] [place] [address] Str.  No Covid test done patient can go to [address] [place] [address]. pending covid results     Charlie Mar RN  08/28/21 5556

## 2021-08-29 NOTE — CARE COORDINATION
Report taken from [facility] in the [facility]. Pt is a 64year old female DX:  MDD. Past diagnosis of Bipolar Gaby. Presented to ED post overdose attempts. She states at 1130 this morning she purposely attempted to overdose on an old Klonopin and Remeron prescription, unsure of quantity. She states she has had many going stressors for years has tried many support groups and medications with no effect. She states she \"Just wanted it all to end\". Primary stressors at this time are loosing her best in 2014 to a heart attack, and he only son in 2017 to an opiod overdose. She states she never fully got over or recovered from either event despite trying. She admits her  is very supportive. She acknowledges if she had she been successful in her overdose, he would have been devastated, but appears very ambivalent. She denies any HI, or A/V hallucinations.     Covid -  Vitals WNL

## 2021-08-29 NOTE — ED NOTES
Patient resting quietly respirations even and unlabored, no distress at this time.      Justina Wilkerson RN  08/29/21 7982

## 2021-08-29 NOTE — ED NOTES
Patient up to the bathroom and still wanting to get out of here to go home and end it all     Reji Pineda, EDNA  08/28/21 1106

## 2021-08-29 NOTE — ED NOTES
Pt received call from . Pt had okayed to this writer acknowledgement of her call. Pt currently on the phone with . Pt given HIPPA Code. Myrna Vidal  [facility]  08/29/21 4835

## 2021-08-29 NOTE — BH NOTE
Patient behavior was very challenging and manipulative. She expressed being held unjustly and expressed intent to contact . Limits set that patient needed to take some time to regain control before using the phone or being out on the unit. Phone taken from her and security described rules and limits. She threatened to act out and be aggressive. Let patient know it was our job to keep her safe and care for her here. Attempts to redirect were met with challenges.

## 2021-08-29 NOTE — ED NOTES
Up to phone at this time for phone call. Irritable. Jose R Jordan, [facility]  08/29/21 9332

## 2021-08-29 NOTE — ED NOTES
Per Dr Narda Abdul, he will accept patient on 1:1. He will meet in team to see about another 1:1 being DC. Charge nurse will call when this is done. Dr Narda Abdul updated on patients change in affect and behavior.      Chikis Cha, RN  08/29/21 [facility] [address], RN  08/29/21 1427

## 2021-08-29 NOTE — BH NOTE
Records indicate that patient does not have a Living Will or DPOA on file. Will ask staff to follow up.

## 2021-08-29 NOTE — PROGRESS NOTES
Pt has been agitated and pacing floor. Pt stated she was going to leave and no one was going to stop her. While housekeeping was cleaning patients room Pt grabbed a paint scraper off of the housekeeping cart. Pt stated she was going to use it to break out of window.

## 2021-08-29 NOTE — ED NOTES
Patient very angry with this nurse about still being here. Swearing, demanding to leave. She states an admission wont help her, and she \"could just as easily kill myself\" on 3w or home. She states she will marcelo. She was explained pink slip. She denied ever trying to kill herself, despite multiple statements to this nurse and doctor last night. She is very labile, swearing at this nurse, and threatening to file a complaint. She was offered a copy of pink slip in the interim, and declined. She was given this nurses name (for complaint), when this nurse wouldn't give her his last name, informing her I was the only nurse with this name in behavioral health, she started \"I know people here, Ill just get it form them\".          Leeann Duarte RN  08/29/21 7586

## 2021-08-29 NOTE — PROGRESS NOTES
Pt. presents initially sarcastic and irritable. Was more relaxed as session continued. Reports feeling down and out and took clonopin and other medications \"wanting to sleep. \"  Denies any suicidal intentions. Reports feeling down and out due to PTSD and complicated grief. Has had many losses, including son and brother in same year. Reports \"nothing helps me feel better. I've tried meds, done counseling, nothing works. \" Suggestions offered, but is not open or hopeful that anything will help her feel better. Reports good relationship with , however, would rather live alone as he is \"always around and just doesn't get it. \"  Has supportive friends but has withdrawn and no longer socializes much. Low motivation. Anhedonia. Denies AH/VH. Reports knowing \"that laying across train tracks is a sure way to commit  suicide but taking pills you can be revived. \"  Denies drinking ETOH and does not use any drugs. [facility] cord [facility] and pain.  Stressors include  1.ongoing complicated grief  2.not being able to enjoy or get any chasity out of anything I do  *used to be socially active, hanging out with friends, and used to love boating   Electronically signed by Rafal Lerner on 8/29/2021 at 12:22 PM

## 2021-08-29 NOTE — CONSULTS
[facility] MEDICINE    HISTORY AND PHYSICAL EXAM    PATIENT NAME:  Say Cantu    MRN:  [id number]  SERVICE DATE:  8/29/2021   SERVICE TIME:  1:03 PM    Primary Care Physician: RINA Ochoa CNP         SUBJECTIVE  CHIEF COMPLAINT:  Medically appropriate for inpatient psychiatry admission. Consult for medical H/P encounter. HPI:  This is a 64 y.o. female with PMHx of chronic pain syndrome, HLD, PTSD, HTN, depression, opioid dependency who presented to emergency room with SI with intentional drug overdose. Patient states she took 5- 1mg Klonopin, 11-7.5mg Mirtazapine, 2- 10mg Propanolol, Atorvastatin 10mg. Patient was monitored in behavioral health ED overnight and medically cleared from emergency room for psychiatric care. Patient Seen, Chart, Labs, Radiologystudies, and Consults reviewed. Patient denies headache, chest pain, shortness of breath, N/V/D/C, fever/chills.        PAST MEDICAL HISTORY:    Past Medical History:   Diagnosis Date    Anxiety     Depression     Hyperlipidemia     Hypopotassemia     Lumbago     Osteopenia     PTSD (post-traumatic stress disorder)     Pulmonary embolism (HCC)     4 PE in bilat lungs    Spinal stenosis     congenital     PAST SURGICAL HISTORY:    Past Surgical History:   Procedure Laterality Date    HYSTERECTOMY      SPINE SURGERY      cervical x 4, fusions and reconstruction    TONSILLECTOMY      age 3     FAMILY HISTORY:    Family History   Problem Relation Age of Onset    Celiac Disease Neg Hx     Crohn's Disease Neg Hx      SOCIAL HISTORY:    Social History     Socioeconomic History    Marital status:      Spouse name: Becki Cobb    Number of children: 1    Years of education:  15    Highest education level: Associate degree: occupational, technical, or vocational program   Occupational History    Occupation: On disability     Comment: Used to work as a  and did odd jobs   Tobacco Use    Smoking status: Current and Tetracyclines & related    REVIEW OF SYSTEM:   ROS as noted in HPI, 12 point ROS reviewed and otherwise negative. OBJECTIVE  PHYSICAL EXAM: /76   Pulse 65   Temp 98.1 °F (36.7 °C) (Oral)   Resp 16   Ht 5' 3\" (1.6 m)   Wt 125 lb (56.7 kg)   SpO2 98%   BMI 22.14 kg/m²   CONSTITUTIONAL:  awake, alert, cooperative, no apparent distress, and appears stated age  EYES:  Lids and lashes normal, pupils equal, round and reactive to light, extra ocular muscles intact, sclera clear, conjunctiva normal  ENT:  Normocephalic, without obvious abnormality, atraumatic, sinuses nontender on palpation, external ears without lesions, oral pharynx with moist mucus membranes, tonsils without erythema or exudates, gums normal and good dentition. NECK:  Supple, symmetrical, trachea midline, no adenopathy, thyroid symmetric, not enlarged and no tenderness, skin normal  LUNGS:  No increased work of breathing, good air exchange, clear to auscultation bilaterally, no crackles or wheezing  CARDIOVASCULAR:  Normal apical impulse, regular rate and rhythm, normal S1 and S2, no S3 or S4, and no murmur noted  ABDOMEN:  No scars, normal bowel sounds, soft, non-distended, non-tender, no masses palpated, no hepatosplenomegally  MUSCULOSKELETAL:  There is no redness, warmth, or swelling of the joints. Full range of motion noted. Motor strength is 5 out of 5 all extremities bilaterally. Tone is normal.  NEUROLOGIC:  Awake, alert, oriented to name, place and time. Cranial nerves II-XII are grossly intact. Motor is 5 out of 5 bilaterally. Sensory is intact.  gait is normal.  SKIN:  no bruising or bleeding, normal skin color, texture, turgor, no redness, warmth, or swelling and no jaundice    DATA:     Diagnostic tests reviewed for today's visit:    Most recent labs and imaging results reviewed.      VTE Prophylaxis:     ASSESSMENT AND PLAN  Active Problems:    Major depressive disorder, recurrent severe without psychotic features [facility])  Plan: Patient admitted to behavorial health for evaluation and treatment     HLD: atorvastatin    CPS: norco, Topamax and pain management consult. HTN: stable without medications as this time. This is only a history and physical examination and not medical management. The patient is to contact and follow up with their primary care physician and go over any abnormal labs, imaging, findings, medical concerns, or conditions that we have and have not addressed during this encounter.     Plan of care discussed with: patient    SIGNATURE: RINA Mancilla CNP  DATE: August 29, 2021  TIME: 1:03 PM

## 2021-08-29 NOTE — ED NOTES
Per patient request, her OP provider (Dr Daniel Lenz) was notified of patient being in ED with HIPAA complaint message.      Odalis Spann RN  08/29/21 9804

## 2021-08-29 NOTE — ED NOTES
Patient expressed continued \"want to end her life\". Patient expressed she is now \"out of pills\", but still has a desire to \"end her life before her mother or dog dies and she has to go through that\". Patient is accepting of transfer now and is aware she will be here for up to three days.         EDNA Robbins RN  08/28/21 6535
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
## 2021-08-30 PROBLEM — T42.4X2A SUICIDE ATTEMPT BY BENZODIAZEPINE OVERDOSE (HCC): Status: ACTIVE | Noted: 2021-08-30

## 2021-08-30 PROBLEM — G89.4 CHRONIC PAIN ASSOCIATED WITH SIGNIFICANT PSYCHOSOCIAL DYSFUNCTION: Status: ACTIVE | Noted: 2021-08-30

## 2021-08-30 LAB
EKG ATRIAL RATE: 54 BPM
EKG ATRIAL RATE: 55 BPM
EKG P AXIS: 60 DEGREES
EKG P AXIS: 74 DEGREES
EKG P-R INTERVAL: 158 MS
EKG P-R INTERVAL: 162 MS
EKG Q-T INTERVAL: 438 MS
EKG Q-T INTERVAL: 442 MS
EKG QRS DURATION: 86 MS
EKG QRS DURATION: 86 MS
EKG QTC CALCULATION (BAZETT): 415 MS
EKG QTC CALCULATION (BAZETT): 422 MS
EKG R AXIS: 78 DEGREES
EKG R AXIS: 83 DEGREES
EKG T AXIS: 78 DEGREES
EKG T AXIS: 86 DEGREES
EKG VENTRICULAR RATE: 54 BPM
EKG VENTRICULAR RATE: 55 BPM

## 2021-08-30 PROCEDURE — 6370000000 HC RX 637 (ALT 250 FOR IP): Performed by: PSYCHIATRY & NEUROLOGY

## 2021-08-30 PROCEDURE — 99223 1ST HOSP IP/OBS HIGH 75: CPT | Performed by: PSYCHIATRY & NEUROLOGY

## 2021-08-30 PROCEDURE — 1240000000 HC EMOTIONAL WELLNESS R&B

## 2021-08-30 PROCEDURE — 6370000000 HC RX 637 (ALT 250 FOR IP): Performed by: NURSE PRACTITIONER

## 2021-08-30 PROCEDURE — 93010 ELECTROCARDIOGRAM REPORT: CPT | Performed by: INTERNAL MEDICINE

## 2021-08-30 RX ORDER — ACETAMINOPHEN 325 MG/1
650 TABLET ORAL EVERY 6 HOURS PRN
Status: DISCONTINUED | OUTPATIENT
Start: 2021-08-30 | End: 2021-09-01 | Stop reason: HOSPADM

## 2021-08-30 RX ORDER — CARVEDILOL 12.5 MG/1
12.5 TABLET ORAL 2 TIMES DAILY
Status: DISCONTINUED | OUTPATIENT
Start: 2021-08-30 | End: 2021-09-01 | Stop reason: HOSPADM

## 2021-08-30 RX ORDER — ASPIRIN 81 MG/1
81 TABLET ORAL DAILY
Status: DISCONTINUED | OUTPATIENT
Start: 2021-08-30 | End: 2021-08-30

## 2021-08-30 RX ADMIN — ATORVASTATIN CALCIUM 10 MG: 10 TABLET, FILM COATED ORAL at 20:42

## 2021-08-30 RX ADMIN — CARVEDILOL 12.5 MG: 12.5 TABLET, FILM COATED ORAL at 20:42

## 2021-08-30 RX ADMIN — TOPIRAMATE 100 MG: 100 TABLET, FILM COATED ORAL at 08:40

## 2021-08-30 RX ADMIN — THERA TABS 1 TABLET: TAB at 08:40

## 2021-08-30 RX ADMIN — HYDROCODONE BITARTRATE AND ACETAMINOPHEN 1 TABLET: 7.5; 325 TABLET ORAL at 20:42

## 2021-08-30 RX ADMIN — CARVEDILOL 12.5 MG: 12.5 TABLET, FILM COATED ORAL at 10:30

## 2021-08-30 RX ADMIN — HYDROCODONE BITARTRATE AND ACETAMINOPHEN 1 TABLET: 7.5; 325 TABLET ORAL at 08:40

## 2021-08-30 ASSESSMENT — PAIN SCALES - GENERAL
PAINLEVEL_OUTOF10: 7
PAINLEVEL_OUTOF10: 6
PAINLEVEL_OUTOF10: 3
PAINLEVEL_OUTOF10: 3

## 2021-08-30 ASSESSMENT — PATIENT HEALTH QUESTIONNAIRE - PHQ9: SUM OF ALL RESPONSES TO PHQ QUESTIONS 1-9: 27

## 2021-08-30 ASSESSMENT — LIFESTYLE VARIABLES: HISTORY_ALCOHOL_USE: NO

## 2021-08-30 NOTE — CARE COORDINATION
Family/Support Name: Nivia Cantu #: [phone]  Relationship to Patient:     Placed call to above for collateral information,    Response:    No answer. Left HIPPA compliant voicemail requesting return call.

## 2021-08-30 NOTE — CARE COORDINATION
Brief Intervention and Referral to Treatment Summary    Patient was provided PHQ-9, AUDIT and DAST Screening:      PHQ-9 Score: 27  AUDIT Score: 0   DAST Score: 0     Patients substance use is considered     Low Risk/Healthy x  Moderate Risk  Harmful  Dependent    Patients depression is considered:     Minimal  Mild   Moderate  Moderately Severe  Severe x    Brief Education Was Provided N/A    Patient was receptive  Patient was not receptive      Brief Intervention Is Provided (Only for AUDIT or DAST) N/A    Patient reports readiness to decrease and/or stop use and a plan was discussed   Patient denies readiness to decrease and/or stop use and a plan was not discussed      Recommendations/Referrals for Brief and/or Specialized Treatment Provided to Patient   Patient denied any past or present issues with drugs or alcohol. As a result, no brief education or intervention was completed.     Electronically signed by Argenis May on 8/30/2021 at 1:31 PM

## 2021-08-30 NOTE — PROGRESS NOTES
0230- Pt resting quietly. 1:1 in place    0330- Pt resting, eyes closed, respirations even and unlabored. 1:1 maintained. 0430- Pt resting quietly. No distress noted. 1:1 for safety    0530- Pt remains resting quietly. 1:1 for safety    0630- Pt resting, eyes closed. 1:1 maintained.

## 2021-08-30 NOTE — GROUP NOTE
Group Therapy Note    Date: 8/30/2021    Group Start Time: 1400  Group End Time: 1430  Group Topic: Cognitive Skills    ML 3W I    VLADIMIR Kelsey        Group Therapy Note    Attendees: 5         Patient's Goal:  To participate in mood management group. Notes: Patient learned about the wellness mind and the emotional mind. Status After Intervention:  Unchanged    Participation Level: Active Listener    Participation Quality: Appropriate      Speech:  normal      Thought Process/Content: Logical      Affective Functioning: Congruent      Mood: angry      Level of consciousness:  Alert      Response to Learning: Progressing to goal      Endings: None Reported    Modes of Intervention: Education      Discipline Responsible: /Counselor      Signature:   VLADIMIR Kelsey

## 2021-08-30 NOTE — PROGRESS NOTES
Pt asks for and is given Norco for back pain and neck pain.  Electronically signed by Tatianna Scott LPN on 6/78/3499 at 7:11 AM

## 2021-08-30 NOTE — PROGRESS NOTES
Pt. attended the 0900 community meeting. Electronically signed by [place], [address] on 8/30/2021 at 11:36 AM

## 2021-08-30 NOTE — H&P
[address] [place] [address] - Department of Psychiatry    History and Physical - Adult         CHIEF COMPLAINT:  Depression SI    History obtained from:  patient    Patient was seen after discussing with the treatment team and reviewing the chart        CIRCUMSTANCES OF ADMISSION:     64year old female presented to ED post overdose attempts. She states at 1130 this morning she purposely attempted to overdose on an old Klonopin and Remeron prescription, unsure of quantity. She states she has had many going stressors for year,s has tried many support groups and medications, and has had no effect. She states she \"Just wanted it all to end\". Primary stressors at this time are loosing her best in  to a heart attack, and he only son in 2017 to an opiod overdose. She states she never fully got over or recovered from either event, despite trying. She admits her  is very supportive. She acknowledges had she been successful in her overdose, he would have been devastated, but appears very ambivalent. She denies any HI, or A/V hallucinations. HISTORY OF PRESENT ILLNESS:      The patient is a 64 y.o. female with significant past history of MDD recurrent, see Dr Linette Baxter as OP. Pt live with her , no living children, son  4 years ago from drug addiction overdose. She lost her best friend - knew her for 37 years. Brother  9 weeks after son death. Dad  7 weeks ago. Pt still grieve the loss of her son and best friend, when the news of dad death hot her. Pt was feeling depressed and anxious, suicidal thoughts.   Severity: Rating mood to be around 3/10 (10- good)  Quality:melancholic  Worse in the morning  Content: Hopeless, worthless and helpless feeling  Suicidal thoughts - want to take remeron and klonopin overdose  Associated symptoms:  Poor concentration, anhedonia, decrease motivation  Sleep and appetite- poor  Took klonopin x 4 tab from the old prescription, 3 tab of remeron from old bottle  Pt called her best friend who called the EMS  Pt denies that this was an suicidal attempt      The patient is currently receiving care for the above psychiatric illness. Medications Prior to Admission:   Medications Prior to Admission: VORTIoxetine (TRINTELLIX) 10 MG TABS tablet, Take 1 tablet by mouth daily  amitriptyline (ELAVIL) 100 MG tablet, Take 1 tablet by mouth nightly  carvedilol (COREG) 12.5 MG tablet, Take 1 tablet by mouth 2 times daily  aspirin EC 81 MG EC tablet, Take 1 tablet by mouth daily  topiramate (TOPAMAX) 100 MG tablet, Take 1 tablet by mouth 2 times daily  atorvastatin (LIPITOR) 10 MG tablet, Take 1 tablet by mouth daily  Multiple Vitamin (MULTI-VITAMIN DAILY PO), Take by mouth  Wheat Dextrin (BENEFIBER) POWD, Take 4 g by mouth 3 times daily (with meals)    Compliance:yes    Psychiatric Review of Systems       Depression: yes     Gaby or Hypomania:  no     Panic Attacks:  yes      Phobias:  no     Obsessions and Compulsions:  no     PTSD : no     Hallucinations:  no     Delusions:  no    Substance Abuse History:  ETOH: no   Marijuana: no  Opiates: no  Other Drugs: no      Past Psychiatric History:  Prior Diagnosis:  Major depressive disorder; recurrent  Psychiatrist: Dr Linette Baxter  Therapist:yes  Hospitalization: yes  Hx of Suicidal Attempts: no  Hx of violence:  no  ECT: no    Previous discontinued Psychiatric Med Trials: From Dr Linette Baxter record    DATE and changes made  · 6/2/21  · Previous medications   ? lexapro, abilify, effexor, wellbutrin previously, now on Cymbalta 60 mg   · 7/2/20   ? cymbalta was tapered and stopped. Was going to start wellbutrin  · 7/17/20 (between appts)  ? Patient actually started the wellbutrin on her own  · 7/22/20  ? No changes as patient has only been on wellbutrin a few days so far  · 8/10/20  ? Currently on 75 mg, Amitriptylline 100 mg QHS (after current taper has completed)  · 8/13/20  ?  Pt is still on elavil 75 mg QHS, will increase to 100 mg in several more days and is doing well, improved depression and GI symptoms on this   · 9/2/20  ? No side effects from elavil and doing well at 100 mg QHS   ? Propranolol 10 mg TID PRN anxiety has been helful and is using as needed   · 10/7  ? Rec. Decreasing topamax as this may be worsening daytime sedation   ? Patient will take 100 mg QHS   ? Then continue amitryptylline   · 11/24  ? Patient having worsening mood, has not been on her pain medications 2/2 discharged from practice as she had been getting medical mj from the dispensary   ? Patient will take amitriptylline 100 mg INCREASE to 125 mg QHS for one week, then to 150 mg QHS after that   · 12/15  ? Patient taking 150 mg QHS of elavil   § Helps with sleep, irritable bowel, less diarrhea, less pain, but not helping with motivation  § Elavil 100 mg QHS for sleep primarily (a decrease)  ? lexapro 5 mg QD   · 1/26/21  ? Lexapro 5 mg QD continue   ? Continue Elavil 100 mg QHS   ? No changes today  · 2/15  ? Elavil 100 mg QHS   ? START CYTOMEL 25 mcg   § CYTOMEL may be increased in 2 weeks to 50 mcg qd as needed and tolerated  § Augment for depression   § Is on multivitamin with calcium and vitamin D   ? Continue Lexapro  · 3/3/21  ? Amitriptyline 100 mg QHS   ? Lexapro 5 mg QD   ? Topamax 100 mg BID   ? INCREASE Cytomel 25 mcg QD to 50 mcg   · 4/7/21  ? CONTINUE liothyronine (Cytomel) 50 mcg which is being used as an augmentation to her antidepressant will decrease back to 25 mcg, patient reporting tremor, patient is reticent to get labwork due to fear of needles, but agrees to get thyroid labs to monitor this   ? Calcium and vitamin D started   ? IN ONE WEEK, will stop escitalopram (Lexapro) 5 mg   ?  Consider augmentation with low dose methylphenidate (Ritalin) to address continue difficulty with motivation, however, given age will obtain EKG prior to this and will recommend cardiology referral for any abnormalities, especially with the concurrent medications of a tricyclic Depression     Hyperlipidemia     Hypopotassemia     Lumbago     Osteopenia     PTSD (post-traumatic stress disorder)     Pulmonary embolism (HCC)     4 PE in bilat lungs    Spinal stenosis     congenital       Past Surgical History:        Procedure Laterality Date    HYSTERECTOMY      SPINE SURGERY      cervical x 4, fusions and reconstruction    TONSILLECTOMY      age 3       Allergies:   Latex, Fentanyl, Ketoprofen, Morphine, Nsaids, Tetracycline, Cefaclor, Cymbalta [duloxetine hcl], and Tetracyclines & related    Family History  Family History   Problem Relation Age of Onset    Celiac Disease Neg Hx     Crohn's Disease Neg Hx          Social History:  Born and Raised: [place]  Describes Childhood:   supportive  Education: [employer]  Employment: Disabled  Relationships:   Children: no living children  Current Support: romantic partner    Legal Hx: none  Access to weapons?:  No      EXAMINATION:    REVIEW OF SYSTEMS:    ROS:  [x] All negative/unchanged except if checked.  Explain positive(checked items) below:  [] Constitutional  [] Eyes  [] Ear/Nose/Mouth/Throat  [] Respiratory  [] CV  [] GI  []   [] Musculoskeletal  [] Skin/Breast  [] Neurological  [] Endocrine  [] Heme/Lymph  [] Allergic/Immunologic    Explanation:     Vitals:  BP (!) 135/95   Pulse 110   Temp 97.7 °F (36.5 °C)   Resp 18   Ht 5' 3\" (1.6 m)   Wt 125 lb (56.7 kg)   SpO2 100%   BMI 22.14 kg/m²      Neurologic Exam:   Muscle Strength & Tone: full ROM  Gait: normal gait   Involuntary Movements: No    Mental Status Examination:    Level of consciousness:  within normal limits   Appearance:  ill-appearing  Behavior/Motor:  psychomotor retardation  Attitude toward examiner:  cooperative  Speech:  slow   Mood: decreased range and depressed  Affect:  blunted  Thought processes:  goal directed   Thought content:  Suicidal Ideation:  Minimizing the suicidal attempt  Delusions:  no evidence of delusions  Perceptual Disturbance: denies any perceptual disturbance  Cognition:  oriented to person, place, and time   Concentration poor  Memory intact  Insight poor   Judgement poor   Fund of Knowledge adequate    Mini Mental Status 30/30      DIAGNOSIS:     MDD recurrent severe without psychosis   DEBI      RISK ASSESSMENT:    SUICIDE RISK ASSESSMENT: high  HOMICIDE: low  AGITATION/VIOLENCE: low  ELOPEMENT: low    LABS: REVIEWED TODAY:  Recent Labs     08/28/21  1500   WBC 12.5*   HGB 16.0        Recent Labs     08/28/21  1500      K 5.1*      CO2 25   BUN 9   CREATININE 0.62   GLUCOSE 85     Recent Labs     08/28/21  1500   BILITOT 0.5   ALKPHOS 109   AST 41*   ALT 32     Lab Results   Component Value Date    LABAMPH Neg 08/28/2021    LABAMPH Neg 08/28/2021    BARBSCNU Neg 08/28/2021    BARBSCNU Neg 08/28/2021    LABBENZ Neg 08/28/2021    LABBENZ POSITIVE 08/28/2021    LABMETH Neg 08/28/2021    LABMETH Neg 08/28/2021    OPIATESCREENURINE Neg 08/28/2021    OPIATESCREENURINE POSITIVE 08/28/2021    PHENCYCLIDINESCREENURINE Neg 08/28/2021    PHENCYCLIDINESCREENURINE Neg 08/28/2021    ETOH <10 08/28/2021     Lab Results   Component Value Date    TSH 1.410 08/28/2021     No results found for: LITHIUM  No results found for: VALPROATE, CBMZ  No results found for: LITHIUM, VALPROATE    FURTHER LABS ORDERED :      Radiology   No results found. EKG: TRACING REVIEWED    TREATMENT PLAN:    Risk Management:  close watch and suicide risk    Collateral Information:  Will obtain collateral information from the family or friends. Will obtain medical records as appropriate from out patient providers  Will consult the hospitalist for a physical exam to rule out any co-morbid physical condition. Home medication Reconciled       New Medications started during this admission :    See orders  Prn Haldol 5mg and Vistaril 50mg q6hr for extreme agitation.   Trazodone as ordered for insomnia  Vistaril as ordered for anxiety  Discussed with the patient risk, benefit, alternative and common side effects for the  proposed medication treatment. Patient is consenting to the treatment.     Psychotherapy:   Encourage participation in milieu and group therapy  Individual therapy as needed      Electronically signed by Viridiana Tipton MD on 8/30/2021 at 10:01 AM

## 2021-08-30 NOTE — GROUP NOTE
Group Therapy Note    Date: 8/29/2021    Group Start Time: 1930  Group End Time: 2000  Group Topic: Recreational    [facility]    Dayna Edwards        Group Therapy Note    Attendees: 8       Patient's Goal:  Attend group    Notes:  Goal met    Status After Intervention:  Unchanged    Participation Level:  Active Listener    Participation Quality: Appropriate, Attentive, Sharing and Supportive      Speech:  normal      Thought Process/Content: Logical      Affective Functioning: Congruent      Mood: euthymic      Level of consciousness:  Alert, Oriented x4 and Attentive      Response to Learning: Progressing to goal      Endings: None Reported    Modes of Intervention: Socialization      Discipline Responsible: Behavorial Health Tech      Signature:  Dayna Edwards

## 2021-08-30 NOTE — CARE COORDINATION
8/30/21 @ 0957    Patient was approached by this writer to complete care plan. Patient began saying she does not have a goal and that she does not need to be here. Patient is tangential and talks of her friend who is employed on [employer]. Patient claims the employee is \"the reason I'm here\" and that the employee is the person who called EMS on patient. Patient voices anger and confusion about the situation. Patient reported her friend was \"overreacting. \" At times she says, Cayla Silva would she do that? \" and at others says, \"I would've done the same thing. \" Patient denies this as a SA and stated she just wanted to go to sleep. She also stated, \"If I wanted to do it, I would've by now. \" Patient said if she were actually going to commit suicide, she wouldn't have told anybody about it. Patient repeatedly states she does not need to be here and that it will only make things worse.     Electronically signed by Karime Encinas on 8/30/2021 at 12:45 PM

## 2021-08-30 NOTE — FLOWSHEET NOTE
This nurse greeted pt and pt will not make eye contact. Rolls eyes when asked if I could do assessment on her. Pt replied, \"you can come back later. \"

## 2021-08-30 NOTE — PROGRESS NOTES
Behavioral Services  Medicare Certification Upon Admission    I certify that this patient's inpatient psychiatric hospital admission is medically necessary for:    [x] (1) Treatment which could reasonably be expected to improve this patient's condition,       [x] (2) Or for diagnostic study;     AND     [x](2) The inpatient psychiatric services are provided while the individual is under the care of a physician and are included in the individualized plan of care.     Estimated length of stay/service 3-5    Plan for post-hospital care OP care    Electronically signed by Julio Batista MD on 8/30/2021 at 10:01 AM

## 2021-08-30 NOTE — PROGRESS NOTES
2330- Assumed care of pt. Pt is resting quietly. 1:1 in place    0030- Pt remains resting, eyes closed, no distress noted. 1:1 for safety    0130- Pt up to the restroom and returns to bed.  1:1 maintained

## 2021-08-30 NOTE — PROGRESS NOTES
Patient continues on the 1:1 for risk of suicide for patient safety. Patient is polite and cooperative with breakfast and medications. Patient able to make needs known. Patient is asking about her home meds. Asa Pelayo RN is aware.  Electronically signed by Ki Vincent LPN on 5/11/4898 at 7:19 AM

## 2021-08-30 NOTE — GROUP NOTE
Group Therapy Note    Date: 8/29/2021    Group Start Time: 2000  Group End Time: 2015  Group Topic: Wrap-Up    [facility]    Dayna Edwards        Group Therapy Note    Attendees: 8       Patient's Goal:  To have a better day    Notes:      Status After Intervention:  Unchanged    Participation Level:  Active Listener and Interactive    Participation Quality: Appropriate, Attentive, Sharing and Supportive      Speech:  normal      Thought Process/Content: Logical      Affective Functioning: Congruent      Mood: euthymic      Level of consciousness:  Alert, Oriented x4 and Attentive      Response to Learning: Progressing to goal      Endings: None Reported    Modes of Intervention: Support      Discipline Responsible: Behavorial Health Tech      Signature:  Dayna Edwards

## 2021-08-30 NOTE — CARE COORDINATION
BHI Biopsychosocial Assessment    Current Level of Psychosocial Functioning     Independent   Dependent    Minimal Assist x    Comments:  Patient lives with her  and is an active volunteer. She receives some government assistance to maintain a reasonable quality of life. Psychosocial High Risk Factors (check all that apply)    Unable to obtain meds   Chronic illness/pain    Substance abuse   Lack of Family Support   Financial stress   Isolation   Inadequate Community Resources  Suicide attempt(s)  Not taking medications   Victim of crime   Developmental Delay  Unable to manage personal needs    Age 72 or older   Homeless  No transportation   Readmission within 30 days  Unemployment  Traumatic Event    Comments: Patient did not indicated any high risk factors associated with this admission. Psychiatric Advanced Directives: None Reported. Family to Involve in Treatment: Patient provided her 's contact information to completed collateral.     Sexual Orientation:  Patient is currently in a heterosexual relationship. Patient Strengths: Patient was honest and forthcoming. Patient Barriers: None Identified. Opiate Education Provided:  N/A    CMHC/mental health history: Patient receives outpatient mental health services. Plan of Care   medication management, group/individual therapies, family meetings, psycho -education, treatment team meetings to assist with stabilization    Initial Discharge Plan:  Patient will return home and follow the recommendations of the treatment team.      Clinical Summary:    Patient is a 64year old female who was admitted to the [facility] due to suicidal ideation. Reportedly, patient made an attempt with an intentional overdose on prescription medicdation. When interviewed, patient was cooperative and engaging. She seemed remorseful about the circumstances that led to this hospitalization.  Patient was forthcoming about her history of depression, PTSD, and grief. She stated she lost her only child some years ago and to follow she lost other family members. Patient shared early childhood trauma as her father was alcoholic and seemed to neglect his family due to his alcoholism. Patient is currently  and spends time volunteering. She cannot work due to being on disability.      Electronically signed by Sinan Johnson on 8/30/2021 at 1:41 PM

## 2021-08-30 NOTE — PROGRESS NOTES
Pt. refused to attend the 1000 skills group, despite staff encouragement. Electronically signed by Mohinder Bryant, [address] on 8/30/2021 at 11:50 AM

## 2021-08-30 NOTE — FLOWSHEET NOTE
Pt reports feeling \"much better\" after showering. Pt social with peers. Pt has sad, flat affect. Eye contact improved through day. Pt reports good sleep. Pt reports good appetite. Pt denies SI/HI/AVH at this time.

## 2021-08-30 NOTE — PROGRESS NOTES
Hospitalist Progress Note      PCP: Alan Byrd, APRN - CNP    Date of Admission: 8/28/2021    Patient seen and examined on follow-up at request of nursing. Reports some of her home medications not resumed. At time of exam, patient is calm, cooperative. She is seen with nursing staff present, 1:1 suicide precautions. She denies headache or dizziness. No chest pain or shortness of breath. She has chronic neck pain as well as RUE and RLE spasticity with history of multiple cervical spine surgeries. No abdominal pain or nausea. Tolerating PO intake without difficulty. The patient requests her Coreg, Topamax, Norco, and Trintellix. She also indicates that she does not take daily ASA as indicated in her home medication list. Home med list updated. Will defer psychiatric medication regimen to primary team.     ROS: 10 point review of systems was obtained and all are negative unless noted below or in the HPI. Medications:  Reviewed    Infusion Medications   Scheduled Medications    carvedilol  12.5 mg Oral BID    aspirin EC  81 mg Oral Daily    atorvastatin  10 mg Oral Daily    multivitamin  1 tablet Oral Daily    topiramate  100 mg Oral BID    nicotine  1 patch Transdermal Daily     PRN Meds: HYDROcodone-acetaminophen    No intake or output data in the 24 hours ending 08/30/21 0944    Exam:    BP (!) 135/95   Pulse 110   Temp 97.7 °F (36.5 °C)   Resp 18   Ht 5' 3\" (1.6 m)   Wt 125 lb (56.7 kg)   SpO2 100%   BMI 22.14 kg/m²     General appearance: No apparent distress, appears stated age and cooperative. HEENT: Pupils equal, round, and reactive to light. Conjunctivae/corneas clear. Neck: Supple, with full range of motion. No jugular venous distention. Trachea midline. Respiratory:  Normal respiratory effort. Clear to auscultation, bilaterally without Rales/Wheezes/Rhonchi. Cardiovascular: Regular rate and rhythm with normal S1/S2 without murmurs, rubs or gallops.   Abdomen: Soft, non-tender, non-distended with normal bowel sounds. Musculoskeletal: No clubbing, cyanosis or edema bilaterally. Right upper and lower extremity spasticity, baseline  Skin: Skin color, texture, turgor normal.  No rashes or lesions. Neuro: Non Focal. Symetrical motor and tone. Nl Comprehension, Alert,awake and oriented. NL CN. Symetrical tone and reflexes. Psychiatric: Alert and oriented, thought content appropriate, normal insight  Capillary Refill: Brisk,< 3 seconds   Peripheral Pulses: +2 palpable, equal bilaterally       Labs:   Recent Labs     08/28/21  1500   WBC 12.5*   HGB 16.0   HCT 46.5        Recent Labs     08/28/21  1500      K 5.1*      CO2 25   BUN 9   CREATININE 0.62   CALCIUM 9.9     Recent Labs     08/28/21  1500   AST 41*   ALT 32   BILITOT 0.5   ALKPHOS 109     No results for input(s): INR in the last 72 hours. Recent Labs     08/28/21  1500   CKTOTAL 67       Urinalysis:      Lab Results   Component Value Date    NITRU Negative 08/28/2021    WBCUA 0-2 06/19/2019    BACTERIA Rare 06/19/2019    RBCUA 0-2 06/19/2019    BLOODU Negative 08/28/2021    SPECGRAV 1.006 08/28/2021    GLUCOSEU Negative 08/28/2021       Radiology:  No orders to display           Assessment/Plan:    Active Hospital Problems    Diagnosis Date Noted    Major depressive disorder, recurrent severe without psychotic features ([facility] [address] [F33.2] 08/28/2021     1. Major depressive disorder  2. Suicide attempt by intentional overdose  -Further POC for behavioral health therapy, psychiatric medication regimen per primary behavioral health team    3. Hypertension  -Continue home carvedilol    4. Chronic pain syndrome  -Continue home Norco, Topamax  -Pain management has been consulted    5. Hyperlipidemia  -Atorvastatin      Additional work up or/and treatment plan may be added today or then after based on clinical progression. I am managing a portion of pt care.  Some medical issues are handled by other specialists. Additional work up and treatment should be done in out pt setting by pt PCP and other out pt providers. Diet: ADULT DIET;  Regular    Code Status: Full Code    PT/OT Eval           Electronically signed by RINA Soriano CNP on 8/30/2021 at 9:44 AM

## 2021-08-30 NOTE — PROGRESS NOTES
Took over 1:1 for this patient. Patient is currently sleeping with even non-labored respirations. Will continue to monitor patient for any changes.

## 2021-08-30 NOTE — FLOWSHEET NOTE
This writer approached pt to give her medications. Pt was  sarcastic. Pt wanted to keep her night medications on her chair in the dayroom. This writer explained that I had to be there when she took them. Pt was demanding that she keeps them there . This writer asked for    the medications and returned them to the [address] [place] [address] and told pt to please let this writer know when she is ready to take them . Pt  was agitated and walked to her room and slammed her door. This writer let the pt know we are here to keep her safe and to care for her. Will continue to monitor .

## 2021-08-30 NOTE — SUICIDE SAFETY PLAN
SAFETY PLAN    A suicide Safety Plan is a document that supports someone when they are having thoughts of suicide. Warning Signs that indicate a suicidal crisis may be developing: What (situations, thoughts, feelings, body sensations, behaviors, etc.) do you experience that lets you know you are beginning to think about suicide? 1. Depression  2. Anxiety  3. Grief    Internal Coping Strategies:  What things can I do (relaxation techniques, hobbies, physical activities, etc.) to take my mind off my problems without contacting another person? 1. Boating  2. Reading  3. Watch Tv    People and social settings that provide distraction: Who can I call or where can I go to distract me? 1. Name: Chinlien Howell Phone: [phone]  2. Name: Dr. Young Mediate Phone: unsure   3. Place: Vacationing            4. Place: Boating on the [address] [place] [address] whom I can ask for help: Who can I call when I need help - for example, friends, family, clergy, someone else? 1. Name: Nimble        Phone: [phone]  2. Name: Howard Llamas Phone: [phone]  3. Name: Dr. Young Mediate Phone: unsure    Professionals or [facility] agencies I can contact during a crisis: Who can I call for help - for example, my doctor, my psychiatrist, my psychologist, a mental health provider, a suicide hotline? 1. Clinician Name: Dr. Young Mediate   Phone: unsure      Clinician Pager or Emergency Contact #: [phone]    1. Suicide Prevention Lifeline: 7-301-154-TALK (5004)    3. [address] [place] [address] Emergency Services -  for example, [facility] suicide hotline, [facility] Hotline: 254      Emergency Services [address] [facility]      Emergency Services Phone: [phone]    Making the environment safe: How can I make my environment (house/apartment/living space) safer? For example, can I remove guns, medications, and other items? 1. Patient stated there are no weapons at the discharge location.   2. Patient stated she feels safe living with her .

## 2021-08-30 NOTE — PROGRESS NOTES
Patient did not attend group despite staff encouragement.   Electronically signed by Getachew Rowley on 8/30/2021 at 12:17 PM

## 2021-08-31 PROBLEM — G89.28 CHRONIC NECK PAIN WITH HISTORY OF CERVICAL SPINAL SURGERY: Status: ACTIVE | Noted: 2021-08-31

## 2021-08-31 PROBLEM — Z98.890 CHRONIC NECK PAIN WITH HISTORY OF CERVICAL SPINAL SURGERY: Status: ACTIVE | Noted: 2021-08-31

## 2021-08-31 PROBLEM — M54.2 CHRONIC NECK PAIN WITH HISTORY OF CERVICAL SPINAL SURGERY: Status: ACTIVE | Noted: 2021-08-31

## 2021-08-31 PROCEDURE — 1240000000 HC EMOTIONAL WELLNESS R&B

## 2021-08-31 PROCEDURE — 99233 SBSQ HOSP IP/OBS HIGH 50: CPT | Performed by: PSYCHIATRY & NEUROLOGY

## 2021-08-31 PROCEDURE — 6370000000 HC RX 637 (ALT 250 FOR IP): Performed by: NURSE PRACTITIONER

## 2021-08-31 PROCEDURE — 90833 PSYTX W PT W E/M 30 MIN: CPT | Performed by: PSYCHIATRY & NEUROLOGY

## 2021-08-31 PROCEDURE — 6370000000 HC RX 637 (ALT 250 FOR IP): Performed by: PSYCHIATRY & NEUROLOGY

## 2021-08-31 RX ORDER — TOPIRAMATE 100 MG/1
100 TABLET, FILM COATED ORAL DAILY
Status: DISCONTINUED | OUTPATIENT
Start: 2021-09-01 | End: 2021-09-01 | Stop reason: HOSPADM

## 2021-08-31 RX ORDER — NORTRIPTYLINE HYDROCHLORIDE 25 MG/1
50 CAPSULE ORAL NIGHTLY
Status: DISCONTINUED | OUTPATIENT
Start: 2021-08-31 | End: 2021-09-01 | Stop reason: HOSPADM

## 2021-08-31 RX ORDER — METHOCARBAMOL 750 MG/1
750 TABLET, FILM COATED ORAL 2 TIMES DAILY PRN
Qty: 20 TABLET | Refills: 0 | Status: SHIPPED | OUTPATIENT
Start: 2021-08-31 | End: 2021-09-10

## 2021-08-31 RX ORDER — METHOCARBAMOL 750 MG/1
750 TABLET, FILM COATED ORAL 2 TIMES DAILY PRN
Status: DISCONTINUED | OUTPATIENT
Start: 2021-08-31 | End: 2021-09-01 | Stop reason: HOSPADM

## 2021-08-31 RX ORDER — NALOXONE HYDROCHLORIDE 4 MG/.1ML
1 SPRAY NASAL PRN
Qty: 1 EACH | Refills: 1 | Status: SHIPPED | OUTPATIENT
Start: 2021-08-31 | End: 2021-09-30

## 2021-08-31 RX ADMIN — CARVEDILOL 12.5 MG: 12.5 TABLET, FILM COATED ORAL at 20:44

## 2021-08-31 RX ADMIN — THERA TABS 1 TABLET: TAB at 08:32

## 2021-08-31 RX ADMIN — ATORVASTATIN CALCIUM 10 MG: 10 TABLET, FILM COATED ORAL at 20:44

## 2021-08-31 RX ADMIN — HYDROCODONE BITARTRATE AND ACETAMINOPHEN 1 TABLET: 7.5; 325 TABLET ORAL at 18:49

## 2021-08-31 RX ADMIN — HYDROCODONE BITARTRATE AND ACETAMINOPHEN 1 TABLET: 7.5; 325 TABLET ORAL at 08:41

## 2021-08-31 RX ADMIN — TOPIRAMATE 100 MG: 100 TABLET, FILM COATED ORAL at 08:32

## 2021-08-31 RX ADMIN — NORTRIPTYLINE HYDROCHLORIDE 50 MG: 25 CAPSULE ORAL at 20:44

## 2021-08-31 RX ADMIN — CARVEDILOL 12.5 MG: 12.5 TABLET, FILM COATED ORAL at 08:32

## 2021-08-31 ASSESSMENT — ENCOUNTER SYMPTOMS
BACK PAIN: 1
EYES NEGATIVE: 1
RESPIRATORY NEGATIVE: 1
GASTROINTESTINAL NEGATIVE: 1
ALLERGIC/IMMUNOLOGIC NEGATIVE: 1

## 2021-08-31 ASSESSMENT — PAIN SCALES - GENERAL
PAINLEVEL_OUTOF10: 9
PAINLEVEL_OUTOF10: 2
PAINLEVEL_OUTOF10: 7
PAINLEVEL_OUTOF10: 3

## 2021-08-31 NOTE — PROGRESS NOTES
Patient accepted PRN Vincent for complaint of 7/10 neck pain at 2042.  Electronically signed by Yovanny Julien RN on 8/30/21 at 8:44 PM EDT

## 2021-08-31 NOTE — PROGRESS NOTES
Out on unit. Social. Interacts appropriately. Tearful when speaking about recent deaths. Feels she is able to begin to work on moving on. Voicing positive outllook on future. Wants to get involved more with volunteer work. Denies SI. Depression is at a 5/10. States she has forgiven friend that called 911.

## 2021-08-31 NOTE — BH NOTE
Met with patient in person. ECT explained. [facility] Electroconvulsive Therapy Handout given. ECT video viewed by patient. Education given on need to be NPO at 4am the day of ECT. Pt states she wants to have ECT beginning Sept 27.

## 2021-08-31 NOTE — PROGRESS NOTES
Pt. declined to attend the 0900 community meeting, despite staff encouragement. Electronically signed by Jennifer Fuentes [address] on 8/31/2021 at 12:28 PM

## 2021-08-31 NOTE — GROUP NOTE
Group Therapy Note    Date: 8/31/2021    Group Start Time: 1630  Group End Time: 1700  Group Topic: Healthy Living/Wellness    [facility]    Grey Bailey        Group Therapy Note    Attendees: 7/15         Patient's Goal:  To learn about nutrition and healthy eating. Notes:  Patient participated in group discussion.      Status After Intervention:  Unchanged    Participation Level: Interactive    Participation Quality: Appropriate and Attentive      Speech:  normal      Thought Process/Content: Logical      Affective Functioning: Congruent      Mood: euthymic      Level of consciousness:  Alert and Attentive      Response to Learning: Able to verbalize current knowledge/experience      Endings: None Reported    Modes of Intervention: Education      Discipline Responsible: [address], [employer] [address] [employer]      Signature:  Dionisio Bailey

## 2021-08-31 NOTE — GROUP NOTE
Group Therapy Note    Date: 8/31/2021    Group Start Time: 1000  Group End Time: 1050  Group Topic: Psychoeducation    [facility]    Balbina Melgar        Group Therapy Note    Attendees: 5         Patient's Goal:  \"To work on myself and never have suicidal thoughts\"    Notes:  Patient was attentive, more talkative and she did interact with her peers in group. She work fairly on her task. Status After Intervention:  Improved    Participation Level:  Active Listener    Participation Quality: Appropriate      Speech:  normal      Thought Process/Content: Linear      Affective Functioning: Congruent      Mood: calmer      Level of consciousness:  Alert      Response to Learning: Progressing to goal      Endings: None Reported    Modes of Intervention: Education, Socialization and Activity      Discipline Responsible: Psychoeducational Specialist      Signature:  Ananth Collazo

## 2021-08-31 NOTE — GROUP NOTE
Group Therapy Note    Date: 8/30/2021    Group Start Time: 2030  Group End Time: 2045  Group Topic: Wrap-Up    [facility]    Dorie López        Group Therapy Note    Attendees: 9/15         Patient's Goal:  \"to take a shower\"    Notes:  Patient reported meeting their goal for the day. Patient reported having a much more positive day today than she did all weekend.     Status After Intervention:  Unchanged    Participation Level: Interactive    Participation Quality: Appropriate, Attentive and Sharing      Speech:  normal      Thought Process/Content: Logical      Affective Functioning: Congruent      Mood: euthymic      Level of consciousness:  Alert and Attentive      Response to Learning: Progressing to goal      Endings: None Reported    Modes of Intervention: Support      Discipline Responsible: [employer]      Signature:  Dorie López

## 2021-08-31 NOTE — GROUP NOTE
Group Therapy Note    Date: 8/31/2021    Group Start Time: 1300  Group End Time: 6791  Group Topic: Healthy Living/Wellness    [facility]    Shona Godinez        Group Therapy Note    Attendees: 6/15         Patient's Goal:  To socialize with peers    Notes:  Patient actively participated in group. When patient first arrived to group, she was irritable. She stated that the groups are repetitive. Pt reassured we would be doing something different. Patient apologized for this behavior at the end of group. Status After Intervention:  Improved    Participation Level:  Active Listener and Interactive    Participation Quality: Appropriate, Attentive and Sharing      Speech:  normal      Thought Process/Content: Logical      Affective Functioning: Congruent      Mood: euthymic      Level of consciousness:  Alert and Attentive      Response to Learning: Progressing to goal      Endings: None Reported    Modes of Intervention: Socialization      Discipline Responsible: [address], [facility] [employer]      Signature:  Shona Godinez

## 2021-08-31 NOTE — PROGRESS NOTES
Admission is complete. Pt is out in the day room social with peers. Pt reports she is angry to be here. When this nurse asked about her suicide attempt pt laughed and said she only wanted to sleep for the night. Pt minimizes the need to be here. When this nurse was asking questions pt would roll her eyes. Pt states she did not need to be on a 1:1 that is was a \"misunderstanding\". Denies SI/HI and AVH.

## 2021-08-31 NOTE — CARE COORDINATION
be happy, get out and live a little more. She's just been hanging out in the house. Gotten into a rut of depression, hoping something can come of it. Discharge Plan:   To go home    Support Network Supportive of Discharge Plan:   Yes    Support can confirm Safety of Location and Security of Weapons:   Empty shot gun, no shells. Support agreeable to Safeguard and Monitor Medications (including Prescription and OTC):   Agrees.      Identified Barriers to Compliance with Discharge Plan:   None identified    Recommendations for Support Network:   Be available for follow up calls      Antonio Altman

## 2021-08-31 NOTE — GROUP NOTE
Group Therapy Note    Date: 8/31/2021    Group Start Time: 8818  Group End Time: 5630  Group Topic: Healthy Living/Wellness    [facility]    Vianey Galvez RN        Group Therapy Note    Attendees: 5/15         Patient's Goal:  Identify and discuss negative and positive coping skills. Notes: Attentive and appropriate. Status After Intervention:  Improved    Participation Level:  Active Listener and Interactive    Participation Quality: Appropriate, Attentive, Sharing and Supportive      Speech:  normal      Thought Process/Content: Logical      Affective Functioning: Congruent      Mood: euthymic      Level of consciousness:  Alert, Oriented x4 and Attentive      Response to Learning: Progressing to goal      Endings: None Reported    Modes of Intervention: Education, Support, Socialization and Exploration      Discipline Responsible: Registered Nurse      Signature:  Vianey Galvez RN

## 2021-08-31 NOTE — PROGRESS NOTES
[facility]. FOLLOW-UP NOTE       2021     Patient was seen and examined in person, Chart reviewed   Patient's case discussed with staff/team    Chief Complaint: Depression SA    Interim History:     Pt takes topamax for CPRS and spasticity  Feel less foggy and clearer thinking  Still feel depressed but continue to minimize  Later on she said she is going to open up  She feel stuck about losing so many family members  Grieving the loss of her family  Coming to acceptance that her family member who  are not going to come back  She is grateful that Xiomy Drown her friend called for help.  \"Otherwise I would not be here talking\"  Admit it was a overdose and she meant to commit suicide  Has lost her personality and hobbies for 2 years  She like taking her boat to the [address] [place] [address], swimming and keep herself busy so much, that she lost her personality  Suicidal attempt - looking back at the incident, she feel that she is being selfish and feel bad about the act   denies that she is not suicidal    Appetite:   [x] Normal/Unchanged  [] Increased  [] Decreased      Sleep:       [] Normal/Unchanged  [x] Fair       [] Poor              Energy:    [] Normal/Unchanged  [] Increased  [x] Decreased        SI [] Present  [x] Absent    HI  []Present  [x] Absent     Aggression:  [] yes  [] no    Patient is [] able  [] unable to CONTRACT FOR SAFETY     PAST MEDICAL/PSYCHIATRIC HISTORY:   Past Medical History:   Diagnosis Date    Anxiety     Depression     Hyperlipidemia     Hypopotassemia     Lumbago     Osteopenia     PTSD (post-traumatic stress disorder)     Pulmonary embolism ([facility] [address]     4 PE in bilat lungs    Spinal stenosis     congenital       FAMILY/SOCIAL HISTORY:  Family History   Problem Relation Age of Onset    Celiac Disease Neg Hx     Crohn's Disease Neg Hx      Social History     Socioeconomic History    Marital status:      Spouse name: Yvan Callejas Number of children: 1  Years of education:  15    Highest education level: Associate degree: occupational, technical, or vocational program   Occupational History    Occupation: On disability     Comment: Used to work as a  and did odd jobs   Tobacco Use    Smoking status: Current Every Day Smoker     Packs/day: 0.50     Years: 40.00     Pack years: 20.00     Types: Cigarettes    Smokeless tobacco: Never Used   Vaping Use    Vaping Use: Never used   Substance and Sexual Activity    Alcohol use: No    Drug use: Yes     Types: Marijuana     Comment: Medical Marijuana; CBD drops     Sexual activity: Not on file   Other Topics Concern    Not on file   Social History Narrative    Lives at Home with her . Nehemiah Rees in  [facility]      Had 1 son who is now  approximately 3 years ago (heroine addiction). She denies any addiction in herself or her . But she is on [address] Social Determinants of Health     Financial Resource Strain:     Difficulty of Paying Living Expenses:    Food Insecurity:     Worried About Running Out of Food in the Last Year:     [address] in the Last Year:    Transportation Needs:     Lack of Transportation (Medical):  Lack of Transportation (Non-Medical):    Physical Activity:     Days of Exercise per Week:     Minutes of Exercise per Session:    Stress:     Feeling of Stress :    Social Connections:     Frequency of Communication with Friends and Family:     Frequency of Social Gatherings with Friends and Family:     Attends Hoahaoism Services:     Active Member of Clubs or Organizations:     Attends Club or Organization Meetings:     Marital Status:    Intimate Partner Violence:     Fear of Current or Ex-Partner:     Emotionally Abused:     Physically Abused:     Sexually Abused:            ROS:  [x] All negative/unchanged except if checked.  Explain positive(checked items) below:  [] Constitutional  [] Eyes  [] Ear/Nose/Mouth/Throat  [] Respiratory  [] CV  [] GI  []   [] Musculoskeletal  [] Skin/Breast  [] Neurological  [] Endocrine  [] Heme/Lymph  [] Allergic/Immunologic    Explanation:     MEDICATIONS:    Current Facility-Administered Medications:     carvedilol (COREG) tablet 12.5 mg, 12.5 mg, Oral, BID, Sandy Beasley, APRN - CNP, 12.5 mg at 08/31/21 5272    acetaminophen (TYLENOL) tablet 650 mg, 650 mg, Oral, Q6H PRN, Sean Horner MD    atorvastatin (LIPITOR) tablet 10 mg, 10 mg, Oral, Daily, Nimisha Collie, APRN - CNP, 10 mg at 08/30/21 2042    multivitamin 1 tablet, 1 tablet, Oral, Daily, [facility] Col[facility], APRN - CNP, 1 tablet at 08/31/21 9354    HYDROcodone-acetaminophen (Magalys Gilberto) 7.5-325 MG per tablet 1 tablet, 1 tablet, Oral, Q8H PRN, [facility] Collie, APRN - CNP, 1 tablet at 08/31/21 0841    topiramate (TOPAMAX) tablet 100 mg, 100 mg, Oral, BID, [facility] Collie, APRN - CNP, 100 mg at 08/31/21 2276    nicotine (NICODERM CQ) 14 MG/24HR 1 patch, 1 patch, Transdermal, Daily, Leeroy Donis MD, 1 patch at 08/31/21 1229      Examination:  /89   Pulse 82   Temp 98.8 °F (37.1 °C) (Oral)   Resp 18   Ht 5' 3\" (1.6 m)   Wt 125 lb (56.7 kg)   SpO2 95%   BMI 22.14 kg/m²   Gait - steady  Medication side effects(SE): no    Mental Status Examination:    Level of consciousness:  within normal limits   Appearance:  fair grooming and fair hygiene  Behavior/Motor:  psychomotor retardation  Attitude toward examiner:  cooperative  Speech:  slow   Mood: depressed  Affect:  mood congruent  Thought processes:  goal directed   Thought content:  Suicidal Ideation:  denies suicidal ideation  Delusions:  no evidence of delusions  Perceptual Disturbance:  denies any perceptual disturbance  Cognition:  oriented to person, place, and time   Concentration intact  Insight fair   Judgement fair     ASSESSMENT:   Patient symptoms are:  [] Well controlled  [] Improving  [] Worsening  [] No change      Diagnosis:   MDD recurrent severe  Active Problems:    Anxiety and depression    Lumbago    Tobacco use disorder    High risk medication use    Myalgia    Spinal stenosis    Medical marijuana use    Grief reaction with prolonged bereavement    PTSD (post-traumatic stress disorder)    Major depressive disorder, recurrent episode, moderate (HCC)    Opioid dependence with opioid-induced disorder (HCC)    DEBI (generalized anxiety disorder)    Major depressive disorder, recurrent severe without psychotic features ([facility] [address]    Chronic pain associated with significant psychosocial dysfunction    [facility]  Resolved Problems:    Chronic pain disorder      LABS:    Recent Labs     08/28/21  1500   WBC 12.5*   HGB 16.0        Recent Labs     08/28/21  1500      K 5.1*      CO2 25   BUN 9   CREATININE 0.62   GLUCOSE 85     Recent Labs     08/28/21  1500   BILITOT 0.5   ALKPHOS 109   AST 41*   ALT 32     Lab Results   Component Value Date    LABAMPH Neg 08/28/2021    LABAMPH Neg 08/28/2021    BARBSCNU Neg 08/28/2021    BARBSCNU Neg 08/28/2021    LABBENZ Neg 08/28/2021    LABBENZ POSITIVE 08/28/2021    LABMETH Neg 08/28/2021    LABMETH Neg 08/28/2021    OPIATESCREENURINE Neg 08/28/2021    OPIATESCREENURINE POSITIVE 08/28/2021    PHENCYCLIDINESCREENURINE Neg 08/28/2021    PHENCYCLIDINESCREENURINE Neg 08/28/2021    ETOH <10 08/28/2021     Lab Results   Component Value Date    TSH 1.410 08/28/2021     No results found for: LITHIUM  No results found for: VALPROATE, CBMZ    RISK ASSESSMENT: low suicide risk    Treatment Plan:  Reviewed current Medications with the patient. Discussed with patient at length about ECT  Pamelor started  D/W Dr Kristal Mello  Risks, benefits, side effects, drug-to-drug interactions and alternatives to treatment were discussed. Collateral information: reviewed  Encourage patient to attend group and other milieu activities.   Discharge planning discussed with the patient and treatment team.    PSYCHOTHERAPY/COUNSELING:  [x] Therapeutic interview  [x] Supportive  [] CBT  [] Ongoing  [] Other  Patient was seen 1:1 for 20 minutes, other than E&M time spent, focusing on      - coping skills techniques     - Anxiety management techniques discussed including deep breathing exercise and PMR     - discussing patients strength and weakness      - Focusing on negative cognition and maladaptive thoughts, which is feeding and maintaining the depression symptoms         [x] Patient continues to need, on a daily basis, active treatment furnished directly by or requiring the supervision of inpatient psychiatric personnel      Anticipated Length of stay:            Electronically signed by Elvira Neely MD on 8/31/2021 at 10:51 AM

## 2021-08-31 NOTE — GROUP NOTE
Group Therapy Note    Date: 8/30/2021    Group Start Time: 2000  Group End Time: 2030  Group Topic: Recreational    [facility]    DramaFever        Group Therapy Note    Attendees: 8/15         Patient's Goal:  To play Wii Bowling with the group. Notes:  Patient arrived toward the end of group, as she was with her nurse. Patient watched peers play and joked with them, but did not play.     Status After Intervention:  Unchanged    Participation Level: Interactive    Participation Quality: Appropriate, Attentive and Supportive      Speech:  normal      Thought Process/Content: Logical      Affective Functioning: Congruent      Mood: euthymic      Level of consciousness:  Alert and Attentive      Response to Learning: Progressing to goal      Endings: None Reported    Modes of Intervention: Activity      Discipline Responsible: [address], [employer]      Signature:  Dr[employer]Fever

## 2021-08-31 NOTE — GROUP NOTE
Group Therapy Note    Date: 8/31/2021    Group Start Time: 1140  Group End Time: 1200  Group Topic: Healthy Living/Wellness    [facility]    Akbar Curiel        Group Therapy Note    Attendees: 6/16         Patient's Goal:  To interact with group and \"to keep working to get better to go home ASAP and get back to [facility] (TV show). \"    Notes:  Patient actively participated in group. Patient was seen laughing with peer. Patient says she is \"doing alright, a little anxious. \" Patient goes on to say, \"but it's all good\" referring to her anxiety. Patient was talking over peers at times and was reluctant to share at times. Status After Intervention:  Improved    Participation Level:  Active Listener and Interactive    Participation Quality: Appropriate      Speech:  normal      Thought Process/Content: Logical      Affective Functioning: Congruent      Mood: anxious      Level of consciousness:  Alert      Response to Learning: Progressing to goal      Endings: None Reported    Modes of Intervention: Socialization      Discipline Responsible: [address], [facility]      Signature:  Akbar Curiel

## 2021-08-31 NOTE — PROGRESS NOTES
Patient did not attend group despite staff encouragement.   Electronically signed by Mary Candelario on 8/31/2021 at 7:58 PM

## 2021-08-31 NOTE — GROUP NOTE
Group Therapy Note    Date: 8/30/2021    Group Start Time: 0174  Group End Time: 1915  Group Topic: Healthy Living/Wellness    [facility]    Rozina Harper        Group Therapy Note    Attendees: 8/15         Patient's Goal:  To learn about and practice healthy coping skills. Notes:  Patient actively participated in group.     Status After Intervention:  Unchanged    Participation Level: Interactive    Participation Quality: Appropriate and Attentive      Speech:  normal      Thought Process/Content: Logical      Affective Functioning: Congruent      Mood: euthymic      Level of consciousness:  Alert and Attentive      Response to Learning: Progressing to goal      Endings: None Reported    Modes of Intervention: Education      Discipline Responsible: [address], [employer]      Signature:  Rozina Harper

## 2021-08-31 NOTE — CONSULTS
[address] [place] [address] psychiatry department    SERVICE DATE:  8/31/2021   SERVICE TIME:  12:40 PM    REASON FORCONSULT: Management of patient current opioid therapy, admitted with overdose and other tricyclic and old prescription of benzodiazepine  REQUESTING PHYSICIAN: Psychiatry team, medical team  PRIMARY CARE PHYSICIAN:Marleni Brown, APRN - CNP    Chief complaint: Generalized depression, anxiety, generalized pain    HISTORY OF PRESENTILLNESS:  Ms. Dalton Montalvo is a 64 y.o. female who presents for   Chief Complaint   Patient presents with    Drug Overdose    Suicidal       64years old female was admitted to [facility] after had suspected overdose using old benzodiazepine and antidepressant tricyclic medication. I was consulted to evaluate the patient's current opioid medication regimen, prescribed by her current PCP for her chronic neck pain due to history of congenital spinal stenosis, status post fourth cervical spine surgeries over the past 40 years. Patient has multiple stressors over the past 5 years started by losing her son to overdose 4 years ago, also lost her brother to heart attack unexpectedly, also patient has recently loss of her letter to make her very depressed. Patient has baseline chronic anxiety and depression, been on antidepressant as an outpatient. She has no active prescription of benzodiazepine however she was told the benzodiazepine.     PAIN  ASSESSMENT:    constant, waxing and waning    aching, shooting, stabbing, throbbing and tight band    pain is perceived as severe (6-8 pain scale)    PAST MEDICAL HISTORY:    Past Medical History:   Diagnosis Date    Anxiety     Depression     Hyperlipidemia     Hypopotassemia     Lumbago     Osteopenia     PTSD (post-traumatic stress disorder)     Pulmonary embolism (HCC)     4 PE in bilat lungs    Spinal stenosis     congenital     PAST SURGICAL HISTORY:    Past Surgical History:   Procedure Laterality Date    HYSTERECTOMY      SPINE SURGERY      cervical x 4, fusions and reconstruction    TONSILLECTOMY      age 3     FAMILY HISTORY:    Family History   Problem Relation Age of Onset    Celiac Disease Neg Hx     Crohn's Disease Neg Hx      SOCIALHISTORY:    Social History     Socioeconomic History    Marital status:      Spouse name: Porfirio Brown Number of children: 1    Years of education:  15    Highest education level: Associate degree: occupational, technical, or vocational program   Occupational History    Occupation: On disability     Comment: Used to work as a  and did odd jobs   Tobacco Use    Smoking status: Current Every Day Smoker     Packs/day: 0.50     Years: 40.00     Pack years: 20.00     Types: Cigarettes    Smokeless tobacco: Never Used   Vaping Use    Vaping Use: Never used   Substance and Sexual Activity    Alcohol use: No    Drug use: Yes     Types: Marijuana     Comment: Medical Marijuana; CBD drops     Sexual activity: Not on file   Other Topics Concern    Not on file   Social History Narrative    Lives at Home with her . [facility] in  [address]     Had 1 son who is now  approximately 3 years ago (heroine addiction). She denies any addiction in herself or her . But she is on [address] Social Determinants of Health     Financial Resource Strain:     Difficulty of Paying Living Expenses:    Food Insecurity:     Worried About Running Out of Food in the Last Year:     [address] in the Last Year:    Transportation Needs:     Lack of Transportation (Medical):      Lack of Transportation (Non-Medical):    Physical Activity:     Days of Exercise per Week:     Minutes of Exercise per Session:    Stress:     Feeling of Stress :    Social Connections:     Frequency of Communication with Friends and Family:     Frequency of Social Gatherings with Friends and Family:     Attends Baptism Services:     Active Member of Clubs or Organizations:     Attends Club or Organization Meetings:     Marital Status:    Intimate Partner Violence:     Fear of Current or Ex-Partner:     Emotionally Abused:     Physically Abused:     Sexually Abused:      PSYCHOLOGICAL HISTORY: Depression, anxiety, chronic opioid therapy  MEDICATIONS:  Medications Prior to Admission: VORTIoxetine (TRINTELLIX) 10 MG TABS tablet, Take 1 tablet by mouth daily  amitriptyline (ELAVIL) 100 MG tablet, Take 1 tablet by mouth nightly  carvedilol (COREG) 12.5 MG tablet, Take 1 tablet by mouth 2 times daily  aspirin EC 81 MG EC tablet, Take 1 tablet by mouth daily  topiramate (TOPAMAX) 100 MG tablet, Take 1 tablet by mouth 2 times daily  atorvastatin (LIPITOR) 10 MG tablet, Take 1 tablet by mouth daily  Multiple Vitamin (MULTI-VITAMIN DAILY PO), Take by mouth  Wheat Dextrin (BENEFIBER) POWD, Take 4 g by mouth 3 times daily (with meals)  [unfilled]    ALLERGIES:  Latex, Fentanyl, Ketoprofen, Morphine, Nsaids, Tetracycline, Cefaclor, Cymbalta [duloxetine hcl], and Tetracyclines & related    COMPLETE REVIEW OF SYSTEMS:  As noted in HPI, 12 point ROS reviewed and otherwise negative. Review of Systems   Constitutional: Positive for activity change and fatigue. Negative for appetite change, chills, diaphoresis, fever and unexpected weight change. HENT: Negative. Eyes: Negative. Respiratory: Negative. Cardiovascular: Negative. Gastrointestinal: Negative. Endocrine: Negative. Genitourinary: Negative. Musculoskeletal: Positive for arthralgias, back pain, gait problem, joint swelling, myalgias, neck pain and neck stiffness. Skin: Negative. Allergic/Immunologic: Negative. Neurological: Positive for dizziness, weakness, light-headedness and numbness. Negative for tremors, seizures, facial asymmetry, speech difficulty and headaches. Hematological: Negative.     Psychiatric/Behavioral: Positive for behavioral problems, confusion, decreased history of cervical spinal surgery [M54.2, G89.28, Z98.890] 08/31/2021    Chronic pain associated with significant psychosocial dysfunction [G89.4] 08/30/2021    Suicide attempt by benzodiazepine overdose ([facility] Maclle Side 08/30/2021    Major depressive disorder, recurrent severe without psychotic features ([facility] [F33.2] 08/28/2021    DEBI (generalized anxiety disorder) [F41.1] 03/03/2021    Opioid dependence with opioid-induced disorder ([facility] [F11.29] 08/17/2020    Major depressive disorder, recurrent episode, moderate (HCC) [F33.1] 08/10/2020    PTSD (post-traumatic stress disorder) [F43.10] 08/10/2020    Grief reaction with prolonged bereavement [F43.21] 06/02/2020    Medical marijuana use [Z79.899] 05/27/2020    Myalgia [M79.10] 04/29/2020    High risk medication use [Z79.899] 04/29/2020    Spinal stenosis [M48.00]     Anxiety and depression [F41.9, F32.9] 05/16/2013    Muscle weakness (generalized) [M62.81] 10/15/2009    Postlaminectomy syndrome, cervical region [M96.1] 03/25/2009    Lumbago [M54.5] 03/11/2009    Tobacco use disorder [F17.200] 11/04/2003     64years old female was admitted to [facility] after had suspected overdose using old benzodiazepine and antidepressant tricyclic medication. I was consulted to evaluate the patient's current opioid medication regimen, prescribed by her current PCP for her chronic neck pain due to history of congenital spinal stenosis, status post fourth cervical spine surgeries over the past 40 years. Patient has multiple stressors over the past 5 years started by losing her son to overdose 4 years ago, also lost her brother to heart attack unexpectedly, also patient has recently loss of her letter to make her very depressed. Patient has baseline chronic anxiety and depression, been on antidepressant as an outpatient. She has no active prescription of benzodiazepine however she was told the benzodiazepine.     Since patient came to the hospital, she is for NSAIDs due to her probable more side effect with her colitis. Education and counseling, continues follow-up with her psychology/psychiatry, close monitoring, proper compliance checkup on her current regimen. Thank you for having me consulted on Mrs Cecil Matos, please contact me if any question concern about her current regimen, prescription for both Narcan and muscle action will be sent to the pharmacy.     SIGNATURE: Elle Wheatley MD PATIENT NAME: Min Ventura   DATE: August 31, 2021 MRN: [id number]   TIME: 12:40 PM PAGER/CONTACT #: [phone]

## 2021-08-31 NOTE — GROUP NOTE
Group Therapy Note    Date: 8/31/2021    Group Start Time: 1400  Group End Time: 1430  Group Topic: Healthy Living/Wellness    [facility]    Edwina Reeves RN; Cheko Sheldon RN        Group Therapy Note    Attendees: 5/10         Patient's Goal:  To identify coping skills    Notes:  Pt actively participated in group    Status After Intervention:  Improved    Participation Level: Interactive    Participation Quality: Attentive and Sharing      Speech:  normal      Thought Process/Content: Logical      Affective Functioning: Congruent      Mood: WNL      Level of consciousness:  Attentive      Response to Learning: Able to verbalize current knowledge/experience and Progressing to goal      Endings: None Reported    Modes of Intervention: Problem-solving      Discipline Responsible: Registered Nurse      Signature:  Edwina Reeves RN

## 2021-08-31 NOTE — CONSULTS
Pain management brief summary, full consult note to follow.:      Adolfo Galvez [address] Problems    Diagnosis Date Noted    Chronic neck pain with history of cervical spinal surgery [M54.2, G89.28, Z98.890] 08/31/2021    Chronic pain associated with significant psychosocial dysfunction [G89.4] 08/30/2021    Suicide attempt by benzodiazepine overdose ([facility] Timmy Mcclelland 08/30/2021    Major depressive disorder, recurrent severe without psychotic features ([facility] [F33.2] 08/28/2021    DEBI (generalized anxiety disorder) [F41.1] 03/03/2021    Opioid dependence with opioid-induced disorder ([facility] [F11.29] 08/17/2020    Major depressive disorder, recurrent episode, moderate (HCC) [F33.1] 08/10/2020    PTSD (post-traumatic stress disorder) [F43.10] 08/10/2020    Grief reaction with prolonged bereavement [F43.21] 06/02/2020    Medical marijuana use [Z79.899] 05/27/2020    Myalgia [M79.10] 04/29/2020    High risk medication use [Z79.899] 04/29/2020    Spinal stenosis [M48.00]     Anxiety and depression [F41.9, F32.9] 05/16/2013    Muscle weakness (generalized) [M62.81] 10/15/2009    Postlaminectomy syndrome, cervical region [M96.1] 03/25/2009    Lumbago [M54.5] 03/11/2009    Tobacco use disorder [F17.200] 11/04/2003     64years old female was admitted to [facility] after had suspected overdose using old benzodiazepine and antidepressant tricyclic medication. I was consulted to evaluate the patient's current opioid medication regimen, prescribed by her current PCP for her chronic neck pain due to history of congenital spinal stenosis, status post fourth cervical spine surgeries over the past 40 years. Patient has multiple stressors over the past 5 years started by losing her son to overdose 4 years ago, also lost her brother to heart attack unexpectedly, also patient has recently loss of her letter to make her very depressed.   Patient has baseline chronic anxiety and depression, been on muscle relaxant like Skelaxin or Robaxin or Norflex orally as needed for muscle spasm may decrease the need for pain medication. Unfortunately rectal NSAID not candidate for NSAIDs due to her probable more side effect with her colitis. Education and counseling, continues follow-up with her psychology/psychiatry, close monitoring, proper compliance checkup on her current regimen. Thank you for having me consulted on Mrs Antonieta Powell, please contact me if any question concern about her current regimen, prescription for both Narcan and muscle action will be sent to the pharmacy.     SIGNATURE: Tim Granado MD PATIENT NAME: Polo Ledesma   DATE: August 31, 2021 MRN: [id number]   TIME: 12:40 PM PAGER/CONTACT #: [phone]

## 2021-09-01 ENCOUNTER — APPOINTMENT (OUTPATIENT)
Dept: GENERAL RADIOLOGY | Age: 61
DRG: 885 | End: 2021-09-01
Payer: MEDICARE

## 2021-09-01 VITALS
HEIGHT: 63 IN | HEART RATE: 95 BPM | RESPIRATION RATE: 18 BRPM | SYSTOLIC BLOOD PRESSURE: 135 MMHG | TEMPERATURE: 98.1 F | WEIGHT: 125 LBS | OXYGEN SATURATION: 97 % | BODY MASS INDEX: 22.15 KG/M2 | DIASTOLIC BLOOD PRESSURE: 102 MMHG

## 2021-09-01 PROCEDURE — 6370000000 HC RX 637 (ALT 250 FOR IP): Performed by: NURSE PRACTITIONER

## 2021-09-01 PROCEDURE — 71046 X-RAY EXAM CHEST 2 VIEWS: CPT

## 2021-09-01 PROCEDURE — 6370000000 HC RX 637 (ALT 250 FOR IP): Performed by: PSYCHIATRY & NEUROLOGY

## 2021-09-01 PROCEDURE — 99239 HOSP IP/OBS DSCHRG MGMT >30: CPT | Performed by: PSYCHIATRY & NEUROLOGY

## 2021-09-01 RX ORDER — NORTRIPTYLINE HYDROCHLORIDE 50 MG/1
50 CAPSULE ORAL NIGHTLY
Qty: 15 CAPSULE | Refills: 2 | Status: SHIPPED | OUTPATIENT
Start: 2021-09-01 | End: 2021-09-22

## 2021-09-01 RX ADMIN — THERA TABS 1 TABLET: TAB at 08:55

## 2021-09-01 RX ADMIN — HYDROCODONE BITARTRATE AND ACETAMINOPHEN 1 TABLET: 7.5; 325 TABLET ORAL at 09:01

## 2021-09-01 RX ADMIN — CARVEDILOL 12.5 MG: 12.5 TABLET, FILM COATED ORAL at 08:55

## 2021-09-01 RX ADMIN — TOPIRAMATE 100 MG: 100 TABLET, FILM COATED ORAL at 08:55

## 2021-09-01 ASSESSMENT — PAIN SCALES - GENERAL
PAINLEVEL_OUTOF10: 5
PAINLEVEL_OUTOF10: 3

## 2021-09-01 NOTE — DISCHARGE INSTR - DIET

## 2021-09-01 NOTE — PROGRESS NOTES
Patient did not attend 11am group despite staff encouragement.   Electronically signed by Rosaura Stanley on 9/1/2021 at 1:47 PM

## 2021-09-01 NOTE — PROGRESS NOTES
Pt. declined to attend the 0900 community meeting, despite staff encouragement.  Electronically signed by Maurice Tanner on 9/1/2021 at 12:32 PM

## 2021-09-01 NOTE — GROUP NOTE
Group Therapy Note    Date: 8/31/2021    Group Start Time: 2100  Group End Time: 2120  Group Topic: Wrap-Up    [facility]    Valeria Lopez        Group Therapy Note    Attendees: 8/15         Patient's Goal:  \"try to get to a better place in my head\"    Notes:  Patient reported meeting their goal for the day.  Patient shared that she had a \"wonderful day\" and wished everyone in the room the best.    Status After Intervention:  Unchanged    Participation Level: Interactive    Participation Quality: Appropriate, Attentive, Sharing and Supportive      Speech:  normal      Thought Process/Content: Logical      Affective Functioning: Congruent      Mood: euthymic      Level of consciousness:  Alert and Attentive      Response to Learning: Progressing to goal      Endings: None Reported    Modes of Intervention: Support      Discipline Responsible: [facility] [address], [employer] [facility] [employer]      Signature:  Valeria Lopez

## 2021-09-01 NOTE — DISCHARGE SUMMARY
DISCHARGE SUMMARY      Patient ID:  Raza Gamino  [id number]  18 y.o.  1960      Admit date: 2021    Discharge date and time: 2021    Admitting Physician: Sissy Marroquin MD     Discharge Physician: Dr Florentino Sexton MD    Admission Diagnoses: Tobacco dependence [F17.200]  Major depressive disorder, recurrent severe without psychotic features ([facility] [address] [F33.2]  Major depressive disorder without psychotic features [F32.9]    Admission Condition: poor    Discharged Condition: stable    Admission Circumstance:     64year old female presented to ED post overdose attempts. She states at 1130 this morning she purposely attempted to overdose on an old Klonopin and Remeron prescription, unsure of quantity. She states she has had many going stressors for year,s has tried many support groups and medications, and has had no effect. She states she \"Just wanted it all to end\". Primary stressors at this time are loosing her best in  to a heart attack, and he only son in 2017 to an opiod overdose. She states she never fully got over or recovered from either event, despite trying. She admits her  is very supportive. She acknowledges had she been successful in her overdose, he would have been devastated, but appears very ambivalent. She denies any HI, or A/V hallucinations.     HISTORY OF PRESENT ILLNESS:       The patient is a 64 y.o. female with significant past history of MDD recurrent, see Dr Padmini Miller as OP. Pt live with her , no living children, son  4 years ago from drug addiction overdose. She lost her best friend - knew her for 37 years. Brother  9 weeks after son death. Dad  7 weeks ago.      Pt still grieve the loss of her son and best friend, when the news of dad death hot her. Pt was feeling depressed and anxious, suicidal thoughts.   Severity: Rating mood to be around 3/10 (10- good)  Quality:melancholic  Worse in the morning  Content: Hopeless, worthless and helpless feeling  Suicidal thoughts - want to take remeron and klonopin overdose  Associated symptoms:  Poor concentration, anhedonia, decrease motivation  Sleep and appetite- poor  Took klonopin x 4 tab from the old prescription, 3 tab of remeron from old bottle  Pt called her best friend who called the EMS  Pt denies that this was an suicidal attempt        The patient is currently receiving care for the above psychiatric illness.     Medications Prior to Admission:     Prescriptions Prior to Admission   Medications Prior to Admission: VORTIoxetine (TRINTELLIX) 10 MG TABS tablet, Take 1 tablet by mouth daily  amitriptyline (ELAVIL) 100 MG tablet, Take 1 tablet by mouth nightly  carvedilol (COREG) 12.5 MG tablet, Take 1 tablet by mouth 2 times daily  aspirin EC 81 MG EC tablet, Take 1 tablet by mouth daily  topiramate (TOPAMAX) 100 MG tablet, Take 1 tablet by mouth 2 times daily  atorvastatin (LIPITOR) 10 MG tablet, Take 1 tablet by mouth daily  Multiple Vitamin (MULTI-VITAMIN DAILY PO), Take by mouth  Wheat Dextrin (BENEFIBER) POWD, Take 4 g by mouth 3 times daily (with meals)        Compliance:yes     Psychiatric Review of Systems       Depression: yes     Gaby or Hypomania:  no     Panic Attacks:  yes      Phobias:  no     Obsessions and Compulsions:  no     PTSD : no     Hallucinations:  no     Delusions:  no     Substance Abuse History:  ETOH: no   Marijuana: no  Opiates: no  Other Drugs: no        Past Psychiatric History:  Prior Diagnosis:  Major depressive disorder; recurrent  Psychiatrist: Dr Kadi Etienne  Therapist:yes  Hospitalization: yes  Hx of Suicidal Attempts: no  Hx of violence:  no  ECT: no     Previous discontinued Psychiatric Med Trials: From Dr Kadi Etienne record     DATE and changes made  · 6/2/21  · Previous medications   ? lexapro, abilify, effexor, wellbutrin previously, now on Cymbalta 60 mg   · 7/2/20   ? cymbalta was tapered and stopped. Was going to start wellbutrin  · 7/17/20 (between appts)  ?  Patient actually started the wellbutrin on her own  · 7/22/20  ? No changes as patient has only been on wellbutrin a few days so far  · 8/10/20  ? Currently on 75 mg, Amitriptylline 100 mg QHS (after current taper has completed)  · 8/13/20  ? Pt is still on elavil 75 mg QHS, will increase to 100 mg in several more days and is doing well, improved depression and GI symptoms on this   · 9/2/20  ? No side effects from elavil and doing well at 100 mg QHS   ? Propranolol 10 mg TID PRN anxiety has been helful and is using as needed   · 10/7  ? Rec. Decreasing topamax as this may be worsening daytime sedation   ? Patient will take 100 mg QHS   ? Then continue amitryptylline   · 11/24  ? Patient having worsening mood, has not been on her pain medications 2/2 discharged from practice as she had been getting medical mj from the dispensary   ? Patient will take amitriptylline 100 mg INCREASE to 125 mg QHS for one week, then to 150 mg QHS after that   · 12/15  ? Patient taking 150 mg QHS of elavil   § Helps with sleep, irritable bowel, less diarrhea, less pain, but not helping with motivation  § Elavil 100 mg QHS for sleep primarily (a decrease)  ? lexapro 5 mg QD   · 1/26/21  ? Lexapro 5 mg QD continue   ? Continue Elavil 100 mg QHS   ? No changes today  · 2/15  ? Elavil 100 mg QHS   ? START CYTOMEL 25 mcg   § CYTOMEL may be increased in 2 weeks to 50 mcg qd as needed and tolerated  § Augment for depression   § Is on multivitamin with calcium and vitamin D   ? Continue Lexapro  · 3/3/21  ? Amitriptyline 100 mg QHS   ? Lexapro 5 mg QD   ? Topamax 100 mg BID   ? INCREASE Cytomel 25 mcg QD to 50 mcg   · 4/7/21  ? CONTINUE liothyronine (Cytomel) 50 mcg which is being used as an augmentation to her antidepressant will decrease back to 25 mcg, patient reporting tremor, patient is reticent to get labwork due to fear of needles, but agrees to get thyroid labs to monitor this   ? Calcium and vitamin D started   ?  IN ONE WEEK, will stop escitalopram (Lexapro) 5 mg   ? Consider augmentation with low dose methylphenidate (Ritalin) to address continue difficulty with motivation, however, given age will obtain EKG prior to this and will recommend cardiology referral for any abnormalities, especially with the concurrent medications of a tricyclic antidepressant and thyroid hormone (which we would likely stop prior to starting the stimulant, as it has been minimally beneficial)  § Also in light of risk of worsening osteopenia, this risk may outweigh the  benefits if depression is not improving appreciably with the liothyronine (Cytomel)   · 5/5/21  ? Decrease liothyronine (Cytomel) to 25 mcg for one week, then 25 mcg every other day for a week then decrease to 12.5 mcg every other day for one week then off  ? Stopping this due to concern for worsening tachycardia and patient reports this is not working and she is having more tremor on this   ? Patient reports much worse depression since stopping escitalopram (Lexapro) but feels that she is less \"foggy\" and prefers to stay off of escitalopram (Lexapro) at this point. ? WILL START vortioxetine (Trintellix)   ? Will also send in zofran as needed for nausea  · 6/2/21  ? Continue vortioxetine (Trintellix) 5 mg, patient prefers not to increase this   § Has been helpful thus far   ? Continue taper off of thyroid medication    ? Propranolol hcl (Inderal) 10 mg three times daily as needed for anxiety   · 8/24/21  ?  Patient would like to increasing vortioxetine (Trintellix) to 10 mg   § Discussed risks of serotonin syndrome, Patient is on vortioxetine (Trintellix) as well as amitriptyline (Elavil); these medications in combination may increase the risk for serotonin syndrome so he should be monitored for symptoms of serotonin syndrome.  Serotonin syndrome can present with hyperreflexia, clonus, hyperthermia, diaphoresis, tremor, autonomic instability, as well as mental status changes and is considered a life-threatening medical emergency. ? Propranolol hcl (Inderal) 10 mg three times daily   ? amitriptyline (Elavil) 100 mg every night will continue   ? Consider at next appointment methylphenidate (Ritalin) instead of vortioxetine (Trintellix        PAST MEDICAL/PSYCHIATRIC HISTORY:   Past Medical History:   Diagnosis Date    Anxiety     Depression     Hyperlipidemia     Hypopotassemia     Lumbago     Osteopenia     PTSD (post-traumatic stress disorder)     Pulmonary embolism (HCC)     4 PE in bilat lungs    Spinal stenosis     congenital       FAMILY/SOCIAL HISTORY:  Family History   Problem Relation Age of Onset    Celiac Disease Neg Hx     Crohn's Disease Neg Hx      Social History     Socioeconomic History    Marital status:      Spouse name: Rosanne Priest    Number of children: 1    Years of education:  15    Highest education level: Associate degree: occupational, technical, or vocational program   Occupational History    Occupation: On disability     Comment: Used to work as a  and did odd jobs   Tobacco Use    Smoking status: Current Every Day Smoker     Packs/day: 0.50     Years: 40.00     Pack years: 20.00     Types: Cigarettes    Smokeless tobacco: Never Used   Vaping Use    Vaping Use: Never used   Substance and Sexual Activity    Alcohol use: No    Drug use: Yes     Types: Marijuana     Comment: Medical Marijuana; CBD drops     Sexual activity: Not on file   Other Topics Concern    Not on file   Social History Narrative    Lives at Home with her . [facility] in  [place]     Had 1 son who is now  approximately 3 years ago (heroine addiction). She denies any addiction in herself or her . But she is on [address]      Social Determinants of Health     Financial Resource Strain:     Difficulty of Paying Living Expenses:    Food Insecurity:     Worried About Running Out of Food in the Last Year:     [address] in the patient had a complete physical exam and blood work up  Patient was monitored closely with suicide precaution  Patient was started on Pamelor and discussed about ECT option  Was encouraged to participate in group and other milieu activity  Patient started to feel better with this combination of treatment. Significant progress in the symptoms since admission. Mood better, with the score of 2/10 - bad  No AVH or paranoid thoughts  No Hopeless or worthless feeling  No active SI/HI  Appetite:  [x] Normal  [] Increased  [] Decreased    Sleep:       [x] Normal  [] Fair       [] Poor            Energy:    [x] Normal  [] Increased  [] Decreased     SI [] Present  [x] Absent  HI  []Present  [x] Absent   Aggression:  [] yes  [] no  Patient is [x] able  [] unable to CONTRACT FOR SAFETY   Medication side effects(SE):  [x] None(Psych.  Meds.) [] Other      Mental Status Examination on discharge:    Level of consciousness:  within normal limits   Appearance:  well-appearing  Behavior/Motor:  no abnormalities noted  Attitude toward examiner:  attentive and good eye contact  Speech:  spontaneous, normal rate and normal volume   Mood: less depressed  Affect:  mood congruent  Thought processes:  linear   Thought content:  Suicidal Ideation:  denies suicidal ideation  Delusions:  no evidence of delusions  Perceptual Disturbance:  denies any perceptual disturbance  Cognition:  oriented to person, place, and time   Concentration intact  Memory intact  Insight good   Judgement fair   Fund of Knowledge adequate      ASSESSMENT:  Patient symptoms are:  [x] Well controlled  [x] Improving  [] Worsening  [] No change      Diagnosis:  Active Problems:    Anxiety and depression    Lumbago    Postlaminectomy syndrome, cervical region    Muscle weakness (generalized)    Tobacco use disorder    High risk medication use    Myalgia    Spinal stenosis    Medical marijuana use    Grief reaction with prolonged bereavement    PTSD (post-traumatic stress disorder)    Major depressive disorder, recurrent episode, moderate (HCC)    Opioid dependence with opioid-induced disorder (HCC)    DEBI (generalized anxiety disorder)    Major depressive disorder, recurrent severe without psychotic features ([facility] [address]    Chronic pain associated with significant psychosocial dysfunction    Suicide attempt by benzodiazepine overdose ([facility] [address]    Chronic neck pain with history of cervical spinal surgery  Resolved Problems:    Chronic pain disorder      LABS:    No results for input(s): WBC, HGB, PLT in the last 72 hours. No results for input(s): NA, K, CL, CO2, BUN, CREATININE, GLUCOSE in the last 72 hours. No results for input(s): BILITOT, ALKPHOS, AST, ALT in the last 72 hours. Lab Results   Component Value Date    LABAMPH Neg 08/28/2021    LABAMPH Neg 08/28/2021    BARBSCNU Neg 08/28/2021    BARBSCNU Neg 08/28/2021    LABBENZ Neg 08/28/2021    LABBENZ POSITIVE 08/28/2021    LABMETH Neg 08/28/2021    LABMETH Neg 08/28/2021    OPIATESCREENURINE Neg 08/28/2021    OPIATESCREENURINE POSITIVE 08/28/2021    PHENCYCLIDINESCREENURINE Neg 08/28/2021    PHENCYCLIDINESCREENURINE Neg 08/28/2021    ETOH <10 08/28/2021     Lab Results   Component Value Date    TSH 1.410 08/28/2021     No results found for: LITHIUM  No results found for: VALPROATE, CBMZ    RISK ASSESSMENT AT DISCHARGE: Low risk for suicide and homicide. Treatment Plan:  Reviewed current Medications with the patient. Education provided on the complaince with treatment. Choose to have ECT as OP after completing her pending work which will take few days to complete    Risks, benefits, side effects, drug-to-drug interactions and alternatives to treatment were discussed. Encourage patient to attend outpatient follow up appointment and therapy. Patient was advised to call the outpatient provider, visit the nearest ED or call 911 if symptoms are not manageable.      Patient's family member was contacted prior to the discharge. Medication List      START taking these medications    methocarbamol 750 MG tablet  Commonly known as: ROBAXIN  Take 1 tablet by mouth 2 times daily as needed (Ms spasm)     Narcan 4 MG/0.1ML Liqd nasal spray  Generic drug: naloxone  1 spray by Nasal route as needed for Opioid Reversal     nortriptyline 50 MG capsule  Commonly known as: PAMELOR  Take 1 capsule by mouth nightly        CONTINUE taking these medications    aspirin EC 81 MG EC tablet  Take 1 tablet by mouth daily     atorvastatin 10 MG tablet  Commonly known as: Lipitor  Take 1 tablet by mouth daily     Benefiber Powd     carvedilol 12.5 MG tablet  Commonly known as: COREG  Take 1 tablet by mouth 2 times daily     MULTI-VITAMIN DAILY PO     topiramate 100 MG tablet  Commonly known as:  Topamax  Take 1 tablet by mouth 2 times daily        STOP taking these medications    amitriptyline 100 MG tablet  Commonly known as: ELAVIL     VORTIoxetine 10 MG Tabs tablet  Commonly known as: Trintellix           Where to Get Your Medications      These medications were sent to [facility] [id number], [address] [place] [address] [address], [facility] [address]    Phone: [phone]   · methocarbamol 750 MG tablet  · Narcan 4 MG/0.1ML Liqd nasal spray  · nortriptyline 50 MG capsule           Reason for more than one antipsychotic:   [x] N/A  [] 3 failed monotherapy(drugs tried):  [] Cross over to a new antipsychotic  [] Taper to monotherapy from polypharmacy  [] Augmentation of Clozapine therapy due to treatment resistance to single therapy        TIME SPEND - 35 MINUTES TO COMPLETE THE EVALUATION, DISCHARGE SUMMARY, MEDICATION RECONCILIATION AND FOLLOW UP CARE     Signed:  Pamela Hawkins MD  9/1/2021  9:43 AM

## 2021-09-01 NOTE — PROGRESS NOTES
Pt. refused to attend the 1000 skills group, despite staff encouragement.  Electronically signed by Gilberto Harrison on 9/1/2021 at 12:33 PM

## 2021-09-02 ENCOUNTER — VIRTUAL VISIT (OUTPATIENT)
Dept: BEHAVIORAL/MENTAL HEALTH CLINIC | Age: 61
End: 2021-09-02
Payer: MEDICARE

## 2021-09-02 DIAGNOSIS — F33.1 MAJOR DEPRESSIVE DISORDER, RECURRENT EPISODE, MODERATE (HCC): Primary | ICD-10-CM

## 2021-09-02 DIAGNOSIS — F41.1 GAD (GENERALIZED ANXIETY DISORDER): ICD-10-CM

## 2021-09-02 PROCEDURE — 99214 OFFICE O/P EST MOD 30 MIN: CPT | Performed by: PSYCHIATRY & NEUROLOGY

## 2021-09-02 RX ORDER — LITHIUM CARBONATE 150 MG/1
150 CAPSULE ORAL NIGHTLY
Qty: 15 CAPSULE | Refills: 3 | Status: SHIPPED | OUTPATIENT
Start: 2021-09-02 | End: 2021-09-22 | Stop reason: SDUPTHER
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
## 2021-09-03 ENCOUNTER — OFFICE VISIT (OUTPATIENT)
Dept: FAMILY MEDICINE CLINIC | Age: 61
End: 2021-09-03
Payer: MEDICARE

## 2021-09-03 VITALS
WEIGHT: 141 LBS | DIASTOLIC BLOOD PRESSURE: 74 MMHG | OXYGEN SATURATION: 98 % | SYSTOLIC BLOOD PRESSURE: 120 MMHG | TEMPERATURE: 97 F | HEART RATE: 90 BPM | BODY MASS INDEX: 24.07 KG/M2 | HEIGHT: 64 IN

## 2021-09-03 DIAGNOSIS — L30.9 DERMATITIS: Primary | ICD-10-CM

## 2021-09-03 DIAGNOSIS — G89.4 CHRONIC PAIN DISORDER: ICD-10-CM

## 2021-09-03 DIAGNOSIS — M54.17 LUMBOSACRAL RADICULOPATHY DUE TO TRAUMA: ICD-10-CM

## 2021-09-03 DIAGNOSIS — M54.12 BRACHIAL NEURITIS OR RADICULITIS: ICD-10-CM

## 2021-09-03 PROCEDURE — 99214 OFFICE O/P EST MOD 30 MIN: CPT | Performed by: NURSE PRACTITIONER

## 2021-09-03 RX ORDER — METHYLPREDNISOLONE 4 MG/1
TABLET ORAL
Qty: 21 TABLET | Refills: 0 | Status: SHIPPED | OUTPATIENT
Start: 2021-09-03 | End: 2021-09-09

## 2021-09-03 RX ORDER — TOPIRAMATE 100 MG/1
100 TABLET, FILM COATED ORAL 2 TIMES DAILY
Qty: 180 TABLET | Refills: 0 | Status: SHIPPED | OUTPATIENT
Start: 2021-09-03 | End: 2022-03-22 | Stop reason: SDUPTHER

## 2021-09-03 RX ORDER — CLOTRIMAZOLE 1 %
CREAM (GRAM) TOPICAL
Qty: 24 G | Refills: 0 | Status: ON HOLD | OUTPATIENT
Start: 2021-09-03 | End: 2021-10-04

## 2021-09-03 RX ORDER — HYDROCODONE BITARTRATE AND ACETAMINOPHEN 7.5; 325 MG/1; MG/1
1 TABLET ORAL EVERY 8 HOURS PRN
Qty: 90 TABLET | Refills: 0 | Status: SHIPPED | OUTPATIENT
Start: 2021-09-03 | End: 2021-09-30 | Stop reason: SDUPTHER

## 2021-09-03 RX ORDER — HYDROCODONE BITARTRATE AND ACETAMINOPHEN 7.5; 325 MG/1; MG/1
1 TABLET ORAL EVERY 6 HOURS PRN
COMMUNITY
End: 2021-09-03 | Stop reason: SDUPTHER

## 2021-09-03 SDOH — ECONOMIC STABILITY: FOOD INSECURITY: WITHIN THE PAST 12 MONTHS, THE FOOD YOU BOUGHT JUST DIDN'T LAST AND YOU DIDN'T HAVE MONEY TO GET MORE.: NEVER TRUE

## 2021-09-03 SDOH — ECONOMIC STABILITY: FOOD INSECURITY: WITHIN THE PAST 12 MONTHS, YOU WORRIED THAT YOUR FOOD WOULD RUN OUT BEFORE YOU GOT MONEY TO BUY MORE.: NEVER TRUE

## 2021-09-03 ASSESSMENT — ENCOUNTER SYMPTOMS
CONSTIPATION: 0
COUGH: 0
SHORTNESS OF BREATH: 0
BACK PAIN: 0
DIARRHEA: 0
CHEST TIGHTNESS: 0
EYE PAIN: 0
COLOR CHANGE: 0
TROUBLE SWALLOWING: 0
ABDOMINAL PAIN: 0

## 2021-09-03 ASSESSMENT — SOCIAL DETERMINANTS OF HEALTH (SDOH): HOW HARD IS IT FOR YOU TO PAY FOR THE VERY BASICS LIKE FOOD, HOUSING, MEDICAL CARE, AND HEATING?: NOT HARD AT ALL

## 2021-09-07 NOTE — TELEPHONE ENCOUNTER
nortriptyline (PAMELOR) 50 MG capsule [[id number]        Patient c/o being unable to sleep while taking above Rx. States that last night she did not take and instead took amitriptyline (ELAVIL) 100 MG tablet . After taking she was able to get sleep. Asking if she can continue to take ELAVIL and no longer take PAMELOR. Please advise.

## 2021-09-09 ENCOUNTER — VIRTUAL VISIT (OUTPATIENT)
Dept: FAMILY MEDICINE CLINIC | Age: 61
End: 2021-09-09
Payer: MEDICARE

## 2021-09-09 DIAGNOSIS — L30.9 DERMATITIS: Primary | ICD-10-CM

## 2021-09-09 DIAGNOSIS — F33.2 MAJOR DEPRESSIVE DISORDER, RECURRENT SEVERE WITHOUT PSYCHOTIC FEATURES (HCC): ICD-10-CM

## 2021-09-09 DIAGNOSIS — G89.4 CHRONIC PAIN DISORDER: ICD-10-CM

## 2021-09-09 PROCEDURE — 99213 OFFICE O/P EST LOW 20 MIN: CPT | Performed by: NURSE PRACTITIONER

## 2021-09-09 NOTE — PROGRESS NOTES
2021    TELEHEALTH EVALUATION -- Audio/Visual (During [facility] public health emergency)    Due to COVID 19 outbreak, patient's office visit was converted to a virtual visit. Patient was contacted and agreed to proceed with a virtual visit via [email or web address]  The risks and benefits of converting to a virtual visit were discussed in light of the current infectious disease epidemic. Patient also understood that insurance coverage and co-pays are up to their individual insurance plans. HPI:    Get Wyman (:  1960) has requested an audio/video evaluation for the following concern(s):    Hospitalized recently for SI. Severe depression. Considering ECT. Seen for a rash last week. Given clotrimazole as well as medrol manju. Seems to be improving. Clearing up. Finishing up the steroids. Review of Systems   Psychiatric/Behavioral: Positive for dysphoric mood and sleep disturbance. The patient is nervous/anxious. Prior to Visit Medications    Medication Sig Taking? Authorizing Provider   topiramate (TOPAMAX) 100 MG tablet Take 1 tablet by mouth 2 times daily Yes RINA Michel CNP   HYDROcodone-acetaminophen (NORCO) 7.5-325 MG per tablet Take 1 tablet by mouth every 8 hours as needed for Pain for up to 30 days. Yes RINA Michel CNP   methylPREDNISolone (MEDROL DOSEPACK) 4 MG tablet Take by mouth. Yes RINA Michel CNP   clotrimazole (LOTRIMIN) 1 % cream Apply topically 2 times daily.  Yes RINA Michel CNP   lithium 150 MG capsule Take 1 capsule by mouth nightly Yes Jonathan Tierney MD   nortriptyline (PAMELOR) 50 MG capsule Take 1 capsule by mouth nightly Yes Lj Kaplan MD   naloxone (NARCAN) 4 MG/0.1ML LIQD nasal spray 1 spray by Nasal route as needed for Opioid Reversal Yes Gamal Hurley MD   methocarbamol (ROBAXIN) 750 MG tablet Take 1 tablet by mouth 2 times daily as needed (Ms spasm) Yes Gamal Hurley MD   carvedilol (COREG) 12.5 MG tablet Take 1 tablet by mouth 2 times daily Yes Clifton Johnson MD   aspirin EC 81 MG EC tablet Take 1 tablet by mouth daily Yes Clifton Johnson MD   atorvastatin (LIPITOR) 10 MG tablet Take 1 tablet by mouth daily Yes Tiffanie Heath, APRN - CNP   Multiple Vitamin (MULTI-VITAMIN DAILY PO) Take by mouth Yes Historical Provider, MD   Wheat Dextrin (BENEFIBER) POWD Take 4 g by mouth 3 times daily (with meals) Yes Historical Provider, MD       Social History     Tobacco Use    Smoking status: Current Every Day Smoker     Packs/day: 0.50     Years: 40.00     Pack years: 20.00     Types: Cigarettes    Smokeless tobacco: Never Used   Vaping Use    Vaping Use: Never used   Substance Use Topics    Alcohol use: No    Drug use: Yes     Types: Marijuana     Comment: Medical Marijuana; CBD drops         Allergies   Allergen Reactions    Latex Anaphylaxis     Rash    Fentanyl Rash     At site of application    Ketoprofen      Rash    Morphine Other (See Comments)     GI upset  Other reaction(s): GI Upset  GI upset    Nsaids     Tetracycline      Rash    Cefaclor Rash     Rash    Cymbalta [Duloxetine Hcl] Nausea And Vomiting    Tetracyclines & Related Rash   ,   Past Medical History:   Diagnosis Date    Anxiety     Depression     Hyperlipidemia     Hypopotassemia     Lumbago     Osteopenia     PTSD (post-traumatic stress disorder)     Pulmonary embolism (HCC)     4 PE in bilat lungs    Spinal stenosis     congenital   ,   Past Surgical History:   Procedure Laterality Date    HYSTERECTOMY      SPINE SURGERY      cervical x 4, fusions and reconstruction    TONSILLECTOMY      age 3   ,   Social History     Tobacco Use    Smoking status: Current Every Day Smoker     Packs/day: 0.50     Years: 40.00     Pack years: 20.00     Types: Cigarettes    Smokeless tobacco: Never Used   Vaping Use    Vaping Use: Never used   Substance Use Topics    Alcohol use: No    Drug use: Yes     Types: Marijuana Comment: Medical Marijuana; CBD drops    ,   Family History   Problem Relation Age of Onset    Celiac Disease Neg Hx     Crohn's Disease Neg Hx        PHYSICAL EXAMINATION:  [ INSTRUCTIONS:  \"[x]\" Indicates a positive item  \"[]\" Indicates a negative item  -- DELETE ALL ITEMS NOT EXAMINED]  [x] Alert  [x] Oriented to person/place/time    [] No apparent distress  [] Toxic appearing    [] Face flushed appearing [x] Sclera clear  [] Lips are cyanotic      [x] Breathing appears normal  [] Appears tachypneic      [] Rash on visible skin    [x] Cranial Nerves II-XII grossly intact    [x] Motor grossly intact in visible upper extremities    [x] Motor grossly intact in visible lower extremities    [x] Normal Mood  [] Anxious appearing    [] Depressed appearing  [] Confused appearing      [] Poor short term memory  [] Poor long term memory    [] OTHER:      Due to this being a TeleHealth encounter, evaluation of the following organ systems is limited: Vitals/Constitutional/EENT/Resp/CV/GI//MS/Neuro/Skin/Heme-Lymph-Imm. ASSESSMENT/PLAN:  1. Major depressive disorder, recurrent severe without psychotic features ([facility] [address]  - continue with current medications. Consider ECT. Fu with psychiatry. 2. Dermatitis  - FU if not continuing to improve. 3. Chronic pain disorder  - continue medications as prescribed. Side effects, adverse effects of the medication prescribed today, as well as treatment plan/ rationale and result expectations have been discussed with the patient who expresses understanding and desires to proceed. Close follow up to evaluate treatment results and for coordination of care. I have reviewed the patient's medical history in detail and updated the computerized patient record. As always, patient is advised that if symptoms worsen in any way they will proceed to the nearest emergency room. FU in 1 mos. An  electronic signature was used to authenticate this note.     --RINA Renner - CNP on 9/9/2021 at 1:48 PM        Pursuant to the emergency declaration under the [address] [place] [address], [facility] waiver authority and the "[employer] and [employer] Act, this Virtual  Visit was conducted, with patient's consent, to reduce the patient's risk of exposure to COVID-19 and provide continuity of care for an established patient. Services were provided through a video synchronous discussion virtually to substitute for in-person clinic visit.

## 2021-09-14 ENCOUNTER — TELEPHONE (OUTPATIENT)
Dept: BEHAVIORAL/MENTAL HEALTH CLINIC | Age: 61
End: 2021-09-14

## 2021-09-14 NOTE — TELEPHONE ENCOUNTER
lithium 150 MG capsule [[id number]]     Order Details  Dose: 150 mg Route: Oral Frequency: NIGHTLY   Dispense Quantity: 15 capsule Refills: 3          Sig: Take 1 capsule by mouth nightly     Patient states above Rx is \"probably\" working. Asking if she can have a new Rx for #30 instead of #15. Please advise.

## 2021-09-20 ENCOUNTER — TELEPHONE (OUTPATIENT)
Dept: PHARMACY | Facility: CLINIC | Age: 61
End: 2021-09-20

## 2021-09-21 NOTE — TELEPHONE ENCOUNTER
Hospital diagnoses: Major depressive disorder, recurrent episode; Suicide attempt     Attended 9/2 behavioral health appt, with another appt scheduled tomorrow. Also had visits with PCP office 9/3 and 9/9. Will not outreach at this time.

## 2021-09-22 ENCOUNTER — OFFICE VISIT (OUTPATIENT)
Dept: BEHAVIORAL/MENTAL HEALTH CLINIC | Age: 61
End: 2021-09-22
Payer: MEDICARE

## 2021-09-22 VITALS
BODY MASS INDEX: 24.41 KG/M2 | HEIGHT: 64 IN | HEART RATE: 87 BPM | SYSTOLIC BLOOD PRESSURE: 111 MMHG | WEIGHT: 143 LBS | DIASTOLIC BLOOD PRESSURE: 66 MMHG

## 2021-09-22 DIAGNOSIS — F33.1 MAJOR DEPRESSIVE DISORDER, RECURRENT EPISODE, MODERATE (HCC): Primary | ICD-10-CM

## 2021-09-22 DIAGNOSIS — F41.1 GAD (GENERALIZED ANXIETY DISORDER): ICD-10-CM

## 2021-09-22 PROCEDURE — 99214 OFFICE O/P EST MOD 30 MIN: CPT | Performed by: PSYCHIATRY & NEUROLOGY

## 2021-09-22 RX ORDER — AMITRIPTYLINE HYDROCHLORIDE 75 MG/1
75 TABLET, FILM COATED ORAL NIGHTLY
Qty: 30 TABLET | Refills: 3 | Status: SHIPPED | OUTPATIENT
Start: 2021-09-22 | End: 2021-11-11

## 2021-09-22 RX ORDER — LITHIUM CARBONATE 150 MG/1
150 CAPSULE ORAL NIGHTLY
Qty: 30 CAPSULE | Refills: 3 | Status: SHIPPED | OUTPATIENT
Start: 2021-09-22 | End: 2021-11-11 | Stop reason: SDUPTHER
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
## 2021-09-27 RX ORDER — CARVEDILOL 12.5 MG/1
12.5 TABLET ORAL 2 TIMES DAILY
Qty: 60 TABLET | Refills: 5 | Status: SHIPPED | OUTPATIENT
Start: 2021-09-27 | End: 2022-03-28

## 2021-09-30 ENCOUNTER — OFFICE VISIT (OUTPATIENT)
Dept: FAMILY MEDICINE CLINIC | Age: 61
End: 2021-09-30
Payer: MEDICARE

## 2021-09-30 VITALS
WEIGHT: 142 LBS | DIASTOLIC BLOOD PRESSURE: 82 MMHG | HEART RATE: 88 BPM | BODY MASS INDEX: 24.24 KG/M2 | SYSTOLIC BLOOD PRESSURE: 112 MMHG | HEIGHT: 64 IN | OXYGEN SATURATION: 98 %

## 2021-09-30 DIAGNOSIS — G89.4 CHRONIC PAIN DISORDER: ICD-10-CM

## 2021-09-30 DIAGNOSIS — M54.17 LUMBOSACRAL RADICULOPATHY DUE TO TRAUMA: ICD-10-CM

## 2021-09-30 DIAGNOSIS — F33.2 MAJOR DEPRESSIVE DISORDER, RECURRENT SEVERE WITHOUT PSYCHOTIC FEATURES (HCC): Primary | ICD-10-CM

## 2021-09-30 DIAGNOSIS — E78.5 HYPERLIPIDEMIA, UNSPECIFIED HYPERLIPIDEMIA TYPE: ICD-10-CM

## 2021-09-30 DIAGNOSIS — Z87.891 PERSONAL HISTORY OF TOBACCO USE: ICD-10-CM

## 2021-09-30 DIAGNOSIS — Z01.818 PREOPERATIVE CLEARANCE: ICD-10-CM

## 2021-09-30 PROCEDURE — 99214 OFFICE O/P EST MOD 30 MIN: CPT | Performed by: NURSE PRACTITIONER

## 2021-09-30 PROCEDURE — G0296 VISIT TO DETERM LDCT ELIG: HCPCS | Performed by: NURSE PRACTITIONER

## 2021-09-30 RX ORDER — ATORVASTATIN CALCIUM 10 MG/1
10 TABLET, FILM COATED ORAL DAILY
Qty: 90 TABLET | Refills: 1 | Status: SHIPPED | OUTPATIENT
Start: 2021-09-30 | End: 2022-01-17 | Stop reason: SDUPTHER

## 2021-09-30 RX ORDER — HYDROCODONE BITARTRATE AND ACETAMINOPHEN 7.5; 325 MG/1; MG/1
1 TABLET ORAL EVERY 8 HOURS PRN
Qty: 90 TABLET | Refills: 0 | Status: SHIPPED | OUTPATIENT
Start: 2021-09-30 | End: 2021-12-02 | Stop reason: SDUPTHER

## 2021-09-30 ASSESSMENT — ENCOUNTER SYMPTOMS
SHORTNESS OF BREATH: 0
COUGH: 0
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
## 2021-10-04 ENCOUNTER — HOSPITAL ENCOUNTER (OUTPATIENT)
Dept: POSTOP/PACU | Age: 61
Discharge: HOME | End: 2021-10-04
Attending: PSYCHIATRY & NEUROLOGY | Admitting: PSYCHIATRY & NEUROLOGY
Payer: MEDICARE

## 2021-10-04 ENCOUNTER — ANESTHESIA EVENT (OUTPATIENT)
Dept: POSTOP/PACU | Age: 61
End: 2021-10-04
Payer: MEDICARE

## 2021-10-04 ENCOUNTER — ANESTHESIA (OUTPATIENT)
Dept: POSTOP/PACU | Age: 61
End: 2021-10-04
Payer: MEDICARE

## 2021-10-04 VITALS
BODY MASS INDEX: 23.9 KG/M2 | DIASTOLIC BLOOD PRESSURE: 69 MMHG | HEIGHT: 64 IN | TEMPERATURE: 98.3 F | HEART RATE: 66 BPM | SYSTOLIC BLOOD PRESSURE: 131 MMHG | OXYGEN SATURATION: 100 % | WEIGHT: 140 LBS | RESPIRATION RATE: 13 BRPM

## 2021-10-04 VITALS
OXYGEN SATURATION: 100 % | DIASTOLIC BLOOD PRESSURE: 79 MMHG | RESPIRATION RATE: 20 BRPM | SYSTOLIC BLOOD PRESSURE: 134 MMHG

## 2021-10-04 LAB — SARS-COV-2, NAAT: NOT DETECTED

## 2021-10-04 PROCEDURE — 2580000003 HC RX 258: Performed by: PSYCHIATRY & NEUROLOGY

## 2021-10-04 PROCEDURE — 6360000002 HC RX W HCPCS: Performed by: STUDENT IN AN ORGANIZED HEALTH CARE EDUCATION/TRAINING PROGRAM

## 2021-10-04 PROCEDURE — 3700000000 HC ANESTHESIA ATTENDED CARE

## 2021-10-04 PROCEDURE — 7100000001 HC PACU RECOVERY - ADDTL 15 MIN

## 2021-10-04 PROCEDURE — 87635 SARS-COV-2 COVID-19 AMP PRB: CPT

## 2021-10-04 PROCEDURE — 2500000003 HC RX 250 WO HCPCS: Performed by: STUDENT IN AN ORGANIZED HEALTH CARE EDUCATION/TRAINING PROGRAM

## 2021-10-04 PROCEDURE — 90870 ELECTROCONVULSIVE THERAPY: CPT

## 2021-10-04 PROCEDURE — 7100000000 HC PACU RECOVERY - FIRST 15 MIN

## 2021-10-04 PROCEDURE — 90870 ELECTROCONVULSIVE THERAPY: CPT | Performed by: PSYCHIATRY & NEUROLOGY

## 2021-10-04 RX ORDER — SODIUM CHLORIDE 0.9 % (FLUSH) 0.9 %
5-40 SYRINGE (ML) INJECTION EVERY 12 HOURS SCHEDULED
Status: CANCELLED | OUTPATIENT
Start: 2021-10-04

## 2021-10-04 RX ORDER — SODIUM CHLORIDE 9 MG/ML
INJECTION, SOLUTION INTRAVENOUS CONTINUOUS
Status: CANCELLED | OUTPATIENT
Start: 2021-10-04

## 2021-10-04 RX ORDER — ONDANSETRON 2 MG/ML
4 INJECTION INTRAMUSCULAR; INTRAVENOUS
Status: DISCONTINUED | OUTPATIENT
Start: 2021-10-04 | End: 2021-10-04 | Stop reason: HOSPADM

## 2021-10-04 RX ORDER — SODIUM CHLORIDE 9 MG/ML
25 INJECTION, SOLUTION INTRAVENOUS PRN
Status: CANCELLED | OUTPATIENT
Start: 2021-10-04

## 2021-10-04 RX ORDER — SODIUM CHLORIDE 0.9 % (FLUSH) 0.9 %
5-40 SYRINGE (ML) INJECTION PRN
Status: CANCELLED | OUTPATIENT
Start: 2021-10-04

## 2021-10-04 RX ORDER — ONDANSETRON 2 MG/ML
INJECTION INTRAMUSCULAR; INTRAVENOUS PRN
Status: DISCONTINUED | OUTPATIENT
Start: 2021-10-04 | End: 2021-10-04 | Stop reason: SDUPTHER

## 2021-10-04 RX ORDER — LIDOCAINE HYDROCHLORIDE 10 MG/ML
1 INJECTION, SOLUTION EPIDURAL; INFILTRATION; INTRACAUDAL; PERINEURAL
Status: CANCELLED | OUTPATIENT
Start: 2021-10-04 | End: 2021-10-04

## 2021-10-04 RX ORDER — GLYCOPYRROLATE 1 MG/5 ML
SYRINGE (ML) INTRAVENOUS PRN
Status: DISCONTINUED | OUTPATIENT
Start: 2021-10-04 | End: 2021-10-04 | Stop reason: SDUPTHER

## 2021-10-04 RX ORDER — SUCCINYLCHOLINE/SOD CL,ISO/PF 100 MG/5ML
SYRINGE (ML) INTRAVENOUS PRN
Status: DISCONTINUED | OUTPATIENT
Start: 2021-10-04 | End: 2021-10-04 | Stop reason: SDUPTHER

## 2021-10-04 RX ORDER — PROPOFOL 10 MG/ML
INJECTION, EMULSION INTRAVENOUS PRN
Status: DISCONTINUED | OUTPATIENT
Start: 2021-10-04 | End: 2021-10-04 | Stop reason: SDUPTHER

## 2021-10-04 RX ORDER — SODIUM CHLORIDE 9 MG/ML
INJECTION, SOLUTION INTRAVENOUS CONTINUOUS
Status: DISCONTINUED | OUTPATIENT
Start: 2021-10-04 | End: 2021-10-04 | Stop reason: HOSPADM

## 2021-10-04 RX ADMIN — Medication 40 MG: at 12:27

## 2021-10-04 RX ADMIN — Medication 0.2 MG: at 12:24

## 2021-10-04 RX ADMIN — SODIUM CHLORIDE 500 ML/HR: 9 INJECTION, SOLUTION INTRAVENOUS at 10:30

## 2021-10-04 RX ADMIN — ONDANSETRON 4 MG: 2 INJECTION INTRAMUSCULAR; INTRAVENOUS at 12:24

## 2021-10-04 RX ADMIN — PROPOFOL 100 MG: 10 INJECTION, EMULSION INTRAVENOUS at 12:27

## 2021-10-04 ASSESSMENT — LIFESTYLE VARIABLES: SMOKING_STATUS: 1

## 2021-10-04 ASSESSMENT — PATIENT HEALTH QUESTIONNAIRE - PHQ9: SUM OF ALL RESPONSES TO PHQ QUESTIONS 1-9: 11
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
## 2021-10-04 NOTE — BH NOTE
Pt denies SI, HI, AVH, slef harm thoughts or behaviors. Admits to PDW thoughts daily that last several hours at a time. States she sleeps 7 or more hours a night, feels rested ad does not nap during the day. Anxiety 6/10. Depression 8/10. Pt is hyper-verbal, friendly.

## 2021-10-04 NOTE — ANESTHESIA POSTPROCEDURE EVALUATION
Department of Anesthesiology  Postprocedure Note    Patient: Maureen Jansen  MRN: [id number]  YOB: 1960  Date of evaluation: 10/4/2021  Time:  12:30 PM     Procedure Summary     Date: 10/04/21 Room / Location: [facility] [place] PACU    Anesthesia Start: 1224 Anesthesia Stop:     Procedure: ECT W/ ANESTHESIA Diagnosis:     Scheduled Providers:  Responsible Provider: Lei Wilkes DO    Anesthesia Type: general ASA Status: 3          Anesthesia Type: general    Mirtha Phase I:   10    Mirtha Phase II:      Last vitals: Reviewed and per EMR flowsheets.        Anesthesia Post Evaluation    Patient location during evaluation: bedside  Patient participation: complete - patient participated  Level of consciousness: awake and awake and alert  Pain score: 0  Airway patency: patent  Nausea & Vomiting: no nausea and no vomiting  Complications: no  Cardiovascular status: blood pressure returned to baseline and hemodynamically stable  Respiratory status: acceptable  Hydration status: euvolemic

## 2021-10-06 ENCOUNTER — ANESTHESIA (OUTPATIENT)
Dept: POSTOP/PACU | Age: 61
End: 2021-10-06
Payer: MEDICARE

## 2021-10-06 ENCOUNTER — ANESTHESIA EVENT (OUTPATIENT)
Dept: POSTOP/PACU | Age: 61
End: 2021-10-06
Payer: MEDICARE

## 2021-10-06 ENCOUNTER — HOSPITAL ENCOUNTER (OUTPATIENT)
Dept: POSTOP/PACU | Age: 61
Discharge: HOME | End: 2021-10-06
Attending: PSYCHIATRY & NEUROLOGY | Admitting: PSYCHIATRY & NEUROLOGY
Payer: MEDICARE

## 2021-10-06 VITALS
SYSTOLIC BLOOD PRESSURE: 121 MMHG | RESPIRATION RATE: 20 BRPM | OXYGEN SATURATION: 100 % | DIASTOLIC BLOOD PRESSURE: 66 MMHG

## 2021-10-06 VITALS
SYSTOLIC BLOOD PRESSURE: 146 MMHG | HEART RATE: 65 BPM | OXYGEN SATURATION: 100 % | RESPIRATION RATE: 12 BRPM | DIASTOLIC BLOOD PRESSURE: 75 MMHG | TEMPERATURE: 97.4 F

## 2021-10-06 PROCEDURE — 2500000003 HC RX 250 WO HCPCS: Performed by: STUDENT IN AN ORGANIZED HEALTH CARE EDUCATION/TRAINING PROGRAM

## 2021-10-06 PROCEDURE — 2580000003 HC RX 258: Performed by: STUDENT IN AN ORGANIZED HEALTH CARE EDUCATION/TRAINING PROGRAM

## 2021-10-06 PROCEDURE — 7100000001 HC PACU RECOVERY - ADDTL 15 MIN

## 2021-10-06 PROCEDURE — 6360000002 HC RX W HCPCS: Performed by: STUDENT IN AN ORGANIZED HEALTH CARE EDUCATION/TRAINING PROGRAM

## 2021-10-06 PROCEDURE — 90870 ELECTROCONVULSIVE THERAPY: CPT | Performed by: PSYCHIATRY & NEUROLOGY

## 2021-10-06 PROCEDURE — 3700000000 HC ANESTHESIA ATTENDED CARE

## 2021-10-06 PROCEDURE — 90870 ELECTROCONVULSIVE THERAPY: CPT

## 2021-10-06 PROCEDURE — 7100000000 HC PACU RECOVERY - FIRST 15 MIN

## 2021-10-06 RX ORDER — PROPOFOL 10 MG/ML
INJECTION, EMULSION INTRAVENOUS PRN
Status: DISCONTINUED | OUTPATIENT
Start: 2021-10-06 | End: 2021-10-06 | Stop reason: SDUPTHER

## 2021-10-06 RX ORDER — SODIUM CHLORIDE 9 MG/ML
25 INJECTION, SOLUTION INTRAVENOUS PRN
Status: DISCONTINUED | OUTPATIENT
Start: 2021-10-06 | End: 2021-10-06 | Stop reason: HOSPADM

## 2021-10-06 RX ORDER — ONDANSETRON 2 MG/ML
4 INJECTION INTRAMUSCULAR; INTRAVENOUS
Status: COMPLETED | OUTPATIENT
Start: 2021-10-06 | End: 2021-10-06

## 2021-10-06 RX ORDER — LIDOCAINE HYDROCHLORIDE 10 MG/ML
1 INJECTION, SOLUTION EPIDURAL; INFILTRATION; INTRACAUDAL; PERINEURAL
Status: DISCONTINUED | OUTPATIENT
Start: 2021-10-06 | End: 2021-10-06 | Stop reason: HOSPADM

## 2021-10-06 RX ORDER — SODIUM CHLORIDE 0.9 % (FLUSH) 0.9 %
5-40 SYRINGE (ML) INJECTION EVERY 12 HOURS SCHEDULED
Status: DISCONTINUED | OUTPATIENT
Start: 2021-10-06 | End: 2021-10-06 | Stop reason: HOSPADM

## 2021-10-06 RX ORDER — SODIUM CHLORIDE 0.9 % (FLUSH) 0.9 %
5-40 SYRINGE (ML) INJECTION PRN
Status: CANCELLED | OUTPATIENT
Start: 2021-10-06

## 2021-10-06 RX ORDER — SODIUM CHLORIDE 9 MG/ML
INJECTION, SOLUTION INTRAVENOUS CONTINUOUS
Status: DISCONTINUED | OUTPATIENT
Start: 2021-10-06 | End: 2021-10-06 | Stop reason: HOSPADM

## 2021-10-06 RX ORDER — SODIUM CHLORIDE 0.9 % (FLUSH) 0.9 %
5-40 SYRINGE (ML) INJECTION EVERY 12 HOURS SCHEDULED
Status: CANCELLED | OUTPATIENT
Start: 2021-10-06

## 2021-10-06 RX ORDER — SODIUM CHLORIDE 9 MG/ML
INJECTION, SOLUTION INTRAVENOUS CONTINUOUS
Status: CANCELLED | OUTPATIENT
Start: 2021-10-06

## 2021-10-06 RX ORDER — SODIUM CHLORIDE 9 MG/ML
25 INJECTION, SOLUTION INTRAVENOUS PRN
Status: CANCELLED | OUTPATIENT
Start: 2021-10-06

## 2021-10-06 RX ORDER — GLYCOPYRROLATE 1 MG/5 ML
SYRINGE (ML) INTRAVENOUS PRN
Status: DISCONTINUED | OUTPATIENT
Start: 2021-10-06 | End: 2021-10-06 | Stop reason: SDUPTHER

## 2021-10-06 RX ORDER — SODIUM CHLORIDE 0.9 % (FLUSH) 0.9 %
5-40 SYRINGE (ML) INJECTION PRN
Status: DISCONTINUED | OUTPATIENT
Start: 2021-10-06 | End: 2021-10-06 | Stop reason: HOSPADM

## 2021-10-06 RX ORDER — SUCCINYLCHOLINE/SOD CL,ISO/PF 100 MG/5ML
SYRINGE (ML) INTRAVENOUS PRN
Status: DISCONTINUED | OUTPATIENT
Start: 2021-10-06 | End: 2021-10-06 | Stop reason: SDUPTHER

## 2021-10-06 RX ADMIN — PROPOFOL 50 MG: 10 INJECTION, EMULSION INTRAVENOUS at 11:52

## 2021-10-06 RX ADMIN — SODIUM CHLORIDE: 9 INJECTION, SOLUTION INTRAVENOUS at 10:29

## 2021-10-06 RX ADMIN — ONDANSETRON 4 MG: 2 INJECTION INTRAMUSCULAR; INTRAVENOUS at 11:49

## 2021-10-06 RX ADMIN — Medication 40 MG: at 11:52

## 2021-10-06 RX ADMIN — Medication 0.2 MG: at 11:48

## 2021-10-06 ASSESSMENT — LIFESTYLE VARIABLES: SMOKING_STATUS: 1
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
## 2021-10-06 NOTE — H&P
Update History & Physical    The patient's History and Physical of  was reviewed with the patient and there were no significant changes. I examined the patient and there were no significant changes from the previous History and Physical.    Vitals:    10/06/21 1015   BP: 133/80   Pulse: 60   Resp: 11   SpO2: 99%     Principal Problem:    Major depressive disorder, recurrent severe without psychotic features ([address]  Resolved Problems:    * No resolved hospital problems. *        Plan: The risk, benefits, expected outcome, and alternative to the recommended procedure have been discussed with the patient. Patient understands and wants to proceed with the procedure.     Electronically signed by Kelley Vuong MD on 10/6/21 at 11:57 AM EDT

## 2021-10-06 NOTE — ANESTHESIA POSTPROCEDURE EVALUATION
Department of Anesthesiology  Postprocedure Note    Patient: Eben Hopkins  MRN: [id number]  YOB: 1960  Date of evaluation: 10/6/2021  Time:  11:56 AM     Procedure Summary     Date: 10/06/21 Room / Location: [facility] [place] PACU    Anesthesia Start: 1149 Anesthesia Stop:     Procedure: ECT W/ ANESTHESIA Diagnosis:     Scheduled Providers:  Responsible Provider: Berna Gomez DO    Anesthesia Type: general ASA Status: 2          Anesthesia Type: General    Mirtha Phase I:   10    Mirtha Phase II:      Last vitals: Reviewed and per EMR flowsheets.        Anesthesia Post Evaluation    Patient location during evaluation: bedside  Patient participation: complete - patient participated  Level of consciousness: awake and awake and alert  Pain score: 0  Airway patency: patent  Nausea & Vomiting: no nausea and no vomiting  Complications: no  Cardiovascular status: blood pressure returned to baseline and hemodynamically stable  Respiratory status: acceptable  Hydration status: euvolemic

## 2021-10-08 ENCOUNTER — HOSPITAL ENCOUNTER (OUTPATIENT)
Dept: POSTOP/PACU | Age: 61
Discharge: HOME | End: 2021-10-08
Attending: PSYCHIATRY & NEUROLOGY | Admitting: PSYCHIATRY & NEUROLOGY
Payer: MEDICARE

## 2021-10-08 ENCOUNTER — ANESTHESIA EVENT (OUTPATIENT)
Dept: POSTOP/PACU | Age: 61
End: 2021-10-08
Payer: MEDICARE

## 2021-10-08 ENCOUNTER — ANESTHESIA (OUTPATIENT)
Dept: POSTOP/PACU | Age: 61
End: 2021-10-08
Payer: MEDICARE

## 2021-10-08 VITALS
RESPIRATION RATE: 11 BRPM | BODY MASS INDEX: 23.9 KG/M2 | TEMPERATURE: 97 F | HEART RATE: 63 BPM | SYSTOLIC BLOOD PRESSURE: 131 MMHG | OXYGEN SATURATION: 98 % | WEIGHT: 140 LBS | HEIGHT: 64 IN | DIASTOLIC BLOOD PRESSURE: 60 MMHG

## 2021-10-08 VITALS
RESPIRATION RATE: 16 BRPM | OXYGEN SATURATION: 99 % | SYSTOLIC BLOOD PRESSURE: 167 MMHG | DIASTOLIC BLOOD PRESSURE: 77 MMHG

## 2021-10-08 LAB — SARS-COV-2, NAAT: NOT DETECTED

## 2021-10-08 PROCEDURE — 90870 ELECTROCONVULSIVE THERAPY: CPT | Performed by: PSYCHIATRY & NEUROLOGY

## 2021-10-08 PROCEDURE — 6360000002 HC RX W HCPCS: Performed by: STUDENT IN AN ORGANIZED HEALTH CARE EDUCATION/TRAINING PROGRAM

## 2021-10-08 PROCEDURE — 87635 SARS-COV-2 COVID-19 AMP PRB: CPT

## 2021-10-08 PROCEDURE — 2580000003 HC RX 258: Performed by: STUDENT IN AN ORGANIZED HEALTH CARE EDUCATION/TRAINING PROGRAM

## 2021-10-08 PROCEDURE — 3700000000 HC ANESTHESIA ATTENDED CARE

## 2021-10-08 PROCEDURE — 7100000000 HC PACU RECOVERY - FIRST 15 MIN

## 2021-10-08 PROCEDURE — 90870 ELECTROCONVULSIVE THERAPY: CPT

## 2021-10-08 PROCEDURE — 2500000003 HC RX 250 WO HCPCS: Performed by: STUDENT IN AN ORGANIZED HEALTH CARE EDUCATION/TRAINING PROGRAM

## 2021-10-08 PROCEDURE — 7100000001 HC PACU RECOVERY - ADDTL 15 MIN

## 2021-10-08 RX ORDER — SODIUM CHLORIDE 0.9 % (FLUSH) 0.9 %
5-40 SYRINGE (ML) INJECTION EVERY 12 HOURS SCHEDULED
Status: CANCELLED | OUTPATIENT
Start: 2021-10-08

## 2021-10-08 RX ORDER — SODIUM CHLORIDE 9 MG/ML
INJECTION, SOLUTION INTRAVENOUS CONTINUOUS
Status: DISCONTINUED | OUTPATIENT
Start: 2021-10-08 | End: 2021-10-08 | Stop reason: HOSPADM

## 2021-10-08 RX ORDER — SODIUM CHLORIDE 0.9 % (FLUSH) 0.9 %
5-40 SYRINGE (ML) INJECTION PRN
Status: DISCONTINUED | OUTPATIENT
Start: 2021-10-08 | End: 2021-10-08 | Stop reason: HOSPADM

## 2021-10-08 RX ORDER — SODIUM CHLORIDE 0.9 % (FLUSH) 0.9 %
5-40 SYRINGE (ML) INJECTION EVERY 12 HOURS SCHEDULED
Status: DISCONTINUED | OUTPATIENT
Start: 2021-10-08 | End: 2021-10-08 | Stop reason: HOSPADM

## 2021-10-08 RX ORDER — LIDOCAINE HYDROCHLORIDE 10 MG/ML
1 INJECTION, SOLUTION EPIDURAL; INFILTRATION; INTRACAUDAL; PERINEURAL
Status: CANCELLED | OUTPATIENT
Start: 2021-10-08 | End: 2021-10-08

## 2021-10-08 RX ORDER — SODIUM CHLORIDE 9 MG/ML
INJECTION, SOLUTION INTRAVENOUS CONTINUOUS
Status: CANCELLED | OUTPATIENT
Start: 2021-10-08

## 2021-10-08 RX ORDER — GLYCOPYRROLATE 1 MG/5 ML
SYRINGE (ML) INTRAVENOUS PRN
Status: DISCONTINUED | OUTPATIENT
Start: 2021-10-08 | End: 2021-10-08 | Stop reason: SDUPTHER

## 2021-10-08 RX ORDER — SODIUM CHLORIDE 0.9 % (FLUSH) 0.9 %
5-40 SYRINGE (ML) INJECTION PRN
Status: CANCELLED | OUTPATIENT
Start: 2021-10-08

## 2021-10-08 RX ORDER — ONDANSETRON 2 MG/ML
4 INJECTION INTRAMUSCULAR; INTRAVENOUS
Status: COMPLETED | OUTPATIENT
Start: 2021-10-08 | End: 2021-10-08

## 2021-10-08 RX ORDER — SODIUM CHLORIDE 9 MG/ML
25 INJECTION, SOLUTION INTRAVENOUS PRN
Status: DISCONTINUED | OUTPATIENT
Start: 2021-10-08 | End: 2021-10-08 | Stop reason: HOSPADM

## 2021-10-08 RX ORDER — SODIUM CHLORIDE 9 MG/ML
25 INJECTION, SOLUTION INTRAVENOUS PRN
Status: CANCELLED | OUTPATIENT
Start: 2021-10-08

## 2021-10-08 RX ADMIN — Medication 0.2 MG: at 08:20

## 2021-10-08 RX ADMIN — SODIUM CHLORIDE: 9 INJECTION, SOLUTION INTRAVENOUS at 06:41

## 2021-10-08 RX ADMIN — ONDANSETRON 4 MG: 2 INJECTION INTRAMUSCULAR; INTRAVENOUS at 08:20

## 2021-10-08 ASSESSMENT — PATIENT HEALTH QUESTIONNAIRE - PHQ9: SUM OF ALL RESPONSES TO PHQ QUESTIONS 1-9: 7

## 2021-10-08 ASSESSMENT — PAIN - FUNCTIONAL ASSESSMENT: PAIN_FUNCTIONAL_ASSESSMENT: 0-10

## 2021-10-08 NOTE — OP NOTE
Department of Psychiatry  Electroconvulsive Therapy Treatment Note        10/8/2021    PURPOSE FOR ECT:  Therapeutic NUMBER: 3    DIAGNOSIS:   Principal Problem:    Major depressive disorder, recurrent severe without psychotic features ([facility]  Resolved Problems:    * No resolved hospital problems. *      MEDICATIONS:    Current Facility-Administered Medications:     0.9 % sodium chloride infusion, , IntraVENous, Continuous, Nomi Ragland MD    0.9 % sodium chloride infusion, , IntraVENous, Continuous, Ryan Vitalec, DO, Last Rate: 125 mL/hr at 10/08/21 0641, New Bag at 10/08/21 0641    0.9 % sodium chloride infusion, 25 mL, IntraVENous, PRN, Ryan Orantes Roxanne, DO    sodium chloride flush 0.9 % injection 5-40 mL, 5-40 mL, IntraVENous, 2 times per day, Alex Sagastume,     sodium chloride flush 0.9 % injection 5-40 mL, 5-40 mL, IntraVENous, PRN, Tiff Ice K Roxanne, DO    ondansetron (ZOFRAN) injection 4 mg, 4 mg, IntraVENous, Once PRN, Alex Sagastume DO    CHANGE IN PSYCHIATRY STATUS SINCE LAST ECT:   Starting to notice improvement    INFORMED CONSENT OBTAINED : YES    PRE ECT MEDICATION ADMINISTERED:   YES    NPO STATUS: Confirmed    ELECTRODE PLACEMENT : RUL    TIME OUT :  TEAM CONFIRMS THE CORRECT PATIENT, CORRECT PROCEDURE AND CORRECT SITE.     DEVICE PARAMETERS:   Charge- 172  PW - 0.3  Freq- 45  Duration- 8  EEG-39  Motor-  28    VITALS:   Vitals:    10/08/21 0749   BP: 125/61   Pulse: 53   Resp: 20   Temp: 97 °F (36.1 °C)   SpO2: 91%         COMPLICATIONS: no      RECOMMENDATIONS FOR NEXT TREATMENT:  mon        PSYCHIATRIST:  Deanne Quintana MD
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
## 2021-10-08 NOTE — ANESTHESIA POSTPROCEDURE EVALUATION
Department of Anesthesiology  Postprocedure Note    Patient: Amarilis Herrera  MRN: [id number]  YOB: 1960  Date of evaluation: 10/8/2021  Time:  8:28 AM     Procedure Summary     Date: 10/08/21 Room / Location: [address] [place] [address] PACU    Anesthesia Start: 0820 Anesthesia Stop:     Procedure: ECT W/ ANESTHESIA Diagnosis:     Scheduled Providers: Fred Franco DO Responsible Provider: Fred Franco DO    Anesthesia Type: general ASA Status: 3          Anesthesia Type: General    Mirtha Phase I: Mirtha Score: 10    Mirtha Phase II:      Last vitals: Reviewed and per EMR flowsheets.        Anesthesia Post Evaluation    Patient location during evaluation: bedside  Patient participation: complete - patient participated  Level of consciousness: awake and awake and alert  Pain score: 0  Airway patency: patent  Nausea & Vomiting: no nausea and no vomiting  Complications: no  Cardiovascular status: blood pressure returned to baseline and hemodynamically stable  Respiratory status: acceptable  Hydration status: euvolemic

## 2021-10-11 ENCOUNTER — ANESTHESIA EVENT (OUTPATIENT)
Dept: POSTOP/PACU | Age: 61
End: 2021-10-11
Payer: MEDICARE

## 2021-10-11 ENCOUNTER — HOSPITAL ENCOUNTER (OUTPATIENT)
Dept: POSTOP/PACU | Age: 61
Discharge: HOME | End: 2021-10-11
Attending: PSYCHIATRY & NEUROLOGY | Admitting: PSYCHIATRY & NEUROLOGY
Payer: MEDICARE

## 2021-10-11 ENCOUNTER — ANESTHESIA (OUTPATIENT)
Dept: POSTOP/PACU | Age: 61
End: 2021-10-11
Payer: MEDICARE

## 2021-10-11 VITALS
SYSTOLIC BLOOD PRESSURE: 135 MMHG | WEIGHT: 140 LBS | HEART RATE: 64 BPM | RESPIRATION RATE: 15 BRPM | BODY MASS INDEX: 24.8 KG/M2 | OXYGEN SATURATION: 99 % | TEMPERATURE: 97.8 F | DIASTOLIC BLOOD PRESSURE: 84 MMHG | HEIGHT: 63 IN

## 2021-10-11 VITALS
DIASTOLIC BLOOD PRESSURE: 83 MMHG | RESPIRATION RATE: 19 BRPM | OXYGEN SATURATION: 100 % | SYSTOLIC BLOOD PRESSURE: 145 MMHG

## 2021-10-11 PROCEDURE — 6360000002 HC RX W HCPCS: Performed by: ANESTHESIOLOGY

## 2021-10-11 PROCEDURE — 2580000003 HC RX 258: Performed by: STUDENT IN AN ORGANIZED HEALTH CARE EDUCATION/TRAINING PROGRAM

## 2021-10-11 PROCEDURE — 7100000000 HC PACU RECOVERY - FIRST 15 MIN

## 2021-10-11 PROCEDURE — 2500000003 HC RX 250 WO HCPCS: Performed by: ANESTHESIOLOGY

## 2021-10-11 PROCEDURE — 90870 ELECTROCONVULSIVE THERAPY: CPT | Performed by: PSYCHIATRY & NEUROLOGY

## 2021-10-11 PROCEDURE — 90870 ELECTROCONVULSIVE THERAPY: CPT

## 2021-10-11 PROCEDURE — 3700000000 HC ANESTHESIA ATTENDED CARE

## 2021-10-11 PROCEDURE — 7100000001 HC PACU RECOVERY - ADDTL 15 MIN

## 2021-10-11 RX ORDER — SODIUM CHLORIDE 9 MG/ML
25 INJECTION, SOLUTION INTRAVENOUS PRN
Status: DISCONTINUED | OUTPATIENT
Start: 2021-10-11 | End: 2021-10-11 | Stop reason: HOSPADM

## 2021-10-11 RX ORDER — GLYCOPYRROLATE 1 MG/5 ML
SYRINGE (ML) INTRAVENOUS PRN
Status: DISCONTINUED | OUTPATIENT
Start: 2021-10-11 | End: 2021-10-11 | Stop reason: SDUPTHER

## 2021-10-11 RX ORDER — SODIUM CHLORIDE 0.9 % (FLUSH) 0.9 %
5-40 SYRINGE (ML) INJECTION EVERY 12 HOURS SCHEDULED
Status: DISCONTINUED | OUTPATIENT
Start: 2021-10-11 | End: 2021-10-11 | Stop reason: HOSPADM

## 2021-10-11 RX ORDER — SUCCINYLCHOLINE/SOD CL,ISO/PF 100 MG/5ML
SYRINGE (ML) INTRAVENOUS PRN
Status: DISCONTINUED | OUTPATIENT
Start: 2021-10-11 | End: 2021-10-11 | Stop reason: SDUPTHER

## 2021-10-11 RX ORDER — LIDOCAINE HYDROCHLORIDE 10 MG/ML
1 INJECTION, SOLUTION EPIDURAL; INFILTRATION; INTRACAUDAL; PERINEURAL
Status: DISCONTINUED | OUTPATIENT
Start: 2021-10-11 | End: 2021-10-11 | Stop reason: HOSPADM

## 2021-10-11 RX ORDER — SODIUM CHLORIDE 0.9 % (FLUSH) 0.9 %
5-40 SYRINGE (ML) INJECTION PRN
Status: DISCONTINUED | OUTPATIENT
Start: 2021-10-11 | End: 2021-10-11 | Stop reason: HOSPADM

## 2021-10-11 RX ORDER — ONDANSETRON 2 MG/ML
INJECTION INTRAMUSCULAR; INTRAVENOUS PRN
Status: DISCONTINUED | OUTPATIENT
Start: 2021-10-11 | End: 2021-10-11 | Stop reason: SDUPTHER

## 2021-10-11 RX ORDER — PROPOFOL 10 MG/ML
INJECTION, EMULSION INTRAVENOUS PRN
Status: DISCONTINUED | OUTPATIENT
Start: 2021-10-11 | End: 2021-10-11 | Stop reason: SDUPTHER

## 2021-10-11 RX ORDER — SODIUM CHLORIDE 9 MG/ML
INJECTION, SOLUTION INTRAVENOUS CONTINUOUS
Status: DISCONTINUED | OUTPATIENT
Start: 2021-10-11 | End: 2021-10-11 | Stop reason: HOSPADM

## 2021-10-11 RX ORDER — METOCLOPRAMIDE HYDROCHLORIDE 5 MG/ML
10 INJECTION INTRAMUSCULAR; INTRAVENOUS
Status: DISCONTINUED | OUTPATIENT
Start: 2021-10-11 | End: 2021-10-11 | Stop reason: HOSPADM

## 2021-10-11 RX ADMIN — Medication 40 MG: at 12:49

## 2021-10-11 RX ADMIN — Medication 0.2 MG: at 12:46

## 2021-10-11 RX ADMIN — PROPOFOL 60 MG: 10 INJECTION, EMULSION INTRAVENOUS at 12:49

## 2021-10-11 RX ADMIN — ONDANSETRON 4 MG: 2 INJECTION INTRAMUSCULAR; INTRAVENOUS at 12:46

## 2021-10-11 RX ADMIN — SODIUM CHLORIDE: 9 INJECTION, SOLUTION INTRAVENOUS at 10:45

## 2021-10-11 ASSESSMENT — LIFESTYLE VARIABLES: SMOKING_STATUS: 1

## 2021-10-11 NOTE — H&P
Update History & Physical    The patient's History and Physical of  was reviewed with the patient and there were no significant changes. I examined the patient and there were no significant changes from the previous History and Physical.    Vitals:    10/11/21 1030   BP: 119/71   Pulse: 64   Resp: 18   Temp: 97 °F (36.1 °C)   SpO2: 98%     Principal Problem:    Major depressive disorder, recurrent severe without psychotic features ([address]  Resolved Problems:    * No resolved hospital problems. *        Plan: The risk, benefits, expected outcome, and alternative to the recommended procedure have been discussed with the patient. Patient understands and wants to proceed with the procedure.     Electronically signed by Rowan Ridley MD

## 2021-10-11 NOTE — FLOWSHEET NOTE
Discharge instruction given. Patient verbalizes understanding with no further questions. IV out, patient being discharged.

## 2021-10-11 NOTE — ANESTHESIA POSTPROCEDURE EVALUATION
Department of Anesthesiology  Postprocedure Note    Patient: Brenda Rice  MRN: [id number]  YOB: 1960  Date of evaluation: 10/11/2021  Time:  12:55 PM     Procedure Summary     Date: 10/11/21 Room / Location: [facility] PACU    Anesthesia Start: 1246 Anesthesia Stop: 1255    Procedure: ECT W/ ANESTHESIA Diagnosis:     Scheduled Providers:  Responsible Provider: Dallas Lloyd MD    Anesthesia Type: general ASA Status: 2          Anesthesia Type: general    Mirtha Phase I: Mirtha Score: 10    Mirtha Phase II:      Last vitals: Reviewed and per EMR flowsheets.        Anesthesia Post Evaluation    Patient location during evaluation: bedside  Patient participation: complete - patient participated  Level of consciousness: awake and awake and alert  Airway patency: patent  Nausea & Vomiting: no nausea and no vomiting  Complications: no  Cardiovascular status: blood pressure returned to baseline and hemodynamically stable  Respiratory status: acceptable  Hydration status: euvolemic
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
## 2021-10-11 NOTE — OP NOTE
Department of Psychiatry  Electroconvulsive Therapy Treatment Note        10/11/2021    PURPOSE FOR ECT:  Therapeutic NUMBER: 4    DIAGNOSIS:   Principal Problem:    Major depressive disorder, recurrent severe without psychotic features ([facility] [address]  Resolved Problems:    * No resolved hospital problems. *      MEDICATIONS:    Current Facility-Administered Medications:     0.9 % sodium chloride infusion, , IntraVENous, Continuous, Alex Tanika, DO, Last Rate: 125 mL/hr at 10/11/21 1045, New Bag at 10/11/21 1045    0.9 % sodium chloride infusion, 25 mL, IntraVENous, PRN, Colon Aquas K Roxanne, DO    lidocaine PF 1 % injection 1 mL, 1 mL, IntraDERmal, Once PRN, Alex Tanika, DO    sodium chloride flush 0.9 % injection 5-40 mL, 5-40 mL, IntraVENous, 2 times per day, Alex Tanika, DO    sodium chloride flush 0.9 % injection 5-40 mL, 5-40 mL, IntraVENous, PRN, Verdene Irons Roxanne, DO    metoclopramide (REGLAN) injection 10 mg, 10 mg, IntraVENous, Once PRN, Perla Bell MD    CHANGE IN PSYCHIATRY STATUS SINCE LAST ECT:   Starting to notice improvement    INFORMED CONSENT OBTAINED : YES    PRE ECT MEDICATION ADMINISTERED:   YES    NPO STATUS: Confirmed    ELECTRODE PLACEMENT : RUL    TIME OUT :  TEAM CONFIRMS THE CORRECT PATIENT, CORRECT PROCEDURE AND CORRECT SITE.     DEVICE PARAMETERS:   Charge- 172  PW - 0.3  Freq- 45  Duration- 8  EEG-35  Motor-  30    VITALS:   Vitals:    10/11/21 1030   BP: 119/71   Pulse: 64   Resp: 18   Temp: 97 °F (36.1 °C)   SpO2: 63%         COMPLICATIONS: no      RECOMMENDATIONS FOR NEXT TREATMENT:  wed        PSYCHIATRIST:  Huey Gaucher, MD

## 2021-10-12 ENCOUNTER — ANESTHESIA EVENT (OUTPATIENT)
Dept: POSTOP/PACU | Age: 61
End: 2021-10-12
Payer: MEDICARE

## 2021-10-12 ASSESSMENT — LIFESTYLE VARIABLES: SMOKING_STATUS: 1
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
## 2021-10-13 ENCOUNTER — TELEPHONE (OUTPATIENT)
Dept: CASE MANAGEMENT | Age: 61
End: 2021-10-13

## 2021-10-13 ENCOUNTER — HOSPITAL ENCOUNTER (OUTPATIENT)
Dept: POSTOP/PACU | Age: 61
Discharge: HOME | End: 2021-10-13
Attending: PSYCHIATRY & NEUROLOGY | Admitting: PSYCHIATRY & NEUROLOGY
Payer: MEDICARE

## 2021-10-13 ENCOUNTER — ANESTHESIA (OUTPATIENT)
Dept: POSTOP/PACU | Age: 61
End: 2021-10-13
Payer: MEDICARE

## 2021-10-13 VITALS
SYSTOLIC BLOOD PRESSURE: 112 MMHG | HEART RATE: 80 BPM | RESPIRATION RATE: 16 BRPM | TEMPERATURE: 97 F | OXYGEN SATURATION: 99 % | DIASTOLIC BLOOD PRESSURE: 62 MMHG

## 2021-10-13 VITALS
RESPIRATION RATE: 16 BRPM | OXYGEN SATURATION: 100 % | SYSTOLIC BLOOD PRESSURE: 141 MMHG | DIASTOLIC BLOOD PRESSURE: 80 MMHG

## 2021-10-13 PROCEDURE — 3700000000 HC ANESTHESIA ATTENDED CARE

## 2021-10-13 PROCEDURE — 90870 ELECTROCONVULSIVE THERAPY: CPT

## 2021-10-13 PROCEDURE — 90870 ELECTROCONVULSIVE THERAPY: CPT | Performed by: PSYCHIATRY & NEUROLOGY

## 2021-10-13 PROCEDURE — 2580000003 HC RX 258: Performed by: ANESTHESIOLOGY

## 2021-10-13 PROCEDURE — 2500000003 HC RX 250 WO HCPCS: Performed by: ANESTHESIOLOGY

## 2021-10-13 PROCEDURE — 6360000002 HC RX W HCPCS: Performed by: ANESTHESIOLOGY

## 2021-10-13 RX ORDER — SUCCINYLCHOLINE/SOD CL,ISO/PF 100 MG/5ML
SYRINGE (ML) INTRAVENOUS PRN
Status: DISCONTINUED | OUTPATIENT
Start: 2021-10-13 | End: 2021-10-13 | Stop reason: SDUPTHER

## 2021-10-13 RX ORDER — SODIUM CHLORIDE, SODIUM LACTATE, POTASSIUM CHLORIDE, CALCIUM CHLORIDE 600; 310; 30; 20 MG/100ML; MG/100ML; MG/100ML; MG/100ML
INJECTION, SOLUTION INTRAVENOUS CONTINUOUS
Status: DISCONTINUED | OUTPATIENT
Start: 2021-10-13 | End: 2021-10-13 | Stop reason: HOSPADM

## 2021-10-13 RX ORDER — PROPOFOL 10 MG/ML
INJECTION, EMULSION INTRAVENOUS PRN
Status: DISCONTINUED | OUTPATIENT
Start: 2021-10-13 | End: 2021-10-13 | Stop reason: SDUPTHER

## 2021-10-13 RX ORDER — GLYCOPYRROLATE 1 MG/5 ML
SYRINGE (ML) INTRAVENOUS PRN
Status: DISCONTINUED | OUTPATIENT
Start: 2021-10-13 | End: 2021-10-13 | Stop reason: SDUPTHER

## 2021-10-13 RX ORDER — LIDOCAINE HYDROCHLORIDE 10 MG/ML
1 INJECTION, SOLUTION EPIDURAL; INFILTRATION; INTRACAUDAL; PERINEURAL
Status: DISCONTINUED | OUTPATIENT
Start: 2021-10-13 | End: 2021-10-13 | Stop reason: HOSPADM

## 2021-10-13 RX ORDER — ONDANSETRON 2 MG/ML
INJECTION INTRAMUSCULAR; INTRAVENOUS PRN
Status: DISCONTINUED | OUTPATIENT
Start: 2021-10-13 | End: 2021-10-13 | Stop reason: SDUPTHER

## 2021-10-13 RX ORDER — SODIUM CHLORIDE 0.9 % (FLUSH) 0.9 %
10 SYRINGE (ML) INJECTION EVERY 12 HOURS SCHEDULED
Status: DISCONTINUED | OUTPATIENT
Start: 2021-10-13 | End: 2021-10-13 | Stop reason: HOSPADM

## 2021-10-13 RX ORDER — SODIUM CHLORIDE 0.9 % (FLUSH) 0.9 %
10 SYRINGE (ML) INJECTION PRN
Status: DISCONTINUED | OUTPATIENT
Start: 2021-10-13 | End: 2021-10-13 | Stop reason: HOSPADM

## 2021-10-13 RX ORDER — SODIUM CHLORIDE 9 MG/ML
25 INJECTION, SOLUTION INTRAVENOUS PRN
Status: DISCONTINUED | OUTPATIENT
Start: 2021-10-13 | End: 2021-10-13 | Stop reason: HOSPADM

## 2021-10-13 RX ADMIN — Medication 0.2 MG: at 12:06

## 2021-10-13 RX ADMIN — ONDANSETRON 4 MG: 2 INJECTION INTRAMUSCULAR; INTRAVENOUS at 12:06

## 2021-10-13 RX ADMIN — Medication 30 MG: at 12:14

## 2021-10-13 RX ADMIN — PROPOFOL 90 MG: 10 INJECTION, EMULSION INTRAVENOUS at 12:14

## 2021-10-13 RX ADMIN — SODIUM CHLORIDE 1000 ML: 9 INJECTION, SOLUTION INTRAVENOUS at 11:37

## 2021-10-13 ASSESSMENT — PAIN SCALES - GENERAL: PAINLEVEL_OUTOF10: 0

## 2021-10-13 ASSESSMENT — PATIENT HEALTH QUESTIONNAIRE - PHQ9: SUM OF ALL RESPONSES TO PHQ QUESTIONS 1-9: 3

## 2021-10-13 NOTE — TELEPHONE ENCOUNTER
Physician documentation on smoking history and CT Lung Screening reviewed. All required documentation complete. Patient is a curretnt every day smoker with a 40 pack year history ( 1 ppd x 40 years) per physician documentation.

## 2021-10-13 NOTE — ANESTHESIA POSTPROCEDURE EVALUATION
Department of Anesthesiology  Postprocedure Note    Patient: Keshav De Guzman  MRN: [id number]  YOB: 1960  Date of evaluation: 10/13/2021  Time:  12:23 PM     Procedure Summary     Date: 10/13/21 Room / [place]: [facility] [place]    Anesthesia Start: 2140 Anesthesia Stop: 5612    Procedure: ECT W/ ANESTHESIA Diagnosis:     Scheduled Providers:  Responsible Provider: Nayeli Liu MD    Anesthesia Type: general ASA Status: 3          Anesthesia Type: general    Mirtha Phase I: Mirtha Score: 10    Mirtha Phase II:      Last vitals: Reviewed and per EMR flowsheets.        Anesthesia Post Evaluation    Patient location during evaluation: PACU  Patient participation: waiting for patient participation  Level of consciousness: sleepy but conscious  Airway patency: patent  Nausea & Vomiting: no nausea and no vomiting  Complications: no  Cardiovascular status: blood pressure returned to baseline and hemodynamically stable  Respiratory status: acceptable and face mask  Hydration status: euvolemic

## 2021-10-13 NOTE — BH NOTE
Pt denies SI, HI, AVH, PDW, Self Harm. Reports good sleep of 7 hours at night time. Anxiety 3/10, depression 5/10. States she has noticed an increase in motivation but continues to notice she is easily irritated.

## 2021-10-13 NOTE — H&P
Update History & Physical    The patient's History and Physical of  was reviewed with the patient and there were no significant changes. I examined the patient and there were no significant changes from the previous History and Physical.    There were no vitals filed for this visit. Active Problems:    Major depressive disorder, recurrent severe without psychotic features ([address]  Resolved Problems:    * No resolved hospital problems. *        Plan: The risk, benefits, expected outcome, and alternative to the recommended procedure have been discussed with the patient. Patient understands and wants to proceed with the procedure.     Electronically signed by Giuseppe Dill MD

## 2021-10-14 ENCOUNTER — ANESTHESIA EVENT (OUTPATIENT)
Dept: POSTOP/PACU | Age: 61
End: 2021-10-14
Payer: MEDICARE

## 2021-10-14 ASSESSMENT — LIFESTYLE VARIABLES: SMOKING_STATUS: 1

## 2021-10-15 ENCOUNTER — HOSPITAL ENCOUNTER (OUTPATIENT)
Dept: POSTOP/PACU | Age: 61
Discharge: HOME | End: 2021-10-15
Attending: PSYCHIATRY & NEUROLOGY | Admitting: PSYCHIATRY & NEUROLOGY
Payer: MEDICARE

## 2021-10-15 ENCOUNTER — ANESTHESIA (OUTPATIENT)
Dept: POSTOP/PACU | Age: 61
End: 2021-10-15
Payer: MEDICARE

## 2021-10-15 VITALS
HEART RATE: 72 BPM | BODY MASS INDEX: 23.9 KG/M2 | OXYGEN SATURATION: 98 % | DIASTOLIC BLOOD PRESSURE: 66 MMHG | RESPIRATION RATE: 14 BRPM | SYSTOLIC BLOOD PRESSURE: 134 MMHG | TEMPERATURE: 97.1 F | HEIGHT: 64 IN | WEIGHT: 140 LBS

## 2021-10-15 VITALS
DIASTOLIC BLOOD PRESSURE: 104 MMHG | RESPIRATION RATE: 21 BRPM | OXYGEN SATURATION: 100 % | SYSTOLIC BLOOD PRESSURE: 172 MMHG

## 2021-10-15 LAB — SARS-COV-2, NAAT: NOT DETECTED

## 2021-10-15 PROCEDURE — 7100000001 HC PACU RECOVERY - ADDTL 15 MIN

## 2021-10-15 PROCEDURE — 3700000000 HC ANESTHESIA ATTENDED CARE

## 2021-10-15 PROCEDURE — 2500000003 HC RX 250 WO HCPCS: Performed by: STUDENT IN AN ORGANIZED HEALTH CARE EDUCATION/TRAINING PROGRAM

## 2021-10-15 PROCEDURE — 90870 ELECTROCONVULSIVE THERAPY: CPT | Performed by: PSYCHIATRY & NEUROLOGY

## 2021-10-15 PROCEDURE — 87635 SARS-COV-2 COVID-19 AMP PRB: CPT

## 2021-10-15 PROCEDURE — 6360000002 HC RX W HCPCS: Performed by: STUDENT IN AN ORGANIZED HEALTH CARE EDUCATION/TRAINING PROGRAM

## 2021-10-15 PROCEDURE — 90870 ELECTROCONVULSIVE THERAPY: CPT

## 2021-10-15 PROCEDURE — 7100000000 HC PACU RECOVERY - FIRST 15 MIN

## 2021-10-15 PROCEDURE — 2580000003 HC RX 258: Performed by: STUDENT IN AN ORGANIZED HEALTH CARE EDUCATION/TRAINING PROGRAM

## 2021-10-15 RX ORDER — LIDOCAINE HYDROCHLORIDE 10 MG/ML
1 INJECTION, SOLUTION EPIDURAL; INFILTRATION; INTRACAUDAL; PERINEURAL
Status: CANCELLED | OUTPATIENT
Start: 2021-10-15 | End: 2021-10-15

## 2021-10-15 RX ORDER — SODIUM CHLORIDE 9 MG/ML
INJECTION, SOLUTION INTRAVENOUS CONTINUOUS
Status: CANCELLED | OUTPATIENT
Start: 2021-10-15

## 2021-10-15 RX ORDER — SODIUM CHLORIDE 9 MG/ML
INJECTION, SOLUTION INTRAVENOUS CONTINUOUS
Status: DISCONTINUED | OUTPATIENT
Start: 2021-10-15 | End: 2021-10-15 | Stop reason: HOSPADM

## 2021-10-15 RX ORDER — SODIUM CHLORIDE 0.9 % (FLUSH) 0.9 %
5-40 SYRINGE (ML) INJECTION EVERY 12 HOURS SCHEDULED
Status: CANCELLED | OUTPATIENT
Start: 2021-10-15

## 2021-10-15 RX ORDER — SUCCINYLCHOLINE/SOD CL,ISO/PF 100 MG/5ML
SYRINGE (ML) INTRAVENOUS PRN
Status: DISCONTINUED | OUTPATIENT
Start: 2021-10-15 | End: 2021-10-15 | Stop reason: SDUPTHER

## 2021-10-15 RX ORDER — PROPOFOL 10 MG/ML
INJECTION, EMULSION INTRAVENOUS PRN
Status: DISCONTINUED | OUTPATIENT
Start: 2021-10-15 | End: 2021-10-15 | Stop reason: SDUPTHER

## 2021-10-15 RX ORDER — SODIUM CHLORIDE 9 MG/ML
25 INJECTION, SOLUTION INTRAVENOUS PRN
Status: CANCELLED | OUTPATIENT
Start: 2021-10-15

## 2021-10-15 RX ORDER — GLYCOPYRROLATE 1 MG/5 ML
SYRINGE (ML) INTRAVENOUS PRN
Status: DISCONTINUED | OUTPATIENT
Start: 2021-10-15 | End: 2021-10-15 | Stop reason: SDUPTHER

## 2021-10-15 RX ORDER — SODIUM CHLORIDE 0.9 % (FLUSH) 0.9 %
5-40 SYRINGE (ML) INJECTION PRN
Status: CANCELLED | OUTPATIENT
Start: 2021-10-15

## 2021-10-15 RX ORDER — ONDANSETRON 2 MG/ML
4 INJECTION INTRAMUSCULAR; INTRAVENOUS
Status: COMPLETED | OUTPATIENT
Start: 2021-10-15 | End: 2021-10-15

## 2021-10-15 RX ADMIN — Medication 0.2 MG: at 08:08

## 2021-10-15 RX ADMIN — SODIUM CHLORIDE: 9 INJECTION, SOLUTION INTRAVENOUS at 07:16

## 2021-10-15 RX ADMIN — Medication 30 MG: at 08:13

## 2021-10-15 RX ADMIN — ONDANSETRON 4 MG: 2 INJECTION INTRAMUSCULAR; INTRAVENOUS at 08:08

## 2021-10-15 RX ADMIN — PROPOFOL 40 MG: 10 INJECTION, EMULSION INTRAVENOUS at 08:13

## 2021-10-15 ASSESSMENT — PATIENT HEALTH QUESTIONNAIRE - PHQ9: SUM OF ALL RESPONSES TO PHQ QUESTIONS 1-9: 2

## 2021-10-15 NOTE — BH NOTE
Pt denies SI, HI, aVH, PDW, self harm. Reports good night time sleep and no day time sleepiness. Anxiety 0/10, depression 3/10. Reports a period of irritability after ECT lasting about 3 hours.

## 2021-10-15 NOTE — ANESTHESIA POSTPROCEDURE EVALUATION
Department of Anesthesiology  Postprocedure Note    Patient: Tj Ragland  MRN: [id number]  YOB: 1960  Date of evaluation: 10/15/2021  Time:  8:17 AM     Procedure Summary     Date: 10/15/21 [place] [facility] [place]    Anesthesia Start: 0808 Anesthesia Stop:     Procedure: ECT W/ ANESTHESIA Diagnosis:     Scheduled Providers:  Responsible Provider: Angelita Marquez DO    Anesthesia Type: general ASA Status: 2          Anesthesia Type: General    Mirtha Phase I: Mirtha Score: 10    Mirtha Phase II:      Last vitals: Reviewed and per EMR flowsheets.        Anesthesia Post Evaluation    Patient location during evaluation: bedside  Patient participation: complete - patient participated  Level of consciousness: awake and awake and alert  Pain score: 0  Airway patency: patent  Nausea & Vomiting: no nausea and no vomiting  Complications: no  Cardiovascular status: blood pressure returned to baseline and hemodynamically stable  Respiratory status: acceptable  Hydration status: euvolemic
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
## 2021-10-15 NOTE — H&P
Update History & Physical    The patient's History and Physical of  was reviewed with the patient and there were no significant changes. I examined the patient and there were no significant changes from the previous History and Physical.    Vitals:    10/15/21 0704   BP: 134/65   Pulse: 56   Resp: 18   Temp: 97.2 °F (36.2 °C)   SpO2: 98%     Principal Problem:    Major depressive disorder, recurrent severe without psychotic features ([address]  Resolved Problems:    * No resolved hospital problems. *        Plan: The risk, benefits, expected outcome, and alternative to the recommended procedure have been discussed with the patient. Patient understands and wants to proceed with the procedure.     Electronically signed by Angela Jaquez MD

## 2021-10-20 ENCOUNTER — ANESTHESIA (OUTPATIENT)
Dept: POSTOP/PACU | Age: 61
End: 2021-10-20
Payer: MEDICARE

## 2021-10-20 ENCOUNTER — ANESTHESIA EVENT (OUTPATIENT)
Dept: POSTOP/PACU | Age: 61
End: 2021-10-20
Payer: MEDICARE

## 2021-10-20 ENCOUNTER — HOSPITAL ENCOUNTER (OUTPATIENT)
Dept: POSTOP/PACU | Age: 61
Discharge: HOME | End: 2021-10-20
Attending: PSYCHIATRY & NEUROLOGY | Admitting: PSYCHIATRY & NEUROLOGY
Payer: MEDICARE

## 2021-10-20 VITALS
SYSTOLIC BLOOD PRESSURE: 138 MMHG | WEIGHT: 140 LBS | HEART RATE: 76 BPM | DIASTOLIC BLOOD PRESSURE: 60 MMHG | RESPIRATION RATE: 18 BRPM | BODY MASS INDEX: 23.9 KG/M2 | HEIGHT: 64 IN | TEMPERATURE: 98 F | OXYGEN SATURATION: 98 %

## 2021-10-20 VITALS
RESPIRATION RATE: 20 BRPM | DIASTOLIC BLOOD PRESSURE: 67 MMHG | SYSTOLIC BLOOD PRESSURE: 157 MMHG | OXYGEN SATURATION: 100 %

## 2021-10-20 PROCEDURE — 2500000003 HC RX 250 WO HCPCS: Performed by: STUDENT IN AN ORGANIZED HEALTH CARE EDUCATION/TRAINING PROGRAM

## 2021-10-20 PROCEDURE — 6360000002 HC RX W HCPCS: Performed by: STUDENT IN AN ORGANIZED HEALTH CARE EDUCATION/TRAINING PROGRAM

## 2021-10-20 PROCEDURE — 2580000003 HC RX 258: Performed by: STUDENT IN AN ORGANIZED HEALTH CARE EDUCATION/TRAINING PROGRAM

## 2021-10-20 PROCEDURE — 3700000000 HC ANESTHESIA ATTENDED CARE

## 2021-10-20 PROCEDURE — 7100000000 HC PACU RECOVERY - FIRST 15 MIN

## 2021-10-20 PROCEDURE — 90870 ELECTROCONVULSIVE THERAPY: CPT | Performed by: PSYCHIATRY & NEUROLOGY

## 2021-10-20 PROCEDURE — 7100000001 HC PACU RECOVERY - ADDTL 15 MIN

## 2021-10-20 PROCEDURE — 90870 ELECTROCONVULSIVE THERAPY: CPT

## 2021-10-20 RX ORDER — SODIUM CHLORIDE 0.9 % (FLUSH) 0.9 %
5-40 SYRINGE (ML) INJECTION EVERY 12 HOURS SCHEDULED
Status: CANCELLED | OUTPATIENT
Start: 2021-10-20

## 2021-10-20 RX ORDER — SUCCINYLCHOLINE/SOD CL,ISO/PF 100 MG/5ML
SYRINGE (ML) INTRAVENOUS PRN
Status: DISCONTINUED | OUTPATIENT
Start: 2021-10-20 | End: 2021-10-20 | Stop reason: SDUPTHER

## 2021-10-20 RX ORDER — SODIUM CHLORIDE 9 MG/ML
25 INJECTION, SOLUTION INTRAVENOUS PRN
Status: CANCELLED | OUTPATIENT
Start: 2021-10-20

## 2021-10-20 RX ORDER — ONDANSETRON 2 MG/ML
INJECTION INTRAMUSCULAR; INTRAVENOUS PRN
Status: DISCONTINUED | OUTPATIENT
Start: 2021-10-20 | End: 2021-10-20 | Stop reason: SDUPTHER

## 2021-10-20 RX ORDER — PROPOFOL 10 MG/ML
INJECTION, EMULSION INTRAVENOUS PRN
Status: DISCONTINUED | OUTPATIENT
Start: 2021-10-20 | End: 2021-10-20 | Stop reason: SDUPTHER

## 2021-10-20 RX ORDER — SODIUM CHLORIDE 9 MG/ML
25 INJECTION, SOLUTION INTRAVENOUS PRN
Status: DISCONTINUED | OUTPATIENT
Start: 2021-10-20 | End: 2021-10-20 | Stop reason: HOSPADM

## 2021-10-20 RX ORDER — SODIUM CHLORIDE 0.9 % (FLUSH) 0.9 %
5-40 SYRINGE (ML) INJECTION PRN
Status: DISCONTINUED | OUTPATIENT
Start: 2021-10-20 | End: 2021-10-20 | Stop reason: HOSPADM

## 2021-10-20 RX ORDER — GLYCOPYRROLATE 1 MG/5 ML
SYRINGE (ML) INTRAVENOUS PRN
Status: DISCONTINUED | OUTPATIENT
Start: 2021-10-20 | End: 2021-10-20 | Stop reason: SDUPTHER

## 2021-10-20 RX ORDER — SODIUM CHLORIDE 9 MG/ML
INJECTION, SOLUTION INTRAVENOUS CONTINUOUS
Status: CANCELLED | OUTPATIENT
Start: 2021-10-20

## 2021-10-20 RX ORDER — SODIUM CHLORIDE 0.9 % (FLUSH) 0.9 %
5-40 SYRINGE (ML) INJECTION EVERY 12 HOURS SCHEDULED
Status: DISCONTINUED | OUTPATIENT
Start: 2021-10-20 | End: 2021-10-20 | Stop reason: HOSPADM

## 2021-10-20 RX ORDER — LIDOCAINE HYDROCHLORIDE 10 MG/ML
1 INJECTION, SOLUTION EPIDURAL; INFILTRATION; INTRACAUDAL; PERINEURAL
Status: DISCONTINUED | OUTPATIENT
Start: 2021-10-20 | End: 2021-10-20 | Stop reason: HOSPADM

## 2021-10-20 RX ORDER — ONDANSETRON 2 MG/ML
4 INJECTION INTRAMUSCULAR; INTRAVENOUS
Status: DISCONTINUED | OUTPATIENT
Start: 2021-10-20 | End: 2021-10-20 | Stop reason: HOSPADM

## 2021-10-20 RX ORDER — SODIUM CHLORIDE 0.9 % (FLUSH) 0.9 %
5-40 SYRINGE (ML) INJECTION PRN
Status: CANCELLED | OUTPATIENT
Start: 2021-10-20

## 2021-10-20 RX ORDER — SODIUM CHLORIDE 9 MG/ML
INJECTION, SOLUTION INTRAVENOUS CONTINUOUS
Status: DISCONTINUED | OUTPATIENT
Start: 2021-10-20 | End: 2021-10-20 | Stop reason: HOSPADM

## 2021-10-20 RX ADMIN — SODIUM CHLORIDE: 9 INJECTION, SOLUTION INTRAVENOUS at 11:31

## 2021-10-20 RX ADMIN — PROPOFOL 40 MG: 10 INJECTION, EMULSION INTRAVENOUS at 12:36

## 2021-10-20 RX ADMIN — ONDANSETRON 4 MG: 2 INJECTION INTRAMUSCULAR; INTRAVENOUS at 12:34

## 2021-10-20 RX ADMIN — Medication 0.2 MG: at 12:34

## 2021-10-20 RX ADMIN — Medication 40 MG: at 12:36

## 2021-10-20 RX ADMIN — PROPOFOL 50 MG: 10 INJECTION, EMULSION INTRAVENOUS at 12:35

## 2021-10-20 ASSESSMENT — LIFESTYLE VARIABLES: SMOKING_STATUS: 1

## 2021-10-20 ASSESSMENT — PATIENT HEALTH QUESTIONNAIRE - PHQ9: SUM OF ALL RESPONSES TO PHQ QUESTIONS 1-9: 4

## 2021-10-20 NOTE — ANESTHESIA POSTPROCEDURE EVALUATION
Department of Anesthesiology  Postprocedure Note    Patient: Esthela Roe  MRN: [id number]  YOB: 1960  Date of evaluation: 10/20/2021  Time:  12:43 PM     Procedure Summary     Date: 10/20/21 Room / Location: [facility] [place] PACU    Anesthesia Start: 1233 Anesthesia Stop:     Procedure: ECT W/ ANESTHESIA Diagnosis:     Scheduled Providers:  Responsible Provider: Alex Sagastume DO    Anesthesia Type: general ASA Status: 2          Anesthesia Type: General    Mirtha Phase I: Mirtha Score: 10    Mirtha Phase II:      Last vitals: Reviewed and per EMR flowsheets.        Anesthesia Post Evaluation    Patient location during evaluation: bedside  Patient participation: complete - patient participated  Level of consciousness: awake and awake and alert  Pain score: 0  Airway patency: patent  Nausea & Vomiting: no nausea and no vomiting  Complications: no  Cardiovascular status: blood pressure returned to baseline and hemodynamically stable  Respiratory status: acceptable  Hydration status: euvolemic

## 2021-10-20 NOTE — FLOWSHEET NOTE
12- Dr. Margarita Philip aware of pt chart being flagged with pt being at risk for suicide, per Dr. Margarita Philip they are taking care of it, no new orders received-[facility]  Electronically signed by Rae Lang RN on 10/20/2021 at 12:48 PM

## 2021-10-20 NOTE — H&P
Update History & Physical    The patient's History and Physical of  was reviewed with the patient and there were no significant changes. I examined the patient and there were no significant changes from the previous History and Physical.    Vitals:    10/20/21 1121   BP: (!) 144/74   Pulse: 59   Resp: 16   Temp: 97.7 °F (36.5 °C)   SpO2: 100%     Principal Problem:    Major depressive disorder, recurrent severe without psychotic features ([address]  Resolved Problems:    * No resolved hospital problems. *        Plan: The risk, benefits, expected outcome, and alternative to the recommended procedure have been discussed with the patient. Patient understands and wants to proceed with the procedure.     Electronically signed by Rashad Drake MD

## 2021-10-20 NOTE — OP NOTE
Department of Psychiatry  Electroconvulsive Therapy Treatment Note        10/20/2021    PURPOSE FOR ECT:  Therapeutic NUMBER: 7    DIAGNOSIS:   Principal Problem:    Major depressive disorder, recurrent severe without psychotic features ([facility] [address]  Resolved Problems:    * No resolved hospital problems. *      MEDICATIONS:    Current Facility-Administered Medications:     0.9 % sodium chloride infusion, , IntraVENous, Continuous, Alex Tanika, DO, Last Rate: 125 mL/hr at 10/20/21 1131, New Bag at 10/20/21 1131    0.9 % sodium chloride infusion, 25 mL, IntraVENous, PRN, Drenda Matt K Roxanne, DO    lidocaine PF 1 % injection 1 mL, 1 mL, IntraDERmal, Once PRN, Alex Tanika, DO    sodium chloride flush 0.9 % injection 5-40 mL, 5-40 mL, IntraVENous, 2 times per day, Alex Tanika, DO    sodium chloride flush 0.9 % injection 5-40 mL, 5-40 mL, IntraVENous, PRN, Drenda Matt K Roxanne, DO    ondansetron [facility]) injection 4 mg, 4 mg, IntraVENous, Once PRN, Alex Tanika, DO    Facility-Administered Medications Ordered in Other Encounters:     ondansetron (ZOFRAN) injection, , IntraVENous, PRN, Marthann Fleeting Roxanne, DO, 4 mg at 10/20/21 1234    glycopyrrolate (ROBINUL) injection, , IntraVENous, PRN, Marthann Fleeting Roxanne, DO, 0.2 mg at 10/20/21 1234    propofol injection, , IntraVENous, PRN, Jose G K Roxanne, DO, 50 mg at 10/20/21 1235    CHANGE IN PSYCHIATRY STATUS SINCE LAST ECT:   Pt report her depression not getting any better  Passive SI  Miller for safety    INFORMED CONSENT OBTAINED : YES    PRE ECT MEDICATION ADMINISTERED:   YES    NPO STATUS: Confirmed    ELECTRODE PLACEMENT : RUL    TIME OUT :  TEAM CONFIRMS THE CORRECT PATIENT, CORRECT PROCEDURE AND CORRECT SITE.     DEVICE PARAMETERS:   Charge- 172  PW - 0.3  Freq- 45  Duration- 8  EEG-27  Motor-  22    VITALS:   Vitals:    10/20/21 1121   BP: (!) 144/74   Pulse: 59   Resp: 16   Temp: 97.7 °F (36.5 °C)   SpO2: 427%         COMPLICATIONS: no      RECOMMENDATIONS FOR NEXT TREATMENT:  fri        PSYCHIATRIST:  Angela Jaquez MD

## 2021-10-20 NOTE — BH NOTE
Pt reports anxiety 6/10, depression 8/10. States she is sleeping well and denies self harm, HI, AVH. Admits to PDW and SI, with plan or plans and thoughts on how she would execute this plan. States her thoughts oscillate from SI, not wanting to go through the pain of depression anymore to wanting to get better and \"give ECT a chance. Pt feels she is not any better with ECT. She has an appt with Dr. Emperatriz Genao tomorrow. Pt reports taking her medications as prescribed. Discussed with patient the benefits of talk therapy or grief counseling as well as again discussing what to expect with ECT. Pt tearful but engaged. Dr. Christopher Cates made aware.
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
## 2021-10-21 ENCOUNTER — VIRTUAL VISIT (OUTPATIENT)
Dept: BEHAVIORAL/MENTAL HEALTH CLINIC | Age: 61
End: 2021-10-21
Payer: MEDICARE

## 2021-10-21 DIAGNOSIS — F33.2 MAJOR DEPRESSIVE DISORDER, RECURRENT SEVERE WITHOUT PSYCHOTIC FEATURES (HCC): Primary | ICD-10-CM

## 2021-10-21 PROCEDURE — 99214 OFFICE O/P EST MOD 30 MIN: CPT | Performed by: PSYCHIATRY & NEUROLOGY
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
## 2021-10-22 ENCOUNTER — ANESTHESIA (OUTPATIENT)
Dept: POSTOP/PACU | Age: 61
End: 2021-10-22
Payer: MEDICARE

## 2021-10-22 ENCOUNTER — HOSPITAL ENCOUNTER (OUTPATIENT)
Dept: POSTOP/PACU | Age: 61
Discharge: HOME | End: 2021-10-22
Attending: PSYCHIATRY & NEUROLOGY | Admitting: PSYCHIATRY & NEUROLOGY
Payer: MEDICARE

## 2021-10-22 ENCOUNTER — ANESTHESIA EVENT (OUTPATIENT)
Dept: POSTOP/PACU | Age: 61
End: 2021-10-22
Payer: MEDICARE

## 2021-10-22 VITALS
HEIGHT: 63 IN | WEIGHT: 140 LBS | RESPIRATION RATE: 16 BRPM | OXYGEN SATURATION: 98 % | TEMPERATURE: 96.3 F | DIASTOLIC BLOOD PRESSURE: 79 MMHG | HEART RATE: 67 BPM | BODY MASS INDEX: 24.8 KG/M2 | SYSTOLIC BLOOD PRESSURE: 131 MMHG

## 2021-10-22 VITALS
RESPIRATION RATE: 17 BRPM | DIASTOLIC BLOOD PRESSURE: 103 MMHG | SYSTOLIC BLOOD PRESSURE: 165 MMHG | OXYGEN SATURATION: 100 %

## 2021-10-22 PROCEDURE — 6360000002 HC RX W HCPCS: Performed by: ANESTHESIOLOGY

## 2021-10-22 PROCEDURE — 90870 ELECTROCONVULSIVE THERAPY: CPT

## 2021-10-22 PROCEDURE — 2500000003 HC RX 250 WO HCPCS: Performed by: ANESTHESIOLOGY

## 2021-10-22 PROCEDURE — 3700000000 HC ANESTHESIA ATTENDED CARE

## 2021-10-22 PROCEDURE — 90870 ELECTROCONVULSIVE THERAPY: CPT | Performed by: PSYCHIATRY & NEUROLOGY

## 2021-10-22 PROCEDURE — 7100000000 HC PACU RECOVERY - FIRST 15 MIN

## 2021-10-22 PROCEDURE — 7100000001 HC PACU RECOVERY - ADDTL 15 MIN

## 2021-10-22 PROCEDURE — 2580000003 HC RX 258: Performed by: STUDENT IN AN ORGANIZED HEALTH CARE EDUCATION/TRAINING PROGRAM

## 2021-10-22 RX ORDER — SUCCINYLCHOLINE/SOD CL,ISO/PF 100 MG/5ML
SYRINGE (ML) INTRAVENOUS PRN
Status: DISCONTINUED | OUTPATIENT
Start: 2021-10-22 | End: 2021-10-22 | Stop reason: SDUPTHER

## 2021-10-22 RX ORDER — ONDANSETRON 2 MG/ML
INJECTION INTRAMUSCULAR; INTRAVENOUS PRN
Status: DISCONTINUED | OUTPATIENT
Start: 2021-10-22 | End: 2021-10-22 | Stop reason: SDUPTHER

## 2021-10-22 RX ORDER — SODIUM CHLORIDE 0.9 % (FLUSH) 0.9 %
5-40 SYRINGE (ML) INJECTION PRN
Status: DISCONTINUED | OUTPATIENT
Start: 2021-10-22 | End: 2021-10-22 | Stop reason: HOSPADM

## 2021-10-22 RX ORDER — SODIUM CHLORIDE 9 MG/ML
INJECTION, SOLUTION INTRAVENOUS CONTINUOUS
Status: DISCONTINUED | OUTPATIENT
Start: 2021-10-22 | End: 2021-10-22 | Stop reason: HOSPADM

## 2021-10-22 RX ORDER — SODIUM CHLORIDE 9 MG/ML
25 INJECTION, SOLUTION INTRAVENOUS PRN
Status: DISCONTINUED | OUTPATIENT
Start: 2021-10-22 | End: 2021-10-22 | Stop reason: HOSPADM

## 2021-10-22 RX ORDER — SODIUM CHLORIDE 0.9 % (FLUSH) 0.9 %
5-40 SYRINGE (ML) INJECTION EVERY 12 HOURS SCHEDULED
Status: DISCONTINUED | OUTPATIENT
Start: 2021-10-22 | End: 2021-10-22 | Stop reason: HOSPADM

## 2021-10-22 RX ORDER — GLYCOPYRROLATE 1 MG/5 ML
SYRINGE (ML) INTRAVENOUS PRN
Status: DISCONTINUED | OUTPATIENT
Start: 2021-10-22 | End: 2021-10-22 | Stop reason: SDUPTHER

## 2021-10-22 RX ORDER — PROPOFOL 10 MG/ML
INJECTION, EMULSION INTRAVENOUS PRN
Status: DISCONTINUED | OUTPATIENT
Start: 2021-10-22 | End: 2021-10-22 | Stop reason: SDUPTHER

## 2021-10-22 RX ADMIN — Medication 0.2 MG: at 09:54

## 2021-10-22 RX ADMIN — Medication 40 MG: at 09:57

## 2021-10-22 RX ADMIN — SODIUM CHLORIDE: 9 INJECTION, SOLUTION INTRAVENOUS at 09:54

## 2021-10-22 RX ADMIN — PROPOFOL 70 MG: 10 INJECTION, EMULSION INTRAVENOUS at 09:57

## 2021-10-22 RX ADMIN — ONDANSETRON 4 MG: 2 INJECTION INTRAMUSCULAR; INTRAVENOUS at 09:54

## 2021-10-22 ASSESSMENT — PATIENT HEALTH QUESTIONNAIRE - PHQ9: SUM OF ALL RESPONSES TO PHQ QUESTIONS 1-9: 6

## 2021-10-22 ASSESSMENT — PAIN - FUNCTIONAL ASSESSMENT: PAIN_FUNCTIONAL_ASSESSMENT: 0-10

## 2021-10-22 ASSESSMENT — LIFESTYLE VARIABLES: SMOKING_STATUS: 1
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
## 2021-10-22 NOTE — H&P
Update History & Physical    The patient's History and Physical of  was reviewed with the patient and there were no significant changes. I examined the patient and there were no significant changes from the previous History and Physical.    Vitals:    10/22/21 0929   BP: 124/75   Pulse: 64   Resp: 16   Temp: 96.3 °F (35.7 °C)   SpO2: 97%     Principal Problem:    Severe episode of recurrent major depressive disorder, without psychotic features ([address]  Resolved Problems:    * No resolved hospital problems. *        Plan: The risk, benefits, expected outcome, and alternative to the recommended procedure have been discussed with the patient. Patient understands and wants to proceed with the procedure.     Electronically signed by Alexander Blackman MD

## 2021-10-22 NOTE — ANESTHESIA POSTPROCEDURE EVALUATION
Department of Anesthesiology  Postprocedure Note    Patient: Avelina Rater  MRN: [id number]  YOB: 1960  Date of evaluation: 10/22/2021  Time:  10:01 AM     Procedure Summary     Date: 10/22/21 Room / Location: [facility] PACU    Anesthesia Start: 2398 Anesthesia Stop: 1001    Procedure: ECT W/ ANESTHESIA Diagnosis:     Scheduled Providers:  Responsible Provider: Iván Garcia MD    Anesthesia Type: general ASA Status: 2          Anesthesia Type: general    Mirtha Phase I: Mirtha Score: 10    Mirtha Phase II:      Last vitals: Reviewed and per EMR flowsheets.        Anesthesia Post Evaluation    Patient location during evaluation: bedside  Patient participation: complete - patient participated  Level of consciousness: awake and awake and alert  Airway patency: patent  Nausea & Vomiting: no nausea and no vomiting  Complications: no  Cardiovascular status: blood pressure returned to baseline and hemodynamically stable  Respiratory status: acceptable  Hydration status: euvolemic

## 2021-10-22 NOTE — OP NOTE
Department of Psychiatry  Electroconvulsive Therapy Treatment Note        10/22/2021    PURPOSE FOR ECT:  Therapeutic NUMBER: 8    DIAGNOSIS:   Principal Problem:    Severe episode of recurrent major depressive disorder, without psychotic features ([facility]  Resolved Problems:    * No resolved hospital problems. *      MEDICATIONS:    Current Facility-Administered Medications:     0.9 % sodium chloride infusion, , IntraVENous, Continuous, Jose G K Roxanne, DO    0.9 % sodium chloride infusion, 25 mL, IntraVENous, PRN, Aquiles Stagers Roxanne, DO    sodium chloride flush 0.9 % injection 5-40 mL, 5-40 mL, IntraVENous, 2 times per day, Alex Gepp, DO    sodium chloride flush 0.9 % injection 5-40 mL, 5-40 mL, IntraVENous, PRN, Aquiles Stagers Roxanne, DO    Facility-Administered Medications Ordered in Other Encounters:     ondansetron (ZOFRAN) injection, , IntraVENous, PRN, Jonny Oliveira MD, 4 mg at 10/22/21 0422    glycopyrrolate (ROBINUL) injection, , IntraVENous, PRN, Jonny Oliveira MD, 0.2 mg at 10/22/21 0954    CHANGE IN PSYCHIATRY STATUS SINCE LAST ECT:   Pt report her depression not getting any better  Passive SI  Miller for safety  Spoke with Dr Kishan Boss : YES    PRE ECT MEDICATION ADMINISTERED:   YES    NPO STATUS: Confirmed    ELECTRODE PLACEMENT : RUL    TIME OUT :  TEAM CONFIRMS THE CORRECT PATIENT, CORRECT PROCEDURE AND CORRECT SITE.     DEVICE PARAMETERS:   Charge- 192  PW - 0.3  Freq- 50  Duration- 8  EEG-20  Motor-  minimal motor seizure    VITALS:   Vitals:    10/22/21 0929   BP: 124/75   Pulse: 64   Resp: 16   Temp: 96.3 °F (35.7 °C)   SpO2: 91%         COMPLICATIONS: no      RECOMMENDATIONS FOR NEXT TREATMENT:  mon        PSYCHIATRIST:  Donovan Back MD

## 2021-10-25 ENCOUNTER — ANESTHESIA EVENT (OUTPATIENT)
Dept: POSTOP/PACU | Age: 61
End: 2021-10-25

## 2021-10-25 ENCOUNTER — ANESTHESIA (OUTPATIENT)
Dept: POSTOP/PACU | Age: 61
End: 2021-10-25

## 2021-10-25 ENCOUNTER — HOSPITAL ENCOUNTER (OUTPATIENT)
Dept: POSTOP/PACU | Age: 61
Discharge: HOME | End: 2021-10-25
Payer: MEDICARE

## 2021-10-25 VITALS
WEIGHT: 140 LBS | RESPIRATION RATE: 16 BRPM | TEMPERATURE: 98 F | SYSTOLIC BLOOD PRESSURE: 133 MMHG | HEART RATE: 70 BPM | DIASTOLIC BLOOD PRESSURE: 84 MMHG | HEIGHT: 64 IN | OXYGEN SATURATION: 97 % | BODY MASS INDEX: 23.9 KG/M2

## 2021-10-25 VITALS
SYSTOLIC BLOOD PRESSURE: 145 MMHG | RESPIRATION RATE: 13 BRPM | DIASTOLIC BLOOD PRESSURE: 84 MMHG | OXYGEN SATURATION: 100 %

## 2021-10-25 PROCEDURE — 90870 ELECTROCONVULSIVE THERAPY: CPT

## 2021-10-25 PROCEDURE — 7100000001 HC PACU RECOVERY - ADDTL 15 MIN

## 2021-10-25 PROCEDURE — 7100000000 HC PACU RECOVERY - FIRST 15 MIN

## 2021-10-25 PROCEDURE — 6360000002 HC RX W HCPCS: Performed by: STUDENT IN AN ORGANIZED HEALTH CARE EDUCATION/TRAINING PROGRAM

## 2021-10-25 PROCEDURE — 2580000003 HC RX 258: Performed by: PSYCHIATRY & NEUROLOGY

## 2021-10-25 PROCEDURE — 2580000003 HC RX 258: Performed by: STUDENT IN AN ORGANIZED HEALTH CARE EDUCATION/TRAINING PROGRAM

## 2021-10-25 PROCEDURE — 3700000000 HC ANESTHESIA ATTENDED CARE

## 2021-10-25 PROCEDURE — 90870 ELECTROCONVULSIVE THERAPY: CPT | Performed by: PSYCHIATRY & NEUROLOGY

## 2021-10-25 PROCEDURE — 2500000003 HC RX 250 WO HCPCS: Performed by: STUDENT IN AN ORGANIZED HEALTH CARE EDUCATION/TRAINING PROGRAM

## 2021-10-25 RX ORDER — ONDANSETRON 2 MG/ML
4 INJECTION INTRAMUSCULAR; INTRAVENOUS
Status: COMPLETED | OUTPATIENT
Start: 2021-10-25 | End: 2021-10-25

## 2021-10-25 RX ORDER — PROPOFOL 10 MG/ML
INJECTION, EMULSION INTRAVENOUS PRN
Status: DISCONTINUED | OUTPATIENT
Start: 2021-10-25 | End: 2021-10-25 | Stop reason: SDUPTHER

## 2021-10-25 RX ORDER — SODIUM CHLORIDE 0.9 % (FLUSH) 0.9 %
5-40 SYRINGE (ML) INJECTION PRN
Status: DISCONTINUED | OUTPATIENT
Start: 2021-10-25 | End: 2021-10-26 | Stop reason: HOSPADM

## 2021-10-25 RX ORDER — SODIUM CHLORIDE 9 MG/ML
INJECTION, SOLUTION INTRAVENOUS CONTINUOUS
Status: DISCONTINUED | OUTPATIENT
Start: 2021-10-25 | End: 2021-10-25

## 2021-10-25 RX ORDER — SODIUM CHLORIDE 9 MG/ML
INJECTION, SOLUTION INTRAVENOUS CONTINUOUS PRN
Status: DISCONTINUED | OUTPATIENT
Start: 2021-10-25 | End: 2021-10-25 | Stop reason: SDUPTHER

## 2021-10-25 RX ORDER — SUCCINYLCHOLINE/SOD CL,ISO/PF 100 MG/5ML
SYRINGE (ML) INTRAVENOUS PRN
Status: DISCONTINUED | OUTPATIENT
Start: 2021-10-25 | End: 2021-10-25 | Stop reason: SDUPTHER

## 2021-10-25 RX ORDER — SODIUM CHLORIDE 0.9 % (FLUSH) 0.9 %
5-40 SYRINGE (ML) INJECTION EVERY 12 HOURS SCHEDULED
Status: DISCONTINUED | OUTPATIENT
Start: 2021-10-25 | End: 2021-10-26 | Stop reason: HOSPADM

## 2021-10-25 RX ORDER — GLYCOPYRROLATE 1 MG/5 ML
SYRINGE (ML) INTRAVENOUS PRN
Status: DISCONTINUED | OUTPATIENT
Start: 2021-10-25 | End: 2021-10-25 | Stop reason: SDUPTHER

## 2021-10-25 RX ORDER — SODIUM CHLORIDE 9 MG/ML
25 INJECTION, SOLUTION INTRAVENOUS PRN
Status: DISCONTINUED | OUTPATIENT
Start: 2021-10-25 | End: 2021-10-26 | Stop reason: HOSPADM

## 2021-10-25 RX ORDER — LIDOCAINE HYDROCHLORIDE 10 MG/ML
1 INJECTION, SOLUTION EPIDURAL; INFILTRATION; INTRACAUDAL; PERINEURAL
Status: ACTIVE | OUTPATIENT
Start: 2021-10-25 | End: 2021-10-25

## 2021-10-25 RX ADMIN — Medication 0.2 MG: at 12:18

## 2021-10-25 RX ADMIN — PROPOFOL 50 MG: 10 INJECTION, EMULSION INTRAVENOUS at 12:20

## 2021-10-25 RX ADMIN — Medication 40 MG: at 12:19

## 2021-10-25 RX ADMIN — SODIUM CHLORIDE: 9 INJECTION, SOLUTION INTRAVENOUS at 11:51

## 2021-10-25 RX ADMIN — ONDANSETRON 4 MG: 2 INJECTION INTRAMUSCULAR; INTRAVENOUS at 12:18

## 2021-10-25 RX ADMIN — PROPOFOL 50 MG: 10 INJECTION, EMULSION INTRAVENOUS at 12:19

## 2021-10-25 RX ADMIN — SODIUM CHLORIDE 100 ML/HR: 9 INJECTION, SOLUTION INTRAVENOUS at 11:30

## 2021-10-25 ASSESSMENT — PAIN - FUNCTIONAL ASSESSMENT: PAIN_FUNCTIONAL_ASSESSMENT: 0-10

## 2021-10-25 ASSESSMENT — LIFESTYLE VARIABLES: SMOKING_STATUS: 1

## 2021-10-25 NOTE — OP NOTE
Department of Psychiatry  Electroconvulsive Therapy Treatment Note        10/25/2021    PURPOSE FOR ECT:  Therapeutic NUMBER: 9    DIAGNOSIS:   Principal Problem:    Severe episode of recurrent major depressive disorder, without psychotic features ([facility]  Resolved Problems:    * No resolved hospital problems. *      MEDICATIONS:    Current Outpatient Medications:     HYDROcodone-acetaminophen (NORCO) 7.5-325 MG per tablet, Take 1 tablet by mouth every 8 hours as needed for Pain for up to 30 days. , Disp: 90 tablet, Rfl: 0    atorvastatin (LIPITOR) 10 MG tablet, Take 1 tablet by mouth daily, Disp: 90 tablet, Rfl: 1    carvedilol (COREG) 12.5 MG tablet, Take 1 tablet by mouth 2 times daily, Disp: 60 tablet, Rfl: 5    amitriptyline (ELAVIL) 75 MG tablet, Take 1 tablet by mouth nightly, Disp: 30 tablet, Rfl: 3    lithium 150 MG capsule, Take 1 capsule by mouth nightly, Disp: 30 capsule, Rfl: 3    topiramate (TOPAMAX) 100 MG tablet, Take 1 tablet by mouth 2 times daily, Disp: 180 tablet, Rfl: 0    Multiple Vitamin (MULTI-VITAMIN DAILY PO), Take by mouth, Disp: , Rfl:     Wheat Dextrin (BENEFIBER) POWD, Take 4 g by mouth 3 times daily (with meals), Disp: , Rfl:     Current Facility-Administered Medications:     sodium chloride flush 0.9 % injection 5-40 mL, 5-40 mL, IntraVENous, 2 times per day, Alex Sagastume, DO    sodium chloride flush 0.9 % injection 5-40 mL, 5-40 mL, IntraVENous, PRN, Mabelene Sever K Niemiec, DO    0.9 % sodium chloride infusion, 25 mL, IntraVENous, PRN, Colte Sever K Niemiec, DO    lidocaine PF 1 % injection 1 mL, 1 mL, IntraDERmal, Once PRN, Alex Sagastume, DO    Facility-Administered Medications Ordered in Other Encounters:     0.9 % sodium chloride infusion, , IntraVENous, Continuous PRN, Noemi Oliveira, DO, New Bag at 10/25/21 1151    glycopyrrolate (ROBINUL) injection, , IntraVENous, PRN, Noemi Gutjohn Oliveira, DO, 0.2 mg at 10/25/21 1218    CHANGE IN PSYCHIATRY STATUS SINCE LAST ECT:   Feeling better  Less depressed  Not suicidal    INFORMED CONSENT OBTAINED : YES    PRE ECT MEDICATION ADMINISTERED:   YES    NPO STATUS: Confirmed    ELECTRODE PLACEMENT : RUL    TIME OUT :  TEAM CONFIRMS THE CORRECT PATIENT, CORRECT PROCEDURE AND CORRECT SITE.     DEVICE PARAMETERS:   Charge- 230  PW - 0.3  Freq- 60  Duration- 8  EEG-24  Motor-  20    VITALS:   Vitals:    10/25/21 1041   Pulse: 69   Resp: 16   Temp: 97.1 °F (36.2 °C)   SpO2: 29%         COMPLICATIONS: no      RECOMMENDATIONS FOR NEXT TREATMENT:          PSYCHIATRIST:  Rema Oliver MD

## 2021-10-25 NOTE — H&P
Update History & Physical    The patient's History and Physical of  was reviewed with the patient and there were no significant changes. I examined the patient and there were no significant changes from the previous History and Physical.    Vitals:    10/25/21 1041   Pulse: 69   Resp: 16   Temp: 97.1 °F (36.2 °C)   SpO2: 99%     Principal Problem:    Severe episode of recurrent major depressive disorder, without psychotic features ([address]  Resolved Problems:    * No resolved hospital problems. *        Plan: The risk, benefits, expected outcome, and alternative to the recommended procedure have been discussed with the patient. Patient understands and wants to proceed with the procedure.     Electronically signed by Virgilio Ramires MD

## 2021-10-25 NOTE — ANESTHESIA POSTPROCEDURE EVALUATION
Department of Anesthesiology  Postprocedure Note    Patient: Lynda Friend  MRN: [id number]  YOB: 1960  Date of evaluation: 10/25/2021  Time:  12:24 PM     Procedure Summary     Date: 10/25/21 Room / Location: [facility] [place]    Anesthesia Start: 6348 Anesthesia Stop:     Procedure: ECT W/ ANESTHESIA Diagnosis:     Scheduled Providers:  Responsible Provider: Maine Anderson DO    Anesthesia Type: general ASA Status: 2          Anesthesia Type: General  Mirtha Phase I:   10    Mirtha Phase II:      Last vitals: Reviewed and per EMR flowsheets.        Anesthesia Post Evaluation    Patient location during evaluation: bedside  Patient participation: complete - patient participated  Level of consciousness: awake and awake and alert  Pain score: 0  Airway patency: patent  Nausea & Vomiting: no nausea and no vomiting  Complications: no  Cardiovascular status: blood pressure returned to baseline and hemodynamically stable  Respiratory status: acceptable  Hydration status: euvolemic
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
## 2021-11-11 ENCOUNTER — VIRTUAL VISIT (OUTPATIENT)
Dept: BEHAVIORAL/MENTAL HEALTH CLINIC | Age: 61
End: 2021-11-11
Payer: MEDICARE

## 2021-11-11 DIAGNOSIS — F33.2 SEVERE EPISODE OF RECURRENT MAJOR DEPRESSIVE DISORDER, WITHOUT PSYCHOTIC FEATURES (HCC): Primary | ICD-10-CM

## 2021-11-11 DIAGNOSIS — F41.1 GAD (GENERALIZED ANXIETY DISORDER): ICD-10-CM

## 2021-11-11 PROCEDURE — 99215 OFFICE O/P EST HI 40 MIN: CPT | Performed by: PSYCHIATRY & NEUROLOGY

## 2021-11-11 RX ORDER — AMITRIPTYLINE HYDROCHLORIDE 150 MG/1
150 TABLET, FILM COATED ORAL NIGHTLY
Qty: 30 TABLET | Refills: 3 | Status: SHIPPED | OUTPATIENT
Start: 2021-11-11 | End: 2022-01-13 | Stop reason: SDUPTHER

## 2021-11-11 RX ORDER — LITHIUM CARBONATE 150 MG/1
150 CAPSULE ORAL NIGHTLY
Qty: 30 CAPSULE | Refills: 3 | Status: SHIPPED | OUTPATIENT
Start: 2021-11-11 | End: 2022-01-13 | Stop reason: SDUPTHER
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
## 2021-11-19 ENCOUNTER — HOSPITAL ENCOUNTER (OUTPATIENT)
Dept: CT IMAGING | Age: 61
Discharge: HOME | End: 2021-11-21
Payer: MEDICARE

## 2021-11-19 DIAGNOSIS — Z87.891 PERSONAL HISTORY OF TOBACCO USE: ICD-10-CM

## 2021-11-19 PROCEDURE — 71271 CT THORAX LUNG CANCER SCR C-: CPT

## 2021-12-02 ENCOUNTER — PATIENT MESSAGE (OUTPATIENT)
Dept: FAMILY MEDICINE CLINIC | Age: 61
End: 2021-12-02

## 2021-12-02 DIAGNOSIS — M54.17 LUMBOSACRAL RADICULOPATHY DUE TO TRAUMA: ICD-10-CM

## 2021-12-02 DIAGNOSIS — G89.4 CHRONIC PAIN DISORDER: ICD-10-CM

## 2021-12-02 RX ORDER — HYDROCODONE BITARTRATE AND ACETAMINOPHEN 7.5; 325 MG/1; MG/1
1 TABLET ORAL EVERY 8 HOURS PRN
Qty: 90 TABLET | Refills: 0 | Status: SHIPPED | OUTPATIENT
Start: 2021-12-02 | End: 2022-01-17 | Stop reason: SDUPTHER

## 2021-12-02 NOTE — TELEPHONE ENCOUNTER
From: Mirta Ferrell  To: Lynn Yadav  Sent: 12/2/2021 10:50 AM EST  Subject: Prescription refill    [facility]. Hope all is well with you. Can you please call in a refill of my [place] to [facility] in [facility] on [address]. I hope you have a nice holiday season as I am not scheduled to see you until January for a follow-up. I went and had the lung scan and the results were negative for any issues thankfully. I appreciate all you do for me. Take care and I will see you in 2022.  Montana Pruitt

## 2021-12-09 ENCOUNTER — VIRTUAL VISIT (OUTPATIENT)
Dept: BEHAVIORAL/MENTAL HEALTH CLINIC | Age: 61
End: 2021-12-09
Payer: MEDICARE

## 2021-12-09 DIAGNOSIS — F41.1 GAD (GENERALIZED ANXIETY DISORDER): ICD-10-CM

## 2021-12-09 DIAGNOSIS — F33.1 MAJOR DEPRESSIVE DISORDER, RECURRENT EPISODE, MODERATE (HCC): Primary | ICD-10-CM

## 2021-12-09 PROCEDURE — 99214 OFFICE O/P EST MOD 30 MIN: CPT | Performed by: PSYCHIATRY & NEUROLOGY
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
## 2022-01-03 ENCOUNTER — OFFICE VISIT (OUTPATIENT)
Dept: FAMILY MEDICINE CLINIC | Age: 62
End: 2022-01-03
Payer: MEDICARE

## 2022-01-03 VITALS
HEART RATE: 90 BPM | WEIGHT: 147 LBS | DIASTOLIC BLOOD PRESSURE: 86 MMHG | SYSTOLIC BLOOD PRESSURE: 128 MMHG | HEIGHT: 64 IN | BODY MASS INDEX: 25.1 KG/M2 | OXYGEN SATURATION: 99 %

## 2022-01-03 DIAGNOSIS — F33.2 MAJOR DEPRESSIVE DISORDER, RECURRENT SEVERE WITHOUT PSYCHOTIC FEATURES (HCC): ICD-10-CM

## 2022-01-03 DIAGNOSIS — E78.5 HYPERLIPIDEMIA, UNSPECIFIED HYPERLIPIDEMIA TYPE: ICD-10-CM

## 2022-01-03 DIAGNOSIS — N95.2 VAGINAL ATROPHY: Primary | ICD-10-CM

## 2022-01-03 DIAGNOSIS — G89.4 CHRONIC PAIN DISORDER: ICD-10-CM

## 2022-01-03 PROCEDURE — 99214 OFFICE O/P EST MOD 30 MIN: CPT | Performed by: NURSE PRACTITIONER

## 2022-01-03 RX ORDER — ESTRADIOL 10 UG/1
INSERT VAGINAL
Qty: 18 TABLET | Refills: 5 | Status: SHIPPED | OUTPATIENT
Start: 2022-01-03

## 2022-01-03 ASSESSMENT — ENCOUNTER SYMPTOMS
SHORTNESS OF BREATH: 0
COUGH: 0
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
## 2022-01-12 ENCOUNTER — OFFICE VISIT (OUTPATIENT)
Dept: CARDIOLOGY CLINIC | Age: 62
End: 2022-01-12
Payer: MEDICARE

## 2022-01-12 VITALS
OXYGEN SATURATION: 100 % | SYSTOLIC BLOOD PRESSURE: 126 MMHG | WEIGHT: 149 LBS | DIASTOLIC BLOOD PRESSURE: 78 MMHG | HEIGHT: 64 IN | BODY MASS INDEX: 25.44 KG/M2 | HEART RATE: 80 BPM | RESPIRATION RATE: 16 BRPM

## 2022-01-12 DIAGNOSIS — I10 ESSENTIAL HYPERTENSION: ICD-10-CM

## 2022-01-12 DIAGNOSIS — F17.200 SMOKER: ICD-10-CM

## 2022-01-12 DIAGNOSIS — R06.02 SHORTNESS OF BREATH: ICD-10-CM

## 2022-01-12 DIAGNOSIS — E78.9 LIPID DISORDER: ICD-10-CM

## 2022-01-12 DIAGNOSIS — R00.0 TACHYCARDIA: Primary | ICD-10-CM

## 2022-01-12 PROCEDURE — 99214 OFFICE O/P EST MOD 30 MIN: CPT | Performed by: INTERNAL MEDICINE

## 2022-01-12 ASSESSMENT — ENCOUNTER SYMPTOMS
CHEST TIGHTNESS: 1
COUGH: 0
GASTROINTESTINAL NEGATIVE: 1
STRIDOR: 0
WHEEZING: 0
BLOOD IN STOOL: 0
EYES NEGATIVE: 1
NAUSEA: 0
SHORTNESS OF BREATH: 1
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
## 2022-01-13 ENCOUNTER — VIRTUAL VISIT (OUTPATIENT)
Dept: BEHAVIORAL/MENTAL HEALTH CLINIC | Age: 62
End: 2022-01-13
Payer: MEDICARE

## 2022-01-13 DIAGNOSIS — F33.40 RECURRENT MAJOR DEPRESSIVE DISORDER, IN REMISSION (HCC): Primary | ICD-10-CM

## 2022-01-13 DIAGNOSIS — F41.1 GAD (GENERALIZED ANXIETY DISORDER): ICD-10-CM

## 2022-01-13 PROCEDURE — 99214 OFFICE O/P EST MOD 30 MIN: CPT | Performed by: PSYCHIATRY & NEUROLOGY

## 2022-01-13 RX ORDER — LITHIUM CARBONATE 150 MG/1
150 CAPSULE ORAL NIGHTLY
Qty: 90 CAPSULE | Refills: 1 | Status: SHIPPED | OUTPATIENT
Start: 2022-01-13 | End: 2022-02-24 | Stop reason: SDUPTHER

## 2022-01-13 RX ORDER — AMITRIPTYLINE HYDROCHLORIDE 150 MG/1
150 TABLET, FILM COATED ORAL NIGHTLY
Qty: 90 TABLET | Refills: 1 | Status: SHIPPED | OUTPATIENT
Start: 2022-01-13 | End: 2022-02-24 | Stop reason: SDUPTHER
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
## 2022-01-14 ENCOUNTER — PATIENT MESSAGE (OUTPATIENT)
Dept: FAMILY MEDICINE CLINIC | Age: 62
End: 2022-01-14

## 2022-01-14 DIAGNOSIS — M54.17 LUMBOSACRAL RADICULOPATHY DUE TO TRAUMA: ICD-10-CM

## 2022-01-14 DIAGNOSIS — G89.4 CHRONIC PAIN DISORDER: ICD-10-CM

## 2022-01-14 DIAGNOSIS — E78.5 HYPERLIPIDEMIA, UNSPECIFIED HYPERLIPIDEMIA TYPE: ICD-10-CM

## 2022-01-15 NOTE — TELEPHONE ENCOUNTER
From: Guy Powell  To: Nilson Else  Sent: 1/14/2022 12:51 PM EST  Subject: Medication refills    Hi Avi Douglas. Hope all is well with you. Can you please call in a refill to [facility] on [address] in [facility] for my generic Lipitor which I get a 90 day supply for because it is more cost-effective. I also need a refill on my Norco which also I get at [facility] in [facility]. Please let me know if you have any questions. Hopefully I will not need to see you until my next follow-up appointment which I believe is in April. Take care and thanks again for all you do for me.  Sonia Bird

## 2022-01-17 RX ORDER — ATORVASTATIN CALCIUM 10 MG/1
10 TABLET, FILM COATED ORAL DAILY
Qty: 90 TABLET | Refills: 1 | Status: SHIPPED | OUTPATIENT
Start: 2022-01-17 | End: 2022-07-13 | Stop reason: SDUPTHER

## 2022-01-17 RX ORDER — HYDROCODONE BITARTRATE AND ACETAMINOPHEN 7.5; 325 MG/1; MG/1
1 TABLET ORAL EVERY 8 HOURS PRN
Qty: 90 TABLET | Refills: 0 | Status: SHIPPED | OUTPATIENT
Start: 2022-01-17 | End: 2022-03-03 | Stop reason: SDUPTHER

## 2022-02-24 ENCOUNTER — TELEMEDICINE (OUTPATIENT)
Dept: BEHAVIORAL/MENTAL HEALTH CLINIC | Age: 62
End: 2022-02-24
Payer: MEDICARE

## 2022-02-24 DIAGNOSIS — F41.1 GAD (GENERALIZED ANXIETY DISORDER): Primary | ICD-10-CM

## 2022-02-24 DIAGNOSIS — F33.1 MAJOR DEPRESSIVE DISORDER, RECURRENT EPISODE, MODERATE (HCC): ICD-10-CM

## 2022-02-24 PROCEDURE — 99215 OFFICE O/P EST HI 40 MIN: CPT | Performed by: PSYCHIATRY & NEUROLOGY

## 2022-02-24 RX ORDER — LITHIUM CARBONATE 150 MG/1
150 CAPSULE ORAL NIGHTLY
Qty: 90 CAPSULE | Refills: 1 | Status: SHIPPED | OUTPATIENT
Start: 2022-02-24 | End: 2022-05-05

## 2022-02-24 RX ORDER — AMITRIPTYLINE HYDROCHLORIDE 150 MG/1
150 TABLET, FILM COATED ORAL NIGHTLY
Qty: 90 TABLET | Refills: 1 | Status: SHIPPED | OUTPATIENT
Start: 2022-02-24 | End: 2022-05-05 | Stop reason: SDUPTHER

## 2022-02-24 RX ORDER — PROPRANOLOL HYDROCHLORIDE 20 MG/1
20 TABLET ORAL 3 TIMES DAILY
Qty: 90 TABLET | Refills: 3 | Status: SHIPPED | OUTPATIENT
Start: 2022-02-24 | End: 2022-05-05 | Stop reason: SDUPTHER
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
## 2022-03-03 ENCOUNTER — PATIENT MESSAGE (OUTPATIENT)
Dept: FAMILY MEDICINE CLINIC | Age: 62
End: 2022-03-03

## 2022-03-03 DIAGNOSIS — G89.4 CHRONIC PAIN DISORDER: ICD-10-CM

## 2022-03-03 DIAGNOSIS — M54.17 LUMBOSACRAL RADICULOPATHY DUE TO TRAUMA: ICD-10-CM

## 2022-03-03 RX ORDER — HYDROCODONE BITARTRATE AND ACETAMINOPHEN 7.5; 325 MG/1; MG/1
1 TABLET ORAL EVERY 8 HOURS PRN
Qty: 90 TABLET | Refills: 0 | Status: SHIPPED | OUTPATIENT
Start: 2022-03-03 | End: 2022-04-12 | Stop reason: SDUPTHER

## 2022-03-03 NOTE — TELEPHONE ENCOUNTER
From: Lyndsey Rodriguez  To: Salvador Boudreaux  Sent: 3/3/2022 11:11 AM EST  Subject: [address] REFILL    Hi Dimascho mirage. Hope all is well with you. I need a refill on my Norco please. I have enough to last through Sunday. [facility] in [facility] is my pharmacy. Thanks again. Take care.  Levon Diallo

## 2022-03-28 RX ORDER — CARVEDILOL 12.5 MG/1
TABLET ORAL
Qty: 120 TABLET | Refills: 0 | Status: SHIPPED | OUTPATIENT
Start: 2022-03-28 | End: 2022-05-31 | Stop reason: SDUPTHER

## 2022-03-28 NOTE — TELEPHONE ENCOUNTER
Please approve or deny this refill request. The order is pended. Thank you.     LOV 1/12/2022    Next Visit Date:  Future Appointments   Date Time Provider Michelle Jaida   3/31/2022  3:30 PM Abigail Laura MD [facility]  [place]   4/4/2022  2:45 PM [facility]   7/14/2022  3:30 PM Anika Bennett MD [facility]

## 2022-03-31 ENCOUNTER — TELEMEDICINE (OUTPATIENT)
Dept: BEHAVIORAL/MENTAL HEALTH CLINIC | Age: 62
End: 2022-03-31
Payer: MEDICARE

## 2022-03-31 DIAGNOSIS — F41.1 GAD (GENERALIZED ANXIETY DISORDER): ICD-10-CM

## 2022-03-31 DIAGNOSIS — F33.1 MAJOR DEPRESSIVE DISORDER, RECURRENT EPISODE, MODERATE (HCC): ICD-10-CM

## 2022-03-31 DIAGNOSIS — F43.10 PTSD (POST-TRAUMATIC STRESS DISORDER): Primary | ICD-10-CM

## 2022-03-31 PROCEDURE — 99214 OFFICE O/P EST MOD 30 MIN: CPT | Performed by: PSYCHIATRY & NEUROLOGY
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
## 2022-04-12 ENCOUNTER — OFFICE VISIT (OUTPATIENT)
Dept: FAMILY MEDICINE CLINIC | Age: 62
End: 2022-04-12
Payer: MEDICARE

## 2022-04-12 VITALS
BODY MASS INDEX: 25.95 KG/M2 | HEART RATE: 84 BPM | OXYGEN SATURATION: 98 % | HEIGHT: 64 IN | DIASTOLIC BLOOD PRESSURE: 84 MMHG | SYSTOLIC BLOOD PRESSURE: 126 MMHG | WEIGHT: 152 LBS

## 2022-04-12 DIAGNOSIS — M54.17 LUMBOSACRAL RADICULOPATHY DUE TO TRAUMA: ICD-10-CM

## 2022-04-12 DIAGNOSIS — G89.4 CHRONIC PAIN DISORDER: Primary | ICD-10-CM

## 2022-04-12 DIAGNOSIS — K59.03 DRUG-INDUCED CONSTIPATION: ICD-10-CM

## 2022-04-12 PROCEDURE — 99213 OFFICE O/P EST LOW 20 MIN: CPT | Performed by: NURSE PRACTITIONER

## 2022-04-12 RX ORDER — HYDROCODONE BITARTRATE AND ACETAMINOPHEN 7.5; 325 MG/1; MG/1
1 TABLET ORAL EVERY 8 HOURS PRN
Qty: 90 TABLET | Refills: 0 | Status: SHIPPED | OUTPATIENT
Start: 2022-04-12 | End: 2022-05-12

## 2022-04-12 ASSESSMENT — ENCOUNTER SYMPTOMS
NAUSEA: 0
DIARRHEA: 0
ABDOMINAL PAIN: 1
CONSTIPATION: 1
ABDOMINAL DISTENTION: 1
VOMITING: 0
RESPIRATORY NEGATIVE: 1
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
## 2022-05-05 ENCOUNTER — TELEMEDICINE (OUTPATIENT)
Dept: BEHAVIORAL/MENTAL HEALTH CLINIC | Age: 62
End: 2022-05-05
Payer: MEDICARE

## 2022-05-05 DIAGNOSIS — E55.9 VITAMIN D DEFICIENCY, UNSPECIFIED: ICD-10-CM

## 2022-05-05 DIAGNOSIS — Z79.899 OTHER LONG TERM (CURRENT) DRUG THERAPY: ICD-10-CM

## 2022-05-05 DIAGNOSIS — F33.2 SEVERE EPISODE OF RECURRENT MAJOR DEPRESSIVE DISORDER, WITHOUT PSYCHOTIC FEATURES (HCC): Primary | ICD-10-CM

## 2022-05-05 DIAGNOSIS — M54.12 BRACHIAL NEURITIS OR RADICULITIS: ICD-10-CM

## 2022-05-05 PROCEDURE — 99215 OFFICE O/P EST HI 40 MIN: CPT | Performed by: PSYCHIATRY & NEUROLOGY

## 2022-05-05 RX ORDER — AMITRIPTYLINE HYDROCHLORIDE 150 MG/1
150 TABLET, FILM COATED ORAL NIGHTLY
Qty: 90 TABLET | Refills: 1 | Status: SHIPPED | OUTPATIENT
Start: 2022-05-05 | End: 2022-06-17

## 2022-05-05 RX ORDER — TOPIRAMATE 100 MG/1
100 TABLET, FILM COATED ORAL 2 TIMES DAILY
Qty: 180 TABLET | Refills: 1 | Status: SHIPPED | OUTPATIENT
Start: 2022-05-05 | End: 2022-11-01

## 2022-05-05 RX ORDER — PROPRANOLOL HYDROCHLORIDE 20 MG/1
20 TABLET ORAL 3 TIMES DAILY
Qty: 90 TABLET | Refills: 3 | Status: SHIPPED | OUTPATIENT
Start: 2022-05-05

## 2022-05-05 RX ORDER — LITHIUM CARBONATE 300 MG/1
300 TABLET, FILM COATED, EXTENDED RELEASE ORAL NIGHTLY
Qty: 30 TABLET | Refills: 3 | Status: SHIPPED | OUTPATIENT
Start: 2022-05-05 | End: 2022-06-17
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
## 2022-05-17 ENCOUNTER — PATIENT MESSAGE (OUTPATIENT)
Dept: BEHAVIORAL/MENTAL HEALTH CLINIC | Age: 62
End: 2022-05-17

## 2022-05-17 NOTE — TELEPHONE ENCOUNTER
From: Justine Dennis  To: Dr. Jimmy Floyd: 5/17/2022 2:14 PM EDT  Subject: Caroline Blancas. Do I need to fast before having any of the blood tests done that you've ordered for me? Please let me know so I can get the blood work done in time for you to receive the results for my appointment with you on May 23rd. Thanks.  Chichi Pritchard

## 2022-05-19 DIAGNOSIS — E55.9 VITAMIN D DEFICIENCY, UNSPECIFIED: ICD-10-CM

## 2022-05-19 DIAGNOSIS — F33.2 SEVERE EPISODE OF RECURRENT MAJOR DEPRESSIVE DISORDER, WITHOUT PSYCHOTIC FEATURES (HCC): ICD-10-CM

## 2022-05-19 DIAGNOSIS — Z79.899 OTHER LONG TERM (CURRENT) DRUG THERAPY: ICD-10-CM

## 2022-05-19 LAB
ALBUMIN SERPL-MCNC: 4.6 G/DL (ref 3.5–4.6)
ALP BLD-CCNC: 109 U/L (ref 40–130)
ALT SERPL-CCNC: 33 U/L (ref 0–33)
ANION GAP SERPL CALCULATED.3IONS-SCNC: 14 MEQ/L (ref 9–15)
AST SERPL-CCNC: 32 U/L (ref 0–35)
BASOPHILS ABSOLUTE: 0.1 K/UL (ref 0–0.2)
BASOPHILS RELATIVE PERCENT: 0.8 %
BILIRUB SERPL-MCNC: 0.4 MG/DL (ref 0.2–0.7)
BILIRUBIN DIRECT: <0.2 MG/DL (ref 0–0.4)
BILIRUBIN, INDIRECT: NORMAL MG/DL (ref 0–0.6)
BUN BLDV-MCNC: 13 MG/DL (ref 8–23)
CALCIUM SERPL-MCNC: 9.7 MG/DL (ref 8.5–9.9)
CHLORIDE BLD-SCNC: 103 MEQ/L (ref 95–107)
CO2: 21 MEQ/L (ref 20–31)
CREAT SERPL-MCNC: 0.79 MG/DL (ref 0.5–0.9)
EOSINOPHILS ABSOLUTE: 0.4 K/UL (ref 0–0.7)
EOSINOPHILS RELATIVE PERCENT: 3.1 %
GFR AFRICAN AMERICAN: >60
GFR NON-AFRICAN AMERICAN: >60
GLOBULIN: 2.8 G/DL (ref 2.3–3.5)
GLUCOSE BLD-MCNC: 112 MG/DL (ref 70–99)
HBA1C MFR BLD: 5.9 % (ref 4.8–5.9)
HCT VFR BLD CALC: 47 % (ref 37–47)
HEMOGLOBIN: 15.5 G/DL (ref 12–16)
LITHIUM LEVEL: 0.5 MEQ/L (ref 0.6–1.2)
LYMPHOCYTES ABSOLUTE: 3.4 K/UL (ref 1–4.8)
LYMPHOCYTES RELATIVE PERCENT: 29.1 %
MCH RBC QN AUTO: 30.8 PG (ref 27–31.3)
MCHC RBC AUTO-ENTMCNC: 33 % (ref 33–37)
MCV RBC AUTO: 93.6 FL (ref 82–100)
MONOCYTES ABSOLUTE: 0.6 K/UL (ref 0.2–0.8)
MONOCYTES RELATIVE PERCENT: 5 %
NEUTROPHILS ABSOLUTE: 7.2 K/UL (ref 1.4–6.5)
NEUTROPHILS RELATIVE PERCENT: 62 %
PDW BLD-RTO: 14.5 % (ref 11.5–14.5)
PLATELET # BLD: 274 K/UL (ref 130–400)
POTASSIUM SERPL-SCNC: 4.5 MEQ/L (ref 3.4–4.9)
RBC # BLD: 5.03 M/UL (ref 4.2–5.4)
SODIUM BLD-SCNC: 138 MEQ/L (ref 135–144)
TOTAL PROTEIN: 7.4 G/DL (ref 6.3–8)
TSH REFLEX: 1.67 UIU/ML (ref 0.44–3.86)
WBC # BLD: 11.6 K/UL (ref 4.8–10.8)

## 2022-05-20 LAB
FOLATE: >20 NG/ML
VITAMIN B-12: 1075 PG/ML (ref 232–1245)
VITAMIN D 25-HYDROXY: 31 NG/ML

## 2022-05-23 ENCOUNTER — TELEMEDICINE (OUTPATIENT)
Dept: BEHAVIORAL/MENTAL HEALTH CLINIC | Age: 62
End: 2022-05-23
Payer: MEDICARE

## 2022-05-23 DIAGNOSIS — F33.2 SEVERE EPISODE OF RECURRENT MAJOR DEPRESSIVE DISORDER, WITHOUT PSYCHOTIC FEATURES (HCC): Primary | ICD-10-CM

## 2022-05-23 DIAGNOSIS — F43.10 PTSD (POST-TRAUMATIC STRESS DISORDER): ICD-10-CM

## 2022-05-23 DIAGNOSIS — F41.1 GAD (GENERALIZED ANXIETY DISORDER): ICD-10-CM

## 2022-05-23 PROCEDURE — 99215 OFFICE O/P EST HI 40 MIN: CPT | Performed by: PSYCHIATRY & NEUROLOGY

## 2022-05-23 RX ORDER — METHYLPHENIDATE HYDROCHLORIDE 5 MG/1
5 TABLET ORAL 2 TIMES DAILY
Qty: 14 TABLET | Refills: 0 | Status: SHIPPED | OUTPATIENT
Start: 2022-05-23 | End: 2022-05-30
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
## 2022-05-25 DIAGNOSIS — M96.1 POSTLAMINECTOMY SYNDROME, CERVICAL REGION: ICD-10-CM

## 2022-05-25 DIAGNOSIS — G89.4 CHRONIC PAIN DISORDER: Primary | ICD-10-CM

## 2022-05-25 DIAGNOSIS — M54.17 LUMBOSACRAL RADICULOPATHY DUE TO TRAUMA: ICD-10-CM

## 2022-05-25 RX ORDER — HYDROCODONE BITARTRATE AND ACETAMINOPHEN 7.5; 325 MG/1; MG/1
1 TABLET ORAL EVERY 8 HOURS PRN
Qty: 90 TABLET | Refills: 0 | Status: SHIPPED | OUTPATIENT
Start: 2022-05-25 | End: 2022-06-24

## 2022-05-27 ENCOUNTER — PATIENT MESSAGE (OUTPATIENT)
Dept: BEHAVIORAL/MENTAL HEALTH CLINIC | Age: 62
End: 2022-05-27

## 2022-05-27 DIAGNOSIS — F90.0 ATTENTION DEFICIT HYPERACTIVITY DISORDER (ADHD), PREDOMINANTLY INATTENTIVE TYPE: Primary | ICD-10-CM

## 2022-05-27 NOTE — TELEPHONE ENCOUNTER
Hi Dr. Perez Sayres. Hope all is well with you. I feel a little better since starting the rx above. I've been taking 2 daily. On a scale of 1 to 10 I'd give it about a 5 so maybe it needs to be increased. It has not made me jittery or hyper and I don't seem to have any side effects from it. My insurance company hasn't approved the authorization yet that you submitted. They said I could call Monday (they're there on the holiday) to check the status which should be completed by then. I'll reach out to you Tuesday to let you know if it's approved and how I'm still doing with it. I have enough of the medication to last through Tuesday. Thanks again for all you are doing t help me.  Lenard Gutierrez

## 2022-05-31 DIAGNOSIS — R00.0 TACHYCARDIA: ICD-10-CM

## 2022-05-31 DIAGNOSIS — I10 ESSENTIAL HYPERTENSION: Primary | ICD-10-CM

## 2022-05-31 RX ORDER — CARVEDILOL 12.5 MG/1
TABLET ORAL
Qty: 180 TABLET | Refills: 3 | Status: SHIPPED | OUTPATIENT
Start: 2022-05-31

## 2022-05-31 NOTE — TELEPHONE ENCOUNTER
Please approve or deny this refill request. The order is pended. Thank you.     LOV 1/12/2022    Next Visit Date:  Future Appointments   Date Time Provider Michelle Jaida   6/10/2022  2:00 PM Neva Yang MD [facility]  [place]   7/12/2022  3:45 PM Jamari Fleming, APRN - CNP [facility] EMERGENCY [facility] [place]   7/14/2022  3:30 PM Shane Mcclure MD [facility]

## 2022-06-03 RX ORDER — METHYLPHENIDATE HYDROCHLORIDE 10 MG/1
10 TABLET ORAL 2 TIMES DAILY
Qty: 60 TABLET | Refills: 0 | Status: SHIPPED | OUTPATIENT
Start: 2022-06-03 | End: 2022-06-17 | Stop reason: SDUPTHER

## 2022-06-03 NOTE — TELEPHONE ENCOUNTER
REFILL REQUEST UPDATE FROM PHYSICIAN    Requested Prescriptions     Signed Prescriptions Disp Refills    methylphenidate (RITALIN) 10 MG tablet 60 tablet 0     Sig: Take 1 tablet by mouth 2 times daily for 30 days. Authorizing Provider: Claudell Cookey A       The following medications were refilled per patient request to the pharmacy identified as patient's preference pharmacy. Prior to refill, the [facility] Lover has been reviewed as needed for controlled substance monitoring and no concerns were identified. Orders Placed This Encounter   Medications    methylphenidate (RITALIN) 10 MG tablet     Sig: Take 1 tablet by mouth 2 times daily for 30 days. Dispense:  60 tablet     Refill:  0       Please inform the patient of any pending lab work, so they may complete this prior to the next medication fill.      Pradip Brothers MD

## 2022-06-17 ENCOUNTER — TELEMEDICINE (OUTPATIENT)
Dept: BEHAVIORAL/MENTAL HEALTH CLINIC | Age: 62
End: 2022-06-17
Payer: MEDICARE

## 2022-06-17 DIAGNOSIS — F41.1 GAD (GENERALIZED ANXIETY DISORDER): ICD-10-CM

## 2022-06-17 DIAGNOSIS — F90.0 ATTENTION DEFICIT HYPERACTIVITY DISORDER (ADHD), PREDOMINANTLY INATTENTIVE TYPE: ICD-10-CM

## 2022-06-17 DIAGNOSIS — F33.0 MILD EPISODE OF RECURRENT MAJOR DEPRESSIVE DISORDER (HCC): Primary | ICD-10-CM

## 2022-06-17 PROCEDURE — 99215 OFFICE O/P EST HI 40 MIN: CPT | Performed by: PSYCHIATRY & NEUROLOGY

## 2022-06-17 RX ORDER — METHYLPHENIDATE HYDROCHLORIDE 10 MG/1
10 TABLET ORAL 2 TIMES DAILY
Qty: 60 TABLET | Refills: 0 | Status: SHIPPED | OUTPATIENT
Start: 2022-07-01 | End: 2022-08-12 | Stop reason: SDUPTHER

## 2022-06-17 RX ORDER — AMITRIPTYLINE HYDROCHLORIDE 75 MG/1
TABLET, FILM COATED ORAL
Qty: 21 TABLET | Refills: 0 | Status: SHIPPED | OUTPATIENT
Start: 2022-06-17 | End: 2022-07-19

## 2022-06-17 RX ORDER — LITHIUM CARBONATE 150 MG/1
150 CAPSULE ORAL DAILY
Qty: 30 CAPSULE | Refills: 3 | Status: SHIPPED | OUTPATIENT
Start: 2022-06-17 | End: 2022-10-15
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
## 2022-06-29 ENCOUNTER — TELEPHONE (OUTPATIENT)
Dept: FAMILY MEDICINE CLINIC | Age: 62
End: 2022-06-29

## 2022-07-04 ENCOUNTER — PATIENT MESSAGE (OUTPATIENT)
Dept: FAMILY MEDICINE CLINIC | Age: 62
End: 2022-07-04

## 2022-07-04 DIAGNOSIS — M54.17 LUMBOSACRAL RADICULOPATHY DUE TO TRAUMA: ICD-10-CM

## 2022-07-04 DIAGNOSIS — G89.4 CHRONIC PAIN DISORDER: Primary | ICD-10-CM

## 2022-07-04 DIAGNOSIS — M96.1 POSTLAMINECTOMY SYNDROME, CERVICAL REGION: ICD-10-CM

## 2022-07-05 RX ORDER — HYDROCODONE BITARTRATE AND ACETAMINOPHEN 7.5; 325 MG/1; MG/1
1 TABLET ORAL EVERY 8 HOURS PRN
Qty: 90 TABLET | Refills: 0 | Status: SHIPPED | OUTPATIENT
Start: 2022-07-05 | End: 2022-08-15 | Stop reason: SDUPTHER

## 2022-07-05 NOTE — TELEPHONE ENCOUNTER
From: Robert Israel  To: Bere Kaur  Sent: 7/4/2022 10:54 PM EDT  Subject: RX REFILL    Hi Sin Colon. Hope you had a nice holiday weekend. I need a refill of my Tanmay Batter in [facility] is my pharmacy. I have an appointment with you next week so I will see you then. Thank you. Take care.  Anthony Guillen

## 2022-07-13 ENCOUNTER — OFFICE VISIT (OUTPATIENT)
Dept: FAMILY MEDICINE CLINIC | Age: 62
End: 2022-07-13
Payer: MEDICARE

## 2022-07-13 VITALS
WEIGHT: 154 LBS | HEART RATE: 86 BPM | SYSTOLIC BLOOD PRESSURE: 124 MMHG | BODY MASS INDEX: 26.29 KG/M2 | DIASTOLIC BLOOD PRESSURE: 72 MMHG | HEIGHT: 64 IN | OXYGEN SATURATION: 100 %

## 2022-07-13 DIAGNOSIS — E78.5 HYPERLIPIDEMIA, UNSPECIFIED HYPERLIPIDEMIA TYPE: ICD-10-CM

## 2022-07-13 DIAGNOSIS — D72.829 LEUKOCYTOSIS, UNSPECIFIED TYPE: Primary | ICD-10-CM

## 2022-07-13 DIAGNOSIS — F41.9 ANXIETY AND DEPRESSION: ICD-10-CM

## 2022-07-13 DIAGNOSIS — M54.17 LUMBOSACRAL RADICULOPATHY DUE TO TRAUMA: ICD-10-CM

## 2022-07-13 DIAGNOSIS — F32.A ANXIETY AND DEPRESSION: ICD-10-CM

## 2022-07-13 PROCEDURE — 99214 OFFICE O/P EST MOD 30 MIN: CPT | Performed by: NURSE PRACTITIONER

## 2022-07-13 RX ORDER — ATORVASTATIN CALCIUM 10 MG/1
10 TABLET, FILM COATED ORAL DAILY
Qty: 90 TABLET | Refills: 1 | Status: SHIPPED | OUTPATIENT
Start: 2022-07-13

## 2022-07-13 ASSESSMENT — PATIENT HEALTH QUESTIONNAIRE - PHQ9
8. MOVING OR SPEAKING SO SLOWLY THAT OTHER PEOPLE COULD HAVE NOTICED. OR THE OPPOSITE, BEING SO FIGETY OR RESTLESS THAT YOU HAVE BEEN MOVING AROUND A LOT MORE THAN USUAL: 0
5. POOR APPETITE OR OVEREATING: 0
3. TROUBLE FALLING OR STAYING ASLEEP: 0
2. FEELING DOWN, DEPRESSED OR HOPELESS: 0
SUM OF ALL RESPONSES TO PHQ QUESTIONS 1-9: 0
6. FEELING BAD ABOUT YOURSELF - OR THAT YOU ARE A FAILURE OR HAVE LET YOURSELF OR YOUR FAMILY DOWN: 0
7. TROUBLE CONCENTRATING ON THINGS, SUCH AS READING THE NEWSPAPER OR WATCHING TELEVISION: 0
9. THOUGHTS THAT YOU WOULD BE BETTER OFF DEAD, OR OF HURTING YOURSELF: 0
SUM OF ALL RESPONSES TO PHQ QUESTIONS 1-9: 0
SUM OF ALL RESPONSES TO PHQ QUESTIONS 1-9: 0
SUM OF ALL RESPONSES TO PHQ9 QUESTIONS 1 & 2: 0
1. LITTLE INTEREST OR PLEASURE IN DOING THINGS: 0
SUM OF ALL RESPONSES TO PHQ QUESTIONS 1-9: 0
10. IF YOU CHECKED OFF ANY PROBLEMS, HOW DIFFICULT HAVE THESE PROBLEMS MADE IT FOR YOU TO DO YOUR WORK, TAKE CARE OF THINGS AT HOME, OR GET ALONG WITH OTHER PEOPLE: 0
4. FEELING TIRED OR HAVING LITTLE ENERGY: 0

## 2022-07-13 ASSESSMENT — ENCOUNTER SYMPTOMS
BACK PAIN: 1
SHORTNESS OF BREATH: 0
COUGH: 0
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
## 2022-07-19 ENCOUNTER — TELEMEDICINE (OUTPATIENT)
Dept: BEHAVIORAL/MENTAL HEALTH CLINIC | Age: 62
End: 2022-07-19
Payer: MEDICARE

## 2022-07-19 DIAGNOSIS — F33.1 MODERATE EPISODE OF RECURRENT MAJOR DEPRESSIVE DISORDER (HCC): Primary | ICD-10-CM

## 2022-07-19 DIAGNOSIS — F41.1 GAD (GENERALIZED ANXIETY DISORDER): ICD-10-CM

## 2022-07-19 PROCEDURE — 99215 OFFICE O/P EST HI 40 MIN: CPT | Performed by: PSYCHIATRY & NEUROLOGY

## 2022-07-19 RX ORDER — AMITRIPTYLINE HYDROCHLORIDE 150 MG/1
150 TABLET, FILM COATED ORAL NIGHTLY
Qty: 30 TABLET | Refills: 3 | Status: SHIPPED | OUTPATIENT
Start: 2022-07-19 | End: 2022-09-22 | Stop reason: SDUPTHER
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
## 2022-08-11 DIAGNOSIS — F90.0 ATTENTION DEFICIT HYPERACTIVITY DISORDER (ADHD), PREDOMINANTLY INATTENTIVE TYPE: ICD-10-CM

## 2022-08-11 NOTE — TELEPHONE ENCOUNTER
Rx requested:  Requested Prescriptions     Pending Prescriptions Disp Refills    methylphenidate (RITALIN) 10 MG tablet 60 tablet 0     Sig: Take 1 tablet by mouth in the morning and 1 tablet before bedtime. Do all this for 30 days.        Last Office Visit:   7/19/2022      Next Visit Date:  Future Appointments   Date Time Provider Michelle Sena   8/25/2022  3:00 PM Rohith Dugan MD [facility]  [place]   10/13/2022  3:30 PM RINA Urban - CNP [facility] EMERGENCY MEDICAL [place] AT [place]

## 2022-08-12 RX ORDER — METHYLPHENIDATE HYDROCHLORIDE 10 MG/1
10 TABLET ORAL 2 TIMES DAILY
Qty: 60 TABLET | Refills: 0 | Status: SHIPPED | OUTPATIENT
Start: 2022-08-12 | End: 2022-09-11

## 2022-08-15 ENCOUNTER — PATIENT MESSAGE (OUTPATIENT)
Dept: FAMILY MEDICINE CLINIC | Age: 62
End: 2022-08-15

## 2022-08-15 DIAGNOSIS — M54.17 LUMBOSACRAL RADICULOPATHY DUE TO TRAUMA: ICD-10-CM

## 2022-08-15 DIAGNOSIS — G89.4 CHRONIC PAIN DISORDER: ICD-10-CM

## 2022-08-15 DIAGNOSIS — M96.1 POSTLAMINECTOMY SYNDROME, CERVICAL REGION: ICD-10-CM

## 2022-08-15 RX ORDER — HYDROCODONE BITARTRATE AND ACETAMINOPHEN 7.5; 325 MG/1; MG/1
1 TABLET ORAL EVERY 8 HOURS PRN
Qty: 90 TABLET | Refills: 0 | Status: SHIPPED | OUTPATIENT
Start: 2022-08-15 | End: 2022-10-03 | Stop reason: SDUPTHER

## 2022-08-15 NOTE — TELEPHONE ENCOUNTER
From: Vidal Ibarra  To: Ann Jeffery  Sent: 8/15/2022 1:45 PM EDT  Subject: Shital Goldstein. Hope things are going well with you. I need a refill for my [place] [facility] please. [id number] [facility] is still my pharmacy. I'll see you in a few months at my follow-up appt.  Thank you

## 2022-08-25 ENCOUNTER — OFFICE VISIT (OUTPATIENT)
Dept: BEHAVIORAL/MENTAL HEALTH CLINIC | Age: 62
End: 2022-08-25
Payer: MEDICARE

## 2022-08-25 VITALS
SYSTOLIC BLOOD PRESSURE: 124 MMHG | WEIGHT: 155 LBS | BODY MASS INDEX: 27.03 KG/M2 | DIASTOLIC BLOOD PRESSURE: 72 MMHG | HEART RATE: 78 BPM

## 2022-08-25 DIAGNOSIS — F33.0 MILD EPISODE OF RECURRENT MAJOR DEPRESSIVE DISORDER (HCC): ICD-10-CM

## 2022-08-25 DIAGNOSIS — F90.0 ATTENTION DEFICIT HYPERACTIVITY DISORDER (ADHD), PREDOMINANTLY INATTENTIVE TYPE: Primary | ICD-10-CM

## 2022-08-25 PROCEDURE — 99214 OFFICE O/P EST MOD 30 MIN: CPT | Performed by: PSYCHIATRY & NEUROLOGY

## 2022-08-25 RX ORDER — DEXMETHYLPHENIDATE HYDROCHLORIDE 10 MG/1
10 CAPSULE, EXTENDED RELEASE ORAL
Qty: 30 CAPSULE | Refills: 0 | Status: SHIPPED | OUTPATIENT
Start: 2022-08-25 | End: 2022-09-13 | Stop reason: SDUPTHER
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
## 2022-09-13 DIAGNOSIS — F90.0 ATTENTION DEFICIT HYPERACTIVITY DISORDER (ADHD), PREDOMINANTLY INATTENTIVE TYPE: ICD-10-CM

## 2022-09-13 RX ORDER — DEXMETHYLPHENIDATE HYDROCHLORIDE 10 MG/1
10 CAPSULE, EXTENDED RELEASE ORAL
Qty: 30 CAPSULE | Refills: 0 | Status: SHIPPED | OUTPATIENT
Start: 2022-09-13 | End: 2022-10-24

## 2022-09-13 NOTE — TELEPHONE ENCOUNTER
requesting medication refill. Rx requested:  Requested Prescriptions     Pending Prescriptions Disp Refills    Dexmethylphenidate HCl ER (FOCALIN XR) 10 MG CP24 30 capsule 0     Sig: Take 10 mg by mouth every morning (before breakfast) for 30 days.        Last Office Visit:   8/25/2022        Next Visit Date:  Future Appointments   Date Time Provider Michelle Sena   9/22/2022  2:30 PM Kaitlyn Quintero MD [facility]  [place]   10/13/2022  3:30 PM RINA Monahan - CNP [facility] [address] = [facility]

## 2022-09-22 ENCOUNTER — TELEMEDICINE (OUTPATIENT)
Dept: BEHAVIORAL/MENTAL HEALTH CLINIC | Age: 62
End: 2022-09-22
Payer: MEDICARE

## 2022-09-22 DIAGNOSIS — F33.0 MILD EPISODE OF RECURRENT MAJOR DEPRESSIVE DISORDER (HCC): ICD-10-CM

## 2022-09-22 DIAGNOSIS — F90.0 ATTENTION DEFICIT HYPERACTIVITY DISORDER (ADHD), PREDOMINANTLY INATTENTIVE TYPE: Primary | ICD-10-CM

## 2022-09-22 DIAGNOSIS — F41.1 GAD (GENERALIZED ANXIETY DISORDER): ICD-10-CM

## 2022-09-22 PROCEDURE — 99214 OFFICE O/P EST MOD 30 MIN: CPT | Performed by: PSYCHIATRY & NEUROLOGY

## 2022-09-22 RX ORDER — AMITRIPTYLINE HYDROCHLORIDE 150 MG/1
150 TABLET, FILM COATED ORAL NIGHTLY
Qty: 90 TABLET | Refills: 1 | Status: SHIPPED | OUTPATIENT
Start: 2022-09-22

## 2022-09-22 RX ORDER — DEXMETHYLPHENIDATE HYDROCHLORIDE 10 MG/1
10 CAPSULE, EXTENDED RELEASE ORAL DAILY
Qty: 30 CAPSULE | Refills: 0 | Status: SHIPPED | OUTPATIENT
Start: 2022-10-11 | End: 2022-10-24 | Stop reason: SDUPTHER
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
## 2022-10-02 ENCOUNTER — PATIENT MESSAGE (OUTPATIENT)
Dept: FAMILY MEDICINE CLINIC | Age: 62
End: 2022-10-02

## 2022-10-02 DIAGNOSIS — M54.17 LUMBOSACRAL RADICULOPATHY DUE TO TRAUMA: ICD-10-CM

## 2022-10-02 DIAGNOSIS — M96.1 POSTLAMINECTOMY SYNDROME, CERVICAL REGION: ICD-10-CM

## 2022-10-02 DIAGNOSIS — G89.4 CHRONIC PAIN DISORDER: ICD-10-CM

## 2022-10-03 RX ORDER — HYDROCODONE BITARTRATE AND ACETAMINOPHEN 7.5; 325 MG/1; MG/1
1 TABLET ORAL EVERY 8 HOURS PRN
Qty: 90 TABLET | Refills: 0 | Status: SHIPPED | OUTPATIENT
Start: 2022-10-03 | End: 2022-11-02

## 2022-10-03 NOTE — TELEPHONE ENCOUNTER
From: Alejandra Olivera  To: Ashlie Morley  Sent: 10/2/2022 5:49 PM EDT  Subject: [place] [employer]ighter. Hope all is well. Can you please send a refill for my [place] to [facility] on [facility]. [facility]. I'm leaving Thursday for my granddaughter's wedding in [address] so hopefully I can pick it up Wednesday. I'll see you at my follow up appointment October 13th. Thanks again. Take care.  Mauricio Thompson

## 2022-10-11 DIAGNOSIS — E78.5 HYPERLIPIDEMIA, UNSPECIFIED HYPERLIPIDEMIA TYPE: ICD-10-CM

## 2022-10-11 DIAGNOSIS — D72.829 LEUKOCYTOSIS, UNSPECIFIED TYPE: ICD-10-CM

## 2022-10-11 LAB
BASOPHILS ABSOLUTE: 0.1 K/UL (ref 0–0.2)
BASOPHILS RELATIVE PERCENT: 0.7 %
CHOLESTEROL, TOTAL: 209 MG/DL (ref 0–199)
EOSINOPHILS ABSOLUTE: 0.4 K/UL (ref 0–0.7)
EOSINOPHILS RELATIVE PERCENT: 3.3 %
HCT VFR BLD CALC: 44.8 % (ref 37–47)
HDLC SERPL-MCNC: 42 MG/DL (ref 40–59)
HEMOGLOBIN: 14.9 G/DL (ref 12–16)
LDL CHOLESTEROL CALCULATED: 117 MG/DL (ref 0–129)
LYMPHOCYTES ABSOLUTE: 3.4 K/UL (ref 1–4.8)
LYMPHOCYTES RELATIVE PERCENT: 26.5 %
MCH RBC QN AUTO: 31.1 PG (ref 27–31.3)
MCHC RBC AUTO-ENTMCNC: 33.2 % (ref 33–37)
MCV RBC AUTO: 93.7 FL (ref 82–100)
MONOCYTES ABSOLUTE: 0.9 K/UL (ref 0.2–0.8)
MONOCYTES RELATIVE PERCENT: 6.9 %
NEUTROPHILS ABSOLUTE: 8.2 K/UL (ref 1.4–6.5)
NEUTROPHILS RELATIVE PERCENT: 62.6 %
PDW BLD-RTO: 14 % (ref 11.5–14.5)
PLATELET # BLD: 315 K/UL (ref 130–400)
RBC # BLD: 4.77 M/UL (ref 4.2–5.4)
TRIGL SERPL-MCNC: 250 MG/DL (ref 0–150)
WBC # BLD: 13 K/UL (ref 4.8–10.8)

## 2022-10-13 ENCOUNTER — OFFICE VISIT (OUTPATIENT)
Dept: FAMILY MEDICINE CLINIC | Age: 62
End: 2022-10-13
Payer: MEDICARE

## 2022-10-13 VITALS
HEART RATE: 65 BPM | BODY MASS INDEX: 26.46 KG/M2 | WEIGHT: 155 LBS | DIASTOLIC BLOOD PRESSURE: 82 MMHG | OXYGEN SATURATION: 97 % | HEIGHT: 64 IN | SYSTOLIC BLOOD PRESSURE: 128 MMHG

## 2022-10-13 DIAGNOSIS — R10.84 GENERALIZED ABDOMINAL PAIN: ICD-10-CM

## 2022-10-13 DIAGNOSIS — Z12.31 ENCOUNTER FOR SCREENING MAMMOGRAM FOR MALIGNANT NEOPLASM OF BREAST: ICD-10-CM

## 2022-10-13 DIAGNOSIS — R10.84 GENERALIZED ABDOMINAL PAIN: Primary | ICD-10-CM

## 2022-10-13 DIAGNOSIS — D72.829 LEUKOCYTOSIS, UNSPECIFIED TYPE: ICD-10-CM

## 2022-10-13 DIAGNOSIS — R35.0 URINARY FREQUENCY: ICD-10-CM

## 2022-10-13 LAB
ALBUMIN SERPL-MCNC: 4.6 G/DL (ref 3.5–4.6)
ALP BLD-CCNC: 117 U/L (ref 40–130)
ALT SERPL-CCNC: 28 U/L (ref 0–33)
ANION GAP SERPL CALCULATED.3IONS-SCNC: 13 MEQ/L (ref 9–15)
AST SERPL-CCNC: 24 U/L (ref 0–35)
BILIRUB SERPL-MCNC: 0.3 MG/DL (ref 0.2–0.7)
BILIRUBIN, POC: NORMAL
BLOOD URINE, POC: NORMAL
BUN BLDV-MCNC: 11 MG/DL (ref 8–23)
CALCIUM SERPL-MCNC: 10.1 MG/DL (ref 8.5–9.9)
CHLORIDE BLD-SCNC: 102 MEQ/L (ref 95–107)
CLARITY, POC: CLEAR
CO2: 23 MEQ/L (ref 20–31)
COLOR, POC: CLEAR
CREAT SERPL-MCNC: 0.72 MG/DL (ref 0.5–0.9)
GFR AFRICAN AMERICAN: >60
GFR NON-AFRICAN AMERICAN: >60
GLOBULIN: 2.8 G/DL (ref 2.3–3.5)
GLUCOSE BLD-MCNC: 93 MG/DL (ref 70–99)
GLUCOSE URINE, POC: NORMAL
KETONES, POC: NORMAL
LEUKOCYTE EST, POC: NORMAL
NITRITE, POC: NORMAL
PH, POC: 6.5
POTASSIUM SERPL-SCNC: 4.1 MEQ/L (ref 3.4–4.9)
PROTEIN, POC: NORMAL
SODIUM BLD-SCNC: 138 MEQ/L (ref 135–144)
SPECIFIC GRAVITY, POC: 1
TOTAL PROTEIN: 7.4 G/DL (ref 6.3–8)
UROBILINOGEN, POC: NORMAL

## 2022-10-13 PROCEDURE — 99214 OFFICE O/P EST MOD 30 MIN: CPT | Performed by: NURSE PRACTITIONER

## 2022-10-13 PROCEDURE — 81003 URINALYSIS AUTO W/O SCOPE: CPT | Performed by: NURSE PRACTITIONER

## 2022-10-13 SDOH — ECONOMIC STABILITY: FOOD INSECURITY: WITHIN THE PAST 12 MONTHS, YOU WORRIED THAT YOUR FOOD WOULD RUN OUT BEFORE YOU GOT MONEY TO BUY MORE.: NEVER TRUE

## 2022-10-13 SDOH — ECONOMIC STABILITY: FOOD INSECURITY: WITHIN THE PAST 12 MONTHS, THE FOOD YOU BOUGHT JUST DIDN'T LAST AND YOU DIDN'T HAVE MONEY TO GET MORE.: NEVER TRUE

## 2022-10-13 ASSESSMENT — SOCIAL DETERMINANTS OF HEALTH (SDOH): HOW HARD IS IT FOR YOU TO PAY FOR THE VERY BASICS LIKE FOOD, HOUSING, MEDICAL CARE, AND HEATING?: NOT HARD AT ALL

## 2022-10-13 ASSESSMENT — ENCOUNTER SYMPTOMS
COUGH: 0
ABDOMINAL PAIN: 1
SHORTNESS OF BREATH: 0
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
## 2022-10-14 LAB — URINE CULTURE, ROUTINE: NORMAL

## 2022-10-24 ENCOUNTER — TELEMEDICINE (OUTPATIENT)
Dept: BEHAVIORAL/MENTAL HEALTH CLINIC | Age: 62
End: 2022-10-24
Payer: MEDICARE

## 2022-10-24 DIAGNOSIS — F41.1 GAD (GENERALIZED ANXIETY DISORDER): ICD-10-CM

## 2022-10-24 DIAGNOSIS — F90.0 ATTENTION DEFICIT HYPERACTIVITY DISORDER (ADHD), PREDOMINANTLY INATTENTIVE TYPE: ICD-10-CM

## 2022-10-24 DIAGNOSIS — F33.1 MAJOR DEPRESSIVE DISORDER, RECURRENT EPISODE, MODERATE (HCC): Primary | ICD-10-CM

## 2022-10-24 PROCEDURE — 99214 OFFICE O/P EST MOD 30 MIN: CPT | Performed by: PSYCHIATRY & NEUROLOGY

## 2022-10-24 PROCEDURE — 90833 PSYTX W PT W E/M 30 MIN: CPT | Performed by: PSYCHIATRY & NEUROLOGY

## 2022-10-24 RX ORDER — DEXMETHYLPHENIDATE HYDROCHLORIDE 10 MG/1
10 CAPSULE, EXTENDED RELEASE ORAL DAILY
Qty: 30 CAPSULE | Refills: 0 | Status: SHIPPED | OUTPATIENT
Start: 2022-11-05 | End: 2022-11-29 | Stop reason: SDUPTHER
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
## 2022-10-27 ENCOUNTER — HOSPITAL ENCOUNTER (OUTPATIENT)
Dept: CT IMAGING | Age: 62
Discharge: HOME | End: 2022-10-29
Payer: MEDICARE

## 2022-10-27 ENCOUNTER — HOSPITAL ENCOUNTER (OUTPATIENT)
Dept: WOMENS IMAGING | Age: 62
Discharge: HOME | End: 2022-10-29
Payer: MEDICARE

## 2022-10-27 DIAGNOSIS — Z12.31 ENCOUNTER FOR SCREENING MAMMOGRAM FOR MALIGNANT NEOPLASM OF BREAST: ICD-10-CM

## 2022-10-27 PROCEDURE — 6360000004 HC RX CONTRAST MEDICATION: Performed by: NURSE PRACTITIONER

## 2022-10-27 PROCEDURE — 74177 CT ABD & PELVIS W/CONTRAST: CPT

## 2022-10-27 PROCEDURE — 77063 BREAST TOMOSYNTHESIS BI: CPT

## 2022-10-27 RX ADMIN — IOPAMIDOL 50 ML: 612 INJECTION, SOLUTION INTRAVENOUS at 16:12

## 2022-10-31 ENCOUNTER — OFFICE VISIT (OUTPATIENT)
Dept: FAMILY MEDICINE CLINIC | Age: 62
End: 2022-10-31
Payer: MEDICARE

## 2022-10-31 VITALS
HEART RATE: 71 BPM | WEIGHT: 155 LBS | SYSTOLIC BLOOD PRESSURE: 132 MMHG | DIASTOLIC BLOOD PRESSURE: 78 MMHG | BODY MASS INDEX: 27.03 KG/M2 | TEMPERATURE: 98 F | OXYGEN SATURATION: 98 %

## 2022-10-31 DIAGNOSIS — K59.00 CONSTIPATION, UNSPECIFIED CONSTIPATION TYPE: ICD-10-CM

## 2022-10-31 DIAGNOSIS — N83.201 CYST OF RIGHT OVARY: Primary | ICD-10-CM

## 2022-10-31 DIAGNOSIS — R10.84 GENERALIZED ABDOMINAL PAIN: ICD-10-CM

## 2022-10-31 PROCEDURE — 99213 OFFICE O/P EST LOW 20 MIN: CPT | Performed by: NURSE PRACTITIONER

## 2022-10-31 ASSESSMENT — ENCOUNTER SYMPTOMS
VOMITING: 0
CHEST TIGHTNESS: 0
CONSTIPATION: 1
COUGH: 0
BACK PAIN: 0
SHORTNESS OF BREATH: 0
DIARRHEA: 0
NAUSEA: 1
ABDOMINAL PAIN: 1
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
## 2022-11-04 DIAGNOSIS — N83.209 CYST OF OVARY, UNSPECIFIED LATERALITY: Primary | ICD-10-CM

## 2022-11-29 ENCOUNTER — TELEMEDICINE (OUTPATIENT)
Dept: BEHAVIORAL/MENTAL HEALTH CLINIC | Age: 62
End: 2022-11-29
Payer: MEDICARE

## 2022-11-29 DIAGNOSIS — F90.0 ATTENTION DEFICIT HYPERACTIVITY DISORDER (ADHD), PREDOMINANTLY INATTENTIVE TYPE: ICD-10-CM

## 2022-11-29 DIAGNOSIS — F41.1 GAD (GENERALIZED ANXIETY DISORDER): Primary | ICD-10-CM

## 2022-11-29 PROCEDURE — 99214 OFFICE O/P EST MOD 30 MIN: CPT | Performed by: PSYCHIATRY & NEUROLOGY

## 2022-11-29 RX ORDER — DEXMETHYLPHENIDATE HYDROCHLORIDE 10 MG/1
10 CAPSULE, EXTENDED RELEASE ORAL DAILY
Qty: 30 CAPSULE | Refills: 0 | Status: SHIPPED | OUTPATIENT
Start: 2022-11-29 | End: 2022-12-29
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
## 2022-12-19 ENCOUNTER — OFFICE VISIT (OUTPATIENT)
Dept: OBGYN CLINIC | Age: 62
End: 2022-12-19
Payer: MEDICARE

## 2022-12-19 VITALS
BODY MASS INDEX: 26.8 KG/M2 | HEART RATE: 84 BPM | DIASTOLIC BLOOD PRESSURE: 88 MMHG | WEIGHT: 157 LBS | HEIGHT: 64 IN | SYSTOLIC BLOOD PRESSURE: 134 MMHG

## 2022-12-19 DIAGNOSIS — N83.209 CYST OF OVARY, UNSPECIFIED LATERALITY: Primary | ICD-10-CM

## 2022-12-19 DIAGNOSIS — C57.7 MALIGNANT NEOPLASM OF OTHER SPECIFIED FEMALE GENITAL ORGANS (HCC): ICD-10-CM

## 2022-12-19 PROCEDURE — 99203 OFFICE O/P NEW LOW 30 MIN: CPT | Performed by: OBSTETRICS & GYNECOLOGY

## 2022-12-19 RX ORDER — OMEPRAZOLE 20 MG/1
CAPSULE, DELAYED RELEASE ORAL
COMMUNITY
Start: 2022-08-15

## 2022-12-19 RX ORDER — ESTRADIOL 0.1 MG/G
1 CREAM VAGINAL DAILY
Qty: 1 EACH | Refills: 3 | Status: SHIPPED | OUTPATIENT
Start: 2022-12-19

## 2022-12-19 RX ORDER — HYDROCODONE BITARTRATE AND ACETAMINOPHEN 7.5; 325 MG/1; MG/1
TABLET ORAL
COMMUNITY
Start: 2022-01-03 | End: 2022-12-20

## 2022-12-26 NOTE — PROGRESS NOTES
Domingo Berger is a 58 y.o. female who presents here today for complaints of Ovarian Cyst (CT done 10/27/22. US ordered but not done yet. Referred by Humphrey Lee.)    CT scan :   Incidental cystic   structure 4.6 cm in size on the right ovary in which 6-12 month follow-up is   recommended     .       Vitals:  /88 (Site: Right Upper Arm, Position: Sitting, Cuff Size: Medium Adult)   Pulse 84   Ht 5' 3.5\" (1.613 m)   Wt 157 lb (71.2 kg)   BMI 27.38 kg/m²   Allergies:  Latex, Fentanyl, Ketoprofen, Morphine, Nsaids, Tetracycline, Cefaclor, Cymbalta [duloxetine hcl], and Tetracyclines & related  Past Medical History:   Diagnosis Date    Anxiety     Arthritis     Depression     Drug effect     Hx of blood clots     Hyperlipidemia     Hypertension     Hypopotassemia     Lumbago     Osteopenia     PTSD (post-traumatic stress disorder)     Pulmonary embolism (HCC)     4 PE in bilat lungs    Spinal stenosis     congenital     Past Surgical History:   Procedure Laterality Date    DILATION AND CURETTAGE      Miscarriage    HYSTERECTOMY (CERVIX STATUS UNKNOWN)      MYOMECTOMY      SPINE SURGERY      cervical x 4, fusions and reconstruction    TONSILLECTOMY      age 3    UMBILICAL HERNIA REPAIR       OB History          1    Para   1    Term   1            AB        Living             SAB        IAB        Ectopic        Molar        Multiple        Live Births                  Family History   Problem Relation Age of Onset    Breast Cancer Maternal Aunt     Breast Cancer Maternal Cousin     Celiac Disease Neg Hx     Crohn's Disease Neg Hx      Social History     Socioeconomic History    Marital status:      Spouse name: Sara Zambrano    Number of children: 1    Years of education:  14    Highest education level: Associate degree: occupational, technical, or vocational program   Occupational History    Occupation: On disability     Comment: Used to work as a  and did odd jobs Tobacco Use    Smoking status: Every Day     Packs/day: 0.50     Years: 40.00     Pack years: 20.00     Types: Cigarettes    Smokeless tobacco: Never   Vaping Use    Vaping Use: Some days    Substances: THC, CBD    Devices: Disposable   Substance and Sexual Activity    Alcohol use: No    Drug use: Not Currently     Types: Marijuana Jonathan Loaiza)     Comment: medical    Sexual activity: Not Currently     Partners: Male   Other Topics Concern    Not on file   Social History Narrative    Lives at Home with her . [facility] in  [facility]      Had 1 son who is now  approximately 3 years ago (heroine addiction). She denies any addiction in herself or her . But she is on [address] Social Determinants of Health     Financial Resource Strain: Low Risk     Difficulty of Paying Living Expenses: Not hard at all   Food Insecurity: No Food Insecurity    Worried About Running Out of Food in the Last Year: Never true    Ran Out of Food in the Last Year: Never true   Transportation Needs: Not on file   Physical Activity: Not on file   Stress: Not on file   Social Connections: Not on file   Intimate Partner Violence: Not on file   Housing Stability: Not on file       Contraceptive method:  none    Patient's medications, allergies, past medical, surgical, social and family histories were reviewed and updated as appropriate. Review of Systems  As per chief complaint   All other systems reviewed and are negative. Physical Exam:  Vitals:  /88 (Site: Right Upper Arm, Position: Sitting, Cuff Size: Medium Adult)   Pulse 84   Ht 5' 3.5\" (1.613 m)   Wt 157 lb (71.2 kg)   BMI 27.38 kg/m²   Lungs: CTAB   Heart : Regular S1/S2, no M/R/G  Abdomen: Soft , NT, ND , + BS   Pelvic exam : deferred    Assessment:      Diagnosis Orders   1. Cyst of ovary, unspecified laterality  Ca 125      2.  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 ([facility]   Ca 125          Plan:     CT scan :   Incidental cystic   structure 4.6 cm in size on the right ovary in which 6-12 month follow-up is   recommended     Ca-125 ordered   Return after completing test     Orders Placed This Encounter   Procedures    Ca 125     Standing Status:   Future     Standing Expiration Date:   12/19/2023     Orders Placed This Encounter   Medications    estradiol (ESTRACE VAGINAL) 0.1 MG/GM vaginal cream     Sig: Place 1 g vaginally daily     Dispense:  1 each     Refill:  3       Follow Up:  Return in about 2 weeks (around 1/2/2023) for F/U for results.         Niurka Castillo MD

## 2022-12-27 DIAGNOSIS — N83.209 CYST OF OVARY, UNSPECIFIED LATERALITY: ICD-10-CM

## 2022-12-27 DIAGNOSIS — C57.7 MALIGNANT NEOPLASM OF OTHER SPECIFIED FEMALE GENITAL ORGANS (HCC): ICD-10-CM

## 2022-12-28 ENCOUNTER — TELEPHONE (OUTPATIENT)
Dept: FAMILY MEDICINE CLINIC | Age: 62
End: 2022-12-28

## 2022-12-28 LAB — CA 125: 8 U/ML

## 2023-01-04 ENCOUNTER — OFFICE VISIT (OUTPATIENT)
Dept: OBGYN CLINIC | Age: 63
End: 2023-01-04
Payer: MEDICARE

## 2023-01-04 VITALS
DIASTOLIC BLOOD PRESSURE: 88 MMHG | HEIGHT: 64 IN | HEART RATE: 92 BPM | WEIGHT: 156 LBS | SYSTOLIC BLOOD PRESSURE: 138 MMHG | BODY MASS INDEX: 26.63 KG/M2

## 2023-01-04 DIAGNOSIS — N83.209 CYST OF OVARY, UNSPECIFIED LATERALITY: Primary | ICD-10-CM

## 2023-01-04 PROCEDURE — 99214 OFFICE O/P EST MOD 30 MIN: CPT | Performed by: OBSTETRICS & GYNECOLOGY

## 2023-01-04 NOTE — PROGRESS NOTES
Francheska Ramirez is a 58 y.o. female who presents here today for complaints of Follow-up ( done 22.)    CT scan :   Incidental cystic   structure 4.6 cm in size on the right ovary in which 6-12 month follow-up is   recommended     Patient is asymptomatic.    .      Vitals:  /88 (Site: Left Upper Arm, Position: Sitting, Cuff Size: Medium Adult)   Pulse 92   Ht 5' 3.5\" (1.613 m)   Wt 156 lb (70.8 kg)   BMI 27.20 kg/m²   Allergies:  Latex, Fentanyl, Ketoprofen, Morphine, Nsaids, Tetracycline, Cefaclor, Cymbalta [duloxetine hcl], and Tetracyclines & related  Past Medical History:   Diagnosis Date    Anxiety     Arthritis     Depression     Drug effect     Hx of blood clots     Hyperlipidemia     Hypertension     Hypopotassemia     Lumbago     Osteopenia     PTSD (post-traumatic stress disorder)     Pulmonary embolism (HCC)     4 PE in bilat lungs    Spinal stenosis     congenital     Past Surgical History:   Procedure Laterality Date    DILATION AND CURETTAGE      Miscarriage    HYSTERECTOMY (CERVIX STATUS UNKNOWN)      MYOMECTOMY      SPINE SURGERY      cervical x 4, fusions and reconstruction    TONSILLECTOMY      age 3    UMBILICAL HERNIA REPAIR       OB History          1    Para   1    Term   1            AB        Living             SAB        IAB        Ectopic        Molar        Multiple        Live Births                  Family History   Problem Relation Age of Onset    Breast Cancer Maternal Aunt     Breast Cancer Maternal Cousin     Celiac Disease Neg Hx     Crohn's Disease Neg Hx      Social History     Socioeconomic History    Marital status:      Spouse name: Evaristo Pineda    Number of children: 1    Years of education:  14    Highest education level: Associate degree: occupational, technical, or vocational program   Occupational History    Occupation: On disability     Comment: Used to work as a  and did odd jobs   Tobacco Use    Smoking status: Every Day     Packs/day: 0.50     Years: 40.00     Pack years: 20.00     Types: Cigarettes    Smokeless tobacco: Never   Vaping Use    Vaping Use: Some days    Substances: THC, CBD    Devices: Disposable   Substance and Sexual Activity    Alcohol use: No    Drug use: Not Currently     Types: Marijuana Anthony Mansoor)     Comment: medical    Sexual activity: Not Currently     Partners: Male   Other Topics Concern    Not on file   Social History Narrative    Lives at Home with her . [facility] in  [address]     Had 1 son who is now  approximately 3 years ago (heroine addiction). She denies any addiction in herself or her . But she is on [address]. Social Determinants of Health     Financial Resource Strain: Low Risk     Difficulty of Paying Living Expenses: Not hard at all   Food Insecurity: No Food Insecurity    Worried About Running Out of Food in the Last Year: Never true    Ran Out of Food in the Last Year: Never true   Transportation Needs: Not on file   Physical Activity: Not on file   Stress: Not on file   Social Connections: Not on file   Intimate Partner Violence: Not on file   Housing Stability: Not on file       Contraceptive method:  none    Patient's medications, allergies, past medical, surgical, social and family histories were reviewed and updated as appropriate. Review of Systems  As per chief complaint   All other systems reviewed and are negative. Physical Exam:  Vitals:  /88 (Site: Left Upper Arm, Position: Sitting, Cuff Size: Medium Adult)   Pulse 92   Ht 5' 3.5\" (1.613 m)   Wt 156 lb (70.8 kg)   BMI 27.20 kg/m²   Lungs: CTAB   Heart : Regular S1/S2, no M/R/G  Abdomen: Soft , NT, ND , + BS   Pelvic exam : deferred    Assessment:      Diagnosis Orders   1.  Cyst of ovary, unspecified laterality            Plan:     CT scan :   Incidental cystic   structure 4.6 cm in size on the right ovary in which 6-12 month follow-up is recommended     Ca-125 - wnl   Discussed expectant managent vs excision . Patient opted for laparoscopic removal with possible bilateral oophorectomy. Counseling: The patient was counseled on all options both medical and surgical, conservative as well as definitive. She has elected to proceed with the procedure as stated above. The patient was counseled on the procedure. Risks and complications were reviewed in detail. The patients orders, labs, consents have been completed. The history and physical as well as all supporting surgical documentation will be forwarded to the pre-operative holding area. The patient is aware that this procedure may not alleviate her symptoms. That there may be a necessity for a second surgery and that there may be an incomplete removal of abnormal tissue. Discussed all risks and benefits of procedure in detail including risks of anesthesia, blood loss, need for transfusion, infection;  also potential for complication, injury, need for repair and/or removal of uterus, tubes, ovaries, bowel, bladder, ureters, blood vessels and nerves. Also discussed pre-operative and post-operative expectations. Patient verbalizes understanding. Patient was seen with total face to face time of 30 minutes. More than 50% of this visit was counseling and education regarding The encounter diagnosis was Cyst of ovary, unspecified laterality. and Follow-up ( done 12/27/22.)   as well as  counseling on preventative health maintenance follow-up. No orders of the defined types were placed in this encounter. No orders of the defined types were placed in this encounter. Follow Up:  Return for scheduled for surgery.         Raya Barroso MD

## 2023-01-05 ENCOUNTER — TELEMEDICINE (OUTPATIENT)
Dept: BEHAVIORAL/MENTAL HEALTH CLINIC | Age: 63
End: 2023-01-05
Payer: MEDICARE

## 2023-01-05 DIAGNOSIS — F33.1 MODERATE EPISODE OF RECURRENT MAJOR DEPRESSIVE DISORDER (HCC): Primary | ICD-10-CM

## 2023-01-05 DIAGNOSIS — R53.83 OTHER FATIGUE: ICD-10-CM

## 2023-01-05 PROCEDURE — 99215 OFFICE O/P EST HI 40 MIN: CPT | Performed by: PSYCHIATRY & NEUROLOGY

## 2023-01-05 RX ORDER — LITHIUM CARBONATE 150 MG/1
150 CAPSULE ORAL DAILY
Qty: 30 CAPSULE | Refills: 3 | Status: SHIPPED | OUTPATIENT
Start: 2023-01-05 | End: 2023-05-05
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
## 2023-01-13 ENCOUNTER — TELEPHONE (OUTPATIENT)
Dept: BEHAVIORAL/MENTAL HEALTH CLINIC | Age: 63
End: 2023-01-13

## 2023-01-13 NOTE — TELEPHONE ENCOUNTER
Patient called stopped Focalin at last visit, is going through a lot and wants something like xanax /something fast acting. Patient was informed that Dr. Kelly Galeano is out until 1/17/2023 and does not normally address any medication changes outside of an appointment. Patient made aware I would forward this message to .

## 2023-02-08 ENCOUNTER — TELEPHONE (OUTPATIENT)
Dept: BEHAVIORAL/MENTAL HEALTH CLINIC | Age: 63
End: 2023-02-08

## 2023-02-10 ENCOUNTER — TELEMEDICINE (OUTPATIENT)
Dept: BEHAVIORAL/MENTAL HEALTH CLINIC | Age: 63
End: 2023-02-10
Payer: MEDICARE

## 2023-02-10 DIAGNOSIS — G47.09 OTHER INSOMNIA: ICD-10-CM

## 2023-02-10 DIAGNOSIS — F43.10 PTSD (POST-TRAUMATIC STRESS DISORDER): Primary | ICD-10-CM

## 2023-02-10 DIAGNOSIS — F41.1 GAD (GENERALIZED ANXIETY DISORDER): ICD-10-CM

## 2023-02-10 PROCEDURE — 99215 OFFICE O/P EST HI 40 MIN: CPT | Performed by: PSYCHIATRY & NEUROLOGY

## 2023-02-10 RX ORDER — CLONIDINE HYDROCHLORIDE 0.1 MG/1
0.1 TABLET ORAL
Qty: 30 TABLET | Refills: 2 | Status: SHIPPED | OUTPATIENT
Start: 2023-02-10 | End: 2023-05-11
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
## 2023-03-02 ENCOUNTER — HOSPITAL ENCOUNTER (OUTPATIENT)
Dept: CT IMAGING | Age: 63
Discharge: HOME | End: 2023-03-04
Payer: MEDICARE

## 2023-03-02 DIAGNOSIS — Z12.2 SCREENING FOR LUNG CANCER: ICD-10-CM

## 2023-03-02 PROCEDURE — 71271 CT THORAX LUNG CANCER SCR C-: CPT

## 2023-03-06 ENCOUNTER — TELEMEDICINE (OUTPATIENT)
Dept: BEHAVIORAL/MENTAL HEALTH CLINIC | Age: 63
End: 2023-03-06
Payer: MEDICARE

## 2023-03-06 DIAGNOSIS — Z51.81 THERAPEUTIC DRUG MONITORING: ICD-10-CM

## 2023-03-06 DIAGNOSIS — F41.9 ANXIETY DISORDER, UNSPECIFIED TYPE: ICD-10-CM

## 2023-03-06 DIAGNOSIS — F43.10 PTSD (POST-TRAUMATIC STRESS DISORDER): ICD-10-CM

## 2023-03-06 DIAGNOSIS — F41.1 GAD (GENERALIZED ANXIETY DISORDER): Primary | ICD-10-CM

## 2023-03-06 PROCEDURE — 99215 OFFICE O/P EST HI 40 MIN: CPT | Performed by: PSYCHIATRY & NEUROLOGY

## 2023-03-06 RX ORDER — CLONIDINE HYDROCHLORIDE 0.1 MG/1
0.1 TABLET ORAL 2 TIMES DAILY
Qty: 60 TABLET | Refills: 5 | Status: SHIPPED | OUTPATIENT
Start: 2023-03-06 | End: 2023-09-02
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
## 2023-03-07 ENCOUNTER — TELEPHONE (OUTPATIENT)
Dept: FAMILY MEDICINE CLINIC | Age: 63
End: 2023-03-07

## 2023-03-07 DIAGNOSIS — M96.1 POSTLAMINECTOMY SYNDROME, CERVICAL REGION: ICD-10-CM

## 2023-03-07 DIAGNOSIS — G89.4 CHRONIC PAIN DISORDER: ICD-10-CM

## 2023-03-07 DIAGNOSIS — M54.17 LUMBOSACRAL RADICULOPATHY DUE TO TRAUMA: ICD-10-CM

## 2023-03-07 RX ORDER — HYDROCODONE BITARTRATE AND ACETAMINOPHEN 7.5; 325 MG/1; MG/1
1 TABLET ORAL EVERY 8 HOURS PRN
Qty: 90 TABLET | Refills: 0 | Status: SHIPPED | OUTPATIENT
Start: 2023-03-07 | End: 2023-04-25 | Stop reason: SDUPTHER

## 2023-03-07 NOTE — TELEPHONE ENCOUNTER
Comments:     Last Office Visit (last PCP visit):   10/31/2022    Next Visit Date:  Future Appointments   Date Time Provider Michelle Sena   3/22/2023  2:45 [facility] [place]   3/30/2023  3:30 PM Santana Fisher MD [facility]       **If hasn't been seen in over a year OR hasn't followed up according to last diabetes/ADHD visit, make appointment for patient before sending refill to provider.     Rx requested:  Requested Prescriptions      No prescriptions requested or ordered in this encounter

## 2023-03-07 NOTE — TELEPHONE ENCOUNTER
----- Message from Dotty Johnson LPN sent at 9/0/9196  9:40 AM EST -----  Subject: Refill Request    QUESTIONS  Name of Medication? HYDROcodone-acetaminophen (NORCO) 7.5-325 MG per   tablet  Patient-reported dosage and instructions? 1 three times a day   How many days do you have left? 12  Preferred Pharmacy? [facility] phone number (if available)? [phone]  ---------------------------------------------------------------------------  --------------  Guy CHAUDHRY  What is the best way for the office to contact you? OK to leave message on   voicemail  Preferred Call Back Phone Number? [phone]  ---------------------------------------------------------------------------  --------------  SCRIPT ANSWERS  Relationship to Patient?  Self

## 2023-03-14 DIAGNOSIS — E78.5 HYPERLIPIDEMIA, UNSPECIFIED HYPERLIPIDEMIA TYPE: ICD-10-CM

## 2023-03-14 RX ORDER — ATORVASTATIN CALCIUM 10 MG/1
TABLET, FILM COATED ORAL
Qty: 90 TABLET | Refills: 0 | Status: SHIPPED | OUTPATIENT
Start: 2023-03-14

## 2023-03-14 NOTE — TELEPHONE ENCOUNTER
Comments:     Last Office Visit (last PCP visit):   10/31/2022    Next Visit Date:  Future Appointments   Date Time Provider Michelle Sena   3/22/2023  2:45 PM RINA Garrett CNP [facility] AT [place]   3/30/2023  3:30 PM Guicho Soriano MD [facility]       **If hasn't been seen in over a year OR hasn't followed up according to last diabetes/ADHD visit, make appointment for patient before sending refill to provider.     Rx requested:  Requested Prescriptions     Pending Prescriptions Disp Refills    atorvastatin (LIPITOR) 10 MG tablet [Pharmacy Med Name: Atorvastatin Calcium 10 MG Oral Tablet] 90 tablet 0     Sig: Take 1 tablet by mouth once daily

## 2023-03-22 ENCOUNTER — OFFICE VISIT (OUTPATIENT)
Dept: FAMILY MEDICINE CLINIC | Age: 63
End: 2023-03-22
Payer: MEDICARE

## 2023-03-22 VITALS
OXYGEN SATURATION: 98 % | DIASTOLIC BLOOD PRESSURE: 86 MMHG | SYSTOLIC BLOOD PRESSURE: 118 MMHG | HEIGHT: 64 IN | WEIGHT: 156 LBS | HEART RATE: 91 BPM | BODY MASS INDEX: 26.63 KG/M2

## 2023-03-22 DIAGNOSIS — M47.12 CERVICAL SPONDYLOSIS WITH MYELOPATHY: ICD-10-CM

## 2023-03-22 DIAGNOSIS — M54.17 LUMBOSACRAL RADICULOPATHY DUE TO TRAUMA: ICD-10-CM

## 2023-03-22 DIAGNOSIS — Z79.899 HIGH RISK MEDICATION USE: ICD-10-CM

## 2023-03-22 DIAGNOSIS — R23.2 FACIAL FLUSHING: ICD-10-CM

## 2023-03-22 DIAGNOSIS — G95.9 CERVICAL MYELOPATHY (HCC): Primary | ICD-10-CM

## 2023-03-22 DIAGNOSIS — R53.83 OTHER FATIGUE: ICD-10-CM

## 2023-03-22 DIAGNOSIS — F11.29 OPIOID DEPENDENCE WITH OPIOID-INDUCED DISORDER (HCC): ICD-10-CM

## 2023-03-22 PROCEDURE — 99214 OFFICE O/P EST MOD 30 MIN: CPT | Performed by: NURSE PRACTITIONER

## 2023-03-22 ASSESSMENT — PATIENT HEALTH QUESTIONNAIRE - PHQ9
2. FEELING DOWN, DEPRESSED OR HOPELESS: 0
7. TROUBLE CONCENTRATING ON THINGS, SUCH AS READING THE NEWSPAPER OR WATCHING TELEVISION: 0
SUM OF ALL RESPONSES TO PHQ9 QUESTIONS 1 & 2: 0
3. TROUBLE FALLING OR STAYING ASLEEP: 0
10. IF YOU CHECKED OFF ANY PROBLEMS, HOW DIFFICULT HAVE THESE PROBLEMS MADE IT FOR YOU TO DO YOUR WORK, TAKE CARE OF THINGS AT HOME, OR GET ALONG WITH OTHER PEOPLE: 0
8. MOVING OR SPEAKING SO SLOWLY THAT OTHER PEOPLE COULD HAVE NOTICED. OR THE OPPOSITE, BEING SO FIGETY OR RESTLESS THAT YOU HAVE BEEN MOVING AROUND A LOT MORE THAN USUAL: 0
1. LITTLE INTEREST OR PLEASURE IN DOING THINGS: 0
6. FEELING BAD ABOUT YOURSELF - OR THAT YOU ARE A FAILURE OR HAVE LET YOURSELF OR YOUR FAMILY DOWN: 0
SUM OF ALL RESPONSES TO PHQ QUESTIONS 1-9: 0
9. THOUGHTS THAT YOU WOULD BE BETTER OFF DEAD, OR OF HURTING YOURSELF: 0
4. FEELING TIRED OR HAVING LITTLE ENERGY: 0
5. POOR APPETITE OR OVEREATING: 0
SUM OF ALL RESPONSES TO PHQ QUESTIONS 1-9: 0

## 2023-03-22 ASSESSMENT — ENCOUNTER SYMPTOMS
CONSTIPATION: 0
DIARRHEA: 0
SHORTNESS OF BREATH: 0
COUGH: 0

## 2023-03-22 NOTE — PROGRESS NOTES
JAY CAMARA     Had 1 son who is now  approximately 3 years ago (heroine addiction). She denies any addiction in herself or her . But she is on [address] Social Determinants of Health     Financial Resource Strain: Low Risk     Difficulty of Paying Living Expenses: Not hard at all   Food Insecurity: No Food Insecurity    Worried About [facility] in the Last Year: Never true    Ran Out of Food in the Last Year: Never true     Current Outpatient Medications on File Prior to Visit   Medication Sig Dispense Refill    atorvastatin (LIPITOR) 10 MG tablet Take 1 tablet by mouth once daily 90 tablet 0    HYDROcodone-acetaminophen (NORCO) 7.5-325 MG per tablet Take 1 tablet by mouth every 8 hours as needed for Pain for up to 30 days. Intended supply: 30 days 90 tablet 0    cloNIDine (CATAPRES) 0.1 MG tablet Take 1 tablet by mouth 2 times daily 60 tablet 5    lithium 150 MG capsule Take 1 capsule by mouth daily 30 capsule 3    omeprazole (PRILOSEC) 20 MG delayed release capsule       estradiol (ESTRACE VAGINAL) 0.1 MG/GM vaginal cream Place 1 g vaginally daily 1 each 3    amitriptyline (ELAVIL) 150 MG tablet Take 1 tablet by mouth nightly 90 tablet 1    carvedilol (COREG) 12.5 MG tablet Take 1 tablet by mouth twice daily 180 tablet 3    Multiple Vitamin (MULTI-VITAMIN DAILY PO) Take by mouth      topiramate (TOPAMAX) 100 MG tablet Take 1 tablet by mouth 2 times daily 180 tablet 1     No current facility-administered medications on file prior to visit. Allergies   Allergen Reactions    Latex Anaphylaxis     Rash    Fentanyl Rash     At site of application    Ketoprofen      Rash    Morphine Other (See Comments)     GI upset  Other reaction(s): GI Upset  GI upset    Nsaids     Tetracycline      Rash    Cefaclor Rash     Rash    Cymbalta [Duloxetine Hcl] Nausea And Vomiting    Tetracyclines & Related Rash       Review of Systems   Constitutional:  Negative for fatigue.    Respiratory:

## 2023-03-25 LAB
AMPHETAMINES UR QL: NEGATIVE NG/ML
BARBITURATES UR QL: NEGATIVE NG/ML
BENZODIAZ UR QL: NEGATIVE NG/ML
CANNABINOIDS UR QL SCN: POSITIVE NG/ML
COCAINE UR QL: NEGATIVE NG/ML
CREAT UR-MCNC: <20 MG/DL (ref 20–400)
ETHANOL UR QL: NEGATIVE MG/DL
Lab: ABNORMAL
MDMA CTO UR SCN-MCNC: NEGATIVE NG/ML
METHADONE UR QL: NEGATIVE NG/ML
OPIATES UR QL: POSITIVE NG/ML
OXYCODONE CTO UR SCN-MCNC: NEGATIVE NG/ML
PCP UR QL SCN: NEGATIVE NG/ML
PROPOXYPH UR QL: NEGATIVE NG/ML

## 2023-03-26 LAB — NUCLEAR IGG SER QL IA: NORMAL

## 2023-04-04 ENCOUNTER — OFFICE VISIT (OUTPATIENT)
Dept: FAMILY MEDICINE CLINIC | Age: 63
End: 2023-04-04
Payer: MEDICARE

## 2023-04-04 VITALS
SYSTOLIC BLOOD PRESSURE: 124 MMHG | WEIGHT: 155 LBS | BODY MASS INDEX: 27.46 KG/M2 | OXYGEN SATURATION: 98 % | HEIGHT: 63 IN | DIASTOLIC BLOOD PRESSURE: 72 MMHG | HEART RATE: 86 BPM

## 2023-04-04 DIAGNOSIS — R10.32 LLQ PAIN: Primary | ICD-10-CM

## 2023-04-04 LAB
BILIRUBIN, POC: NORMAL
BLOOD URINE, POC: NORMAL
CLARITY, POC: CLEAR
COLOR, POC: YELLOW
GLUCOSE URINE, POC: NORMAL
KETONES, POC: NORMAL
LEUKOCYTE EST, POC: NORMAL
NITRITE, POC: NORMAL
PH, POC: 6
PROTEIN, POC: NORMAL
SPECIFIC GRAVITY, POC: 1.01
UROBILINOGEN, POC: NORMAL

## 2023-04-04 PROCEDURE — 81002 URINALYSIS NONAUTO W/O SCOPE: CPT | Performed by: NURSE PRACTITIONER

## 2023-04-04 PROCEDURE — 99214 OFFICE O/P EST MOD 30 MIN: CPT | Performed by: NURSE PRACTITIONER

## 2023-04-04 ASSESSMENT — ENCOUNTER SYMPTOMS
SHORTNESS OF BREATH: 0
ABDOMINAL PAIN: 1
ABDOMINAL DISTENTION: 1

## 2023-04-04 NOTE — PROGRESS NOTES
Answer:   Radiologist Recommendation     Order Specific Question:   STAT Creatinine as needed:     Answer:   Yes     Order Specific Question:   Reason for exam:     Answer:   LLQ pain, change in bowels    POCT Urinalysis no Micro     UA negative. R/o diverticulitis. Side effects, adverse effects of the medication prescribed today, as well as treatment plan/ rationale and result expectations have been discussed with the patient who expresses understanding and desires to proceed. Close follow up to evaluate treatment results and for coordination of care. I have reviewed the patient's medical history in detail and updated the computerized patient record. As always, patient is advised that if symptoms worsen in any way, especially if experiencing fever and chills, they will proceed to the nearest emergency room.         Freddy Gómez, RINA - CNP

## 2023-04-05 ENCOUNTER — HOSPITAL ENCOUNTER (OUTPATIENT)
Dept: CT IMAGING | Age: 63
Discharge: HOME | End: 2023-04-07
Payer: MEDICARE

## 2023-04-05 DIAGNOSIS — K52.9 COLITIS: Primary | ICD-10-CM

## 2023-04-05 DIAGNOSIS — R10.32 LLQ PAIN: ICD-10-CM

## 2023-04-05 LAB
PERFORMED ON: NORMAL
POC CREATININE: 0.8 MG/DL (ref 0.6–1.2)
POC SAMPLE TYPE: NORMAL

## 2023-04-05 PROCEDURE — 6360000004 HC RX CONTRAST MEDICATION: Performed by: NURSE PRACTITIONER

## 2023-04-05 PROCEDURE — 74177 CT ABD & PELVIS W/CONTRAST: CPT

## 2023-04-05 RX ORDER — CIPROFLOXACIN 500 MG/1
500 TABLET, FILM COATED ORAL 2 TIMES DAILY
Qty: 20 TABLET | Refills: 0 | Status: SHIPPED | OUTPATIENT
Start: 2023-04-05 | End: 2023-05-22 | Stop reason: SDUPTHER

## 2023-04-05 RX ORDER — METRONIDAZOLE 500 MG/1
500 TABLET ORAL 3 TIMES DAILY
Qty: 30 TABLET | Refills: 0 | Status: SHIPPED | OUTPATIENT
Start: 2023-04-05 | End: 2023-05-22 | Stop reason: SDUPTHER

## 2023-04-05 RX ADMIN — IOPAMIDOL 50 ML: 612 INJECTION, SOLUTION INTRAVENOUS at 15:22

## 2023-04-05 RX ADMIN — IOPAMIDOL 20 ML: 612 INJECTION, SOLUTION INTRAVENOUS at 15:22

## 2023-04-17 ENCOUNTER — TELEPHONE (OUTPATIENT)
Dept: OBGYN CLINIC | Age: 63
End: 2023-04-17

## 2023-04-25 ENCOUNTER — PATIENT MESSAGE (OUTPATIENT)
Dept: FAMILY MEDICINE CLINIC | Age: 63
End: 2023-04-25

## 2023-04-25 DIAGNOSIS — M96.1 POSTLAMINECTOMY SYNDROME, CERVICAL REGION: ICD-10-CM

## 2023-04-25 DIAGNOSIS — M54.17 LUMBOSACRAL RADICULOPATHY DUE TO TRAUMA: ICD-10-CM

## 2023-04-25 DIAGNOSIS — G89.4 CHRONIC PAIN DISORDER: ICD-10-CM

## 2023-04-25 RX ORDER — HYDROCODONE BITARTRATE AND ACETAMINOPHEN 7.5; 325 MG/1; MG/1
1 TABLET ORAL EVERY 8 HOURS PRN
Qty: 90 TABLET | Refills: 0 | Status: SHIPPED | OUTPATIENT
Start: 2023-04-25 | End: 2023-05-25

## 2023-04-25 NOTE — TELEPHONE ENCOUNTER
Comments: med pended    Last Office Visit (last PCP visit):   4/4/2023    Next Visit Date:  Future Appointments   Date Time Provider Michelle Sena   6/20/2023  2:45 PM RINA Diaz CNP [facility]       **If hasn't been seen in over a year OR hasn't followed up according to last diabetes/ADHD visit, make appointment for patient before sending refill to provider. Rx requested:  Requested Prescriptions     Pending Prescriptions Disp Refills    HYDROcodone-acetaminophen (NORCO) 7.5-325 MG per tablet 90 tablet 0     Sig: Take 1 tablet by mouth every 8 hours as needed for Pain for up to 30 days.  Intended supply: 30 days

## 2023-04-25 NOTE — TELEPHONE ENCOUNTER
From: Jj Watson  To: Stefan Johnson  Sent: 4/25/2023 12:51 PM EDT  Subject: [address] REFILL    Hi Josh Isarel. Hope everything is good with you. Can you please send a refill in for my [place] to [facility] on [facility]. Also I finished the antibiotics for the diverticulitis which made me feel pretty [employer] while I was taking them but I'm feeling so much better now. I'll get the blood work done that  ordered before I see you for my next follow up appointment. Thanks again for all you do. Have a good day. Take care.  Tyrell Quintanilla

## 2023-05-22 ENCOUNTER — OFFICE VISIT (OUTPATIENT)
Dept: FAMILY MEDICINE CLINIC | Age: 63
End: 2023-05-22
Payer: MEDICARE

## 2023-05-22 VITALS
DIASTOLIC BLOOD PRESSURE: 80 MMHG | HEART RATE: 116 BPM | BODY MASS INDEX: 27.46 KG/M2 | SYSTOLIC BLOOD PRESSURE: 112 MMHG | WEIGHT: 155 LBS | OXYGEN SATURATION: 99 % | HEIGHT: 63 IN

## 2023-05-22 DIAGNOSIS — K52.9 COLITIS: ICD-10-CM

## 2023-05-22 DIAGNOSIS — F32.A ANXIETY AND DEPRESSION: Primary | ICD-10-CM

## 2023-05-22 DIAGNOSIS — F41.9 ANXIETY AND DEPRESSION: Primary | ICD-10-CM

## 2023-05-22 PROCEDURE — 99213 OFFICE O/P EST LOW 20 MIN: CPT | Performed by: NURSE PRACTITIONER

## 2023-05-22 RX ORDER — CIPROFLOXACIN 500 MG/1
500 TABLET, FILM COATED ORAL 2 TIMES DAILY
Qty: 20 TABLET | Refills: 0 | Status: SHIPPED | OUTPATIENT
Start: 2023-05-22 | End: 2023-06-01

## 2023-05-22 RX ORDER — LITHIUM CARBONATE 150 MG/1
150 CAPSULE ORAL DAILY
Qty: 30 CAPSULE | Refills: 2 | Status: SHIPPED | OUTPATIENT
Start: 2023-05-22 | End: 2023-09-19

## 2023-05-22 RX ORDER — METRONIDAZOLE 500 MG/1
500 TABLET ORAL 3 TIMES DAILY
Qty: 30 TABLET | Refills: 0 | Status: SHIPPED | OUTPATIENT
Start: 2023-05-22 | End: 2023-06-01

## 2023-05-22 ASSESSMENT — ENCOUNTER SYMPTOMS
ABDOMINAL DISTENTION: 1
COUGH: 0
ABDOMINAL PAIN: 1
DIARRHEA: 0
CONSTIPATION: 0
SHORTNESS OF BREATH: 0
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
## 2023-06-28 ENCOUNTER — OFFICE VISIT (OUTPATIENT)
Dept: FAMILY MEDICINE CLINIC | Age: 63
End: 2023-06-28
Payer: MEDICARE

## 2023-06-28 VITALS
WEIGHT: 153 LBS | SYSTOLIC BLOOD PRESSURE: 136 MMHG | HEIGHT: 63 IN | BODY MASS INDEX: 27.11 KG/M2 | DIASTOLIC BLOOD PRESSURE: 82 MMHG | HEART RATE: 108 BPM | OXYGEN SATURATION: 97 % | TEMPERATURE: 98.6 F

## 2023-06-28 DIAGNOSIS — R73.03 BORDERLINE DIABETES: ICD-10-CM

## 2023-06-28 DIAGNOSIS — Z51.81 THERAPEUTIC DRUG MONITORING: ICD-10-CM

## 2023-06-28 DIAGNOSIS — F41.9 ANXIETY DISORDER, UNSPECIFIED TYPE: ICD-10-CM

## 2023-06-28 DIAGNOSIS — R53.83 OTHER FATIGUE: ICD-10-CM

## 2023-06-28 DIAGNOSIS — Z00.00 MEDICARE ANNUAL WELLNESS VISIT, SUBSEQUENT: Primary | ICD-10-CM

## 2023-06-28 LAB
ALBUMIN SERPL-MCNC: 4.3 G/DL (ref 3.5–4.6)
ALP SERPL-CCNC: 156 U/L (ref 40–130)
ALT SERPL-CCNC: 31 U/L (ref 0–33)
ANION GAP SERPL CALCULATED.3IONS-SCNC: 12 MEQ/L (ref 9–15)
AST SERPL-CCNC: 30 U/L (ref 0–35)
BASOPHILS # BLD: 0.1 K/UL (ref 0–0.2)
BASOPHILS NFR BLD: 0.7 %
BILIRUB SERPL-MCNC: 0.3 MG/DL (ref 0.2–0.7)
BUN SERPL-MCNC: 10 MG/DL (ref 8–23)
CALCIUM SERPL-MCNC: 10.4 MG/DL (ref 8.5–9.9)
CHLORIDE SERPL-SCNC: 104 MEQ/L (ref 95–107)
CO2 SERPL-SCNC: 22 MEQ/L (ref 20–31)
CREAT SERPL-MCNC: 0.74 MG/DL (ref 0.5–0.9)
EOSINOPHIL # BLD: 0.3 K/UL (ref 0–0.7)
EOSINOPHIL NFR BLD: 1.8 %
ERYTHROCYTE [DISTWIDTH] IN BLOOD BY AUTOMATED COUNT: 14.1 % (ref 11.5–14.5)
GLOBULIN SER CALC-MCNC: 3.5 G/DL (ref 2.3–3.5)
GLUCOSE SERPL-MCNC: 85 MG/DL (ref 70–99)
HBA1C MFR BLD: 6 % (ref 4.8–5.9)
HCT VFR BLD AUTO: 46.1 % (ref 37–47)
HGB BLD-MCNC: 15.5 G/DL (ref 12–16)
LITHIUM SERPL-MCNC: 0.1 MEQ/L (ref 0.6–1.2)
LYMPHOCYTES # BLD: 4.7 K/UL (ref 1–4.8)
LYMPHOCYTES NFR BLD: 32.7 %
MCH RBC QN AUTO: 30.7 PG (ref 27–31.3)
MCHC RBC AUTO-ENTMCNC: 33.6 % (ref 33–37)
MCV RBC AUTO: 91.5 FL (ref 79.4–94.8)
MONOCYTES # BLD: 0.8 K/UL (ref 0.2–0.8)
MONOCYTES NFR BLD: 5.7 %
NEUTROPHILS # BLD: 8.5 K/UL (ref 1.4–6.5)
NEUTS SEG NFR BLD: 59.1 %
PLATELET # BLD AUTO: 375 K/UL (ref 130–400)
POTASSIUM SERPL-SCNC: 4.4 MEQ/L (ref 3.4–4.9)
PROT SERPL-MCNC: 7.8 G/DL (ref 6.3–8)
RBC # BLD AUTO: 5.04 M/UL (ref 4.2–5.4)
SODIUM SERPL-SCNC: 138 MEQ/L (ref 135–144)
T4 FREE SERPL-MCNC: 1.08 NG/DL (ref 0.84–1.68)
TSH REFLEX: 1.78 UIU/ML (ref 0.44–3.86)
WBC # BLD AUTO: 14.4 K/UL (ref 4.8–10.8)

## 2023-06-28 PROCEDURE — G0439 PPPS, SUBSEQ VISIT: HCPCS | Performed by: NURSE PRACTITIONER

## 2023-06-28 ASSESSMENT — PATIENT HEALTH QUESTIONNAIRE - PHQ9
7. TROUBLE CONCENTRATING ON THINGS, SUCH AS READING THE NEWSPAPER OR WATCHING TELEVISION: 0
9. THOUGHTS THAT YOU WOULD BE BETTER OFF DEAD, OR OF HURTING YOURSELF: 0
5. POOR APPETITE OR OVEREATING: 0
SUM OF ALL RESPONSES TO PHQ QUESTIONS 1-9: 3
SUM OF ALL RESPONSES TO PHQ QUESTIONS 1-9: 3
10. IF YOU CHECKED OFF ANY PROBLEMS, HOW DIFFICULT HAVE THESE PROBLEMS MADE IT FOR YOU TO DO YOUR WORK, TAKE CARE OF THINGS AT HOME, OR GET ALONG WITH OTHER PEOPLE: 1
3. TROUBLE FALLING OR STAYING ASLEEP: 0
SUM OF ALL RESPONSES TO PHQ9 QUESTIONS 1 & 2: 3
8. MOVING OR SPEAKING SO SLOWLY THAT OTHER PEOPLE COULD HAVE NOTICED. OR THE OPPOSITE, BEING SO FIGETY OR RESTLESS THAT YOU HAVE BEEN MOVING AROUND A LOT MORE THAN USUAL: 0
SUM OF ALL RESPONSES TO PHQ QUESTIONS 1-9: 3
4. FEELING TIRED OR HAVING LITTLE ENERGY: 0
SUM OF ALL RESPONSES TO PHQ QUESTIONS 1-9: 3
2. FEELING DOWN, DEPRESSED OR HOPELESS: 3
6. FEELING BAD ABOUT YOURSELF - OR THAT YOU ARE A FAILURE OR HAVE LET YOURSELF OR YOUR FAMILY DOWN: 0
1. LITTLE INTEREST OR PLEASURE IN DOING THINGS: 0

## 2023-06-28 ASSESSMENT — LIFESTYLE VARIABLES
HOW MANY STANDARD DRINKS CONTAINING ALCOHOL DO YOU HAVE ON A TYPICAL DAY: PATIENT DOES NOT DRINK
HOW OFTEN DO YOU HAVE A DRINK CONTAINING ALCOHOL: NEVER

## 2023-07-01 LAB
MISCELLANEOUS LAB TEST ORDER: NORMAL
WHOPPER PROMPT: NORMAL

## 2023-08-31 NOTE — ED NOTES
Labs drawn, labeled and sent by lab, Pt garcia Hartman RN  03/13/18 1116
Urine labeled and sent to lab     Alexis Theodore RN  03/13/18 3857
no

## 2023-09-11 DIAGNOSIS — M54.12 BRACHIAL NEURITIS OR RADICULITIS: ICD-10-CM

## 2023-09-11 DIAGNOSIS — M54.17 LUMBOSACRAL RADICULOPATHY DUE TO TRAUMA: ICD-10-CM

## 2023-09-11 DIAGNOSIS — M96.1 POSTLAMINECTOMY SYNDROME, CERVICAL REGION: ICD-10-CM

## 2023-09-11 DIAGNOSIS — G89.4 CHRONIC PAIN DISORDER: ICD-10-CM

## 2023-09-11 NOTE — TELEPHONE ENCOUNTER
----- Message from Nehemiah Valdez sent at 9/11/2023 10:52 AM EDT -----  Regarding: Refills   Contact: [phone]  Melanie Etienne. Hope all is good with you. Although I still have some of my Norco I would like you to call in a refill for me as the last two times you did it was on back order and I ran out. I also need a refill on my Topamax 100 mg and Amitriptyline 150 mg both of which I get a 90-day supply for. I get all of these at [facility] in [place]. Thanks again and I will see you at my follow-up appointment. Take care.  Kelly Brady

## 2023-09-12 RX ORDER — AMITRIPTYLINE HYDROCHLORIDE 150 MG/1
150 TABLET ORAL NIGHTLY
Qty: 90 TABLET | Refills: 1 | Status: SHIPPED | OUTPATIENT
Start: 2023-09-12

## 2023-09-12 RX ORDER — TOPIRAMATE 100 MG/1
100 TABLET, FILM COATED ORAL 2 TIMES DAILY
Qty: 180 TABLET | Refills: 1 | Status: SHIPPED | OUTPATIENT
Start: 2023-09-12 | End: 2024-03-10

## 2023-09-12 RX ORDER — HYDROCODONE BITARTRATE AND ACETAMINOPHEN 7.5; 325 MG/1; MG/1
1 TABLET ORAL EVERY 8 HOURS PRN
Qty: 90 TABLET | Refills: 0 | Status: SHIPPED | OUTPATIENT
Start: 2023-09-12 | End: 2023-10-12

## 2023-09-23 ENCOUNTER — PATIENT MESSAGE (OUTPATIENT)
Dept: FAMILY MEDICINE CLINIC | Age: 63
End: 2023-09-23

## 2023-09-25 DIAGNOSIS — I10 ESSENTIAL HYPERTENSION: ICD-10-CM

## 2023-09-25 DIAGNOSIS — R00.0 TACHYCARDIA: ICD-10-CM

## 2023-09-25 DIAGNOSIS — E78.5 HYPERLIPIDEMIA, UNSPECIFIED HYPERLIPIDEMIA TYPE: ICD-10-CM

## 2023-09-25 RX ORDER — ATORVASTATIN CALCIUM 10 MG/1
10 TABLET, FILM COATED ORAL DAILY
Qty: 90 TABLET | Refills: 0 | Status: SHIPPED | OUTPATIENT
Start: 2023-09-25

## 2023-09-25 RX ORDER — CARVEDILOL 12.5 MG/1
TABLET ORAL
Qty: 180 TABLET | Refills: 3 | Status: SHIPPED | OUTPATIENT
Start: 2023-09-25

## 2023-09-25 NOTE — TELEPHONE ENCOUNTER
----- Message from Nehemiah Valdez sent at 9/23/2023 10:56 AM EDT -----  Regarding: Refills  Contact: [phone]  Trudi Day. I have an appointment with you on Wednesday however I need a refill on my Atorvastatin 10 mg and Carvedilol 12.5 mg as I will be out of both of those before that. I get a 90-day supply of each of those and get them from [facility] on [facility] in [place]. Thanks again and I will see you Wednesday. Take care.  [email or web address]

## 2023-09-25 NOTE — TELEPHONE ENCOUNTER
From: Isabel Lr  To: Mikel Hiren  Sent: 9/23/2023 10:56 AM EDT  Subject: Anjana Mary Downs. I have an appointment with you on Wednesday however I need a refill on my Atorvastatin 10 mg and Carvedilol 12.5 mg as I will be out of both of those before that. I get a 90-day supply of each of those and get them from [facility] on [address] in [place]. Thanks again and I will see you Wednesday. Take care.  Fernando Side

## 2023-09-28 ENCOUNTER — OFFICE VISIT (OUTPATIENT)
Dept: FAMILY MEDICINE CLINIC | Age: 63
End: 2023-09-28
Payer: MEDICARE

## 2023-09-28 VITALS
WEIGHT: 153 LBS | SYSTOLIC BLOOD PRESSURE: 124 MMHG | BODY MASS INDEX: 27.11 KG/M2 | HEART RATE: 63 BPM | DIASTOLIC BLOOD PRESSURE: 84 MMHG | HEIGHT: 63 IN | OXYGEN SATURATION: 100 %

## 2023-09-28 DIAGNOSIS — R73.9 HYPERGLYCEMIA: ICD-10-CM

## 2023-09-28 DIAGNOSIS — K59.03 DRUG-INDUCED CONSTIPATION: Primary | ICD-10-CM

## 2023-09-28 DIAGNOSIS — E78.5 HYPERLIPIDEMIA, UNSPECIFIED HYPERLIPIDEMIA TYPE: ICD-10-CM

## 2023-09-28 DIAGNOSIS — F33.2 MAJOR DEPRESSIVE DISORDER, RECURRENT SEVERE WITHOUT PSYCHOTIC FEATURES (HCC): ICD-10-CM

## 2023-09-28 DIAGNOSIS — I10 ESSENTIAL HYPERTENSION: ICD-10-CM

## 2023-09-28 PROCEDURE — 3079F DIAST BP 80-89 MM HG: CPT | Performed by: NURSE PRACTITIONER

## 2023-09-28 PROCEDURE — 3074F SYST BP LT 130 MM HG: CPT | Performed by: NURSE PRACTITIONER

## 2023-09-28 PROCEDURE — 99214 OFFICE O/P EST MOD 30 MIN: CPT | Performed by: NURSE PRACTITIONER

## 2023-09-28 RX ORDER — CLONIDINE HYDROCHLORIDE 0.3 MG/1
TABLET ORAL
COMMUNITY
Start: 2023-09-22

## 2023-09-28 RX ORDER — LUBIPROSTONE 8 UG/1
8 CAPSULE ORAL DAILY
Qty: 30 CAPSULE | Refills: 3 | Status: CANCELLED | OUTPATIENT
Start: 2023-09-28

## 2023-09-28 RX ORDER — LUBIPROSTONE 24 UG/1
24 CAPSULE ORAL 2 TIMES DAILY WITH MEALS
Qty: 60 CAPSULE | Refills: 3 | Status: SHIPPED | OUTPATIENT
Start: 2023-09-28

## 2023-09-28 ASSESSMENT — ENCOUNTER SYMPTOMS
DIARRHEA: 0
ABDOMINAL PAIN: 1
CONSTIPATION: 1
COUGH: 0
SHORTNESS OF BREATH: 0

## 2023-09-28 NOTE — PROGRESS NOTES
Pupils: Pupils are equal, round, and reactive to light. Cardiovascular:      Rate and Rhythm: Normal rate and regular rhythm. Pulses: Normal pulses. Heart sounds: Normal heart sounds. Pulmonary:      Effort: Pulmonary effort is normal.      Breath sounds: Normal breath sounds. Musculoskeletal:      Cervical back: Neck supple. Skin:     General: Skin is warm. Neurological:      General: No focal deficit present. Mental Status: She is alert and oriented to person, place, and time. Mental status is at baseline. Psychiatric:         Mood and Affect: Mood normal.         Behavior: Behavior normal.         Thought Content: Thought content normal.         Judgment: Judgment normal.         Assessment & Plan     Diagnosis Orders   1. Drug-induced constipation  lubiprostone (AMITIZA) 24 MCG capsule      2. Essential hypertension  Well controlled/stable. 3. Hyperlipidemia, unspecified hyperlipidemia type  Stable. 4. Hyperglycemia        5. Major depressive disorder, recurrent severe without psychotic features ([address])  Stable. Follow up with psychiatry and psychology. No orders of the defined types were placed in this encounter. Orders Placed This Encounter   Medications    lubiprostone (AMITIZA) 24 MCG capsule     Sig: Take 1 capsule by mouth 2 times daily (with meals)     Dispense:  60 capsule     Refill:  3       Medications Discontinued During This Encounter   Medication Reason    cloNIDine (CATAPRES) 0.1 MG tablet     lithium 150 MG capsule       Side effects, adverse effects of the medication prescribed today, as well as treatment plan/ rationale and result expectations have been discussed with the patient who expresses understanding and desires to proceed. Close follow up to evaluate treatment results and for coordination of care. I have reviewed the patient's medical history in detail and updated the computerized patient record.     As always, patient is advised

## 2023-10-26 DIAGNOSIS — M54.17 LUMBOSACRAL RADICULOPATHY DUE TO TRAUMA: ICD-10-CM

## 2023-10-26 DIAGNOSIS — M96.1 POSTLAMINECTOMY SYNDROME, CERVICAL REGION: ICD-10-CM

## 2023-10-26 DIAGNOSIS — G89.4 CHRONIC PAIN DISORDER: ICD-10-CM

## 2023-10-26 RX ORDER — HYDROCODONE BITARTRATE AND ACETAMINOPHEN 7.5; 325 MG/1; MG/1
1 TABLET ORAL EVERY 8 HOURS PRN
Qty: 90 TABLET | Refills: 0 | Status: SHIPPED | OUTPATIENT
Start: 2023-10-26 | End: 2023-11-25

## 2023-10-26 NOTE — TELEPHONE ENCOUNTER
----- Message from Nehemiah Valdez sent at 10/26/2023  2:29 PM EDT -----  Regarding: Robert daniels/Neurologist   Contact: [phone]  Ant Colon. Hope all is good with you. Please send a refill for my [place] to [facility] on [address] in [place]. Additionally I've been experiencing some some symptoms related to my spinal cord injury that I haven't had in quite a while. I'm hoping this is a temporary flare up. If not I may need a referral to a neurologist as the neurologist I was seeing for many years has since passed away. I'll reach out to you if that is the case. Thanks again for all you do.  Layla Ambriz

## 2023-10-30 DIAGNOSIS — G89.4 CHRONIC PAIN DISORDER: ICD-10-CM

## 2023-10-30 DIAGNOSIS — M96.1 POSTLAMINECTOMY SYNDROME, CERVICAL REGION: ICD-10-CM

## 2023-10-30 DIAGNOSIS — M54.17 LUMBOSACRAL RADICULOPATHY DUE TO TRAUMA: ICD-10-CM

## 2023-10-30 RX ORDER — HYDROCODONE BITARTRATE AND ACETAMINOPHEN 7.5; 325 MG/1; MG/1
1 TABLET ORAL EVERY 8 HOURS PRN
Qty: 90 TABLET | Refills: 0 | OUTPATIENT
Start: 2023-10-30 | End: 2023-11-29

## 2023-10-30 NOTE — TELEPHONE ENCOUNTER
----- Message from Nehemiah Valdez sent at 10/26/2023  2:29 PM EDT -----  Regarding: Modesta Enrique refill/Neurologist   Contact: [phone]  Camden Puckett. Hope all is good with you. Please send a refill for my [place] to [facility] on [facility] in [place]. Additionally I've been experiencing some some symptoms related to my spinal cord injury that I haven't had in quite a while. I'm hoping this is a temporary flare up. If not I may need a referral to a neurologist as the neurologist I was seeing for many years has since passed away. I'll reach out to you if that is the case. Thanks again for all you do.  Zuleyka Pinzon

## 2024-01-15 DIAGNOSIS — G89.4 CHRONIC PAIN DISORDER: ICD-10-CM

## 2024-01-15 DIAGNOSIS — M54.17 LUMBOSACRAL RADICULOPATHY DUE TO TRAUMA: ICD-10-CM

## 2024-01-15 DIAGNOSIS — M96.1 POSTLAMINECTOMY SYNDROME, CERVICAL REGION: ICD-10-CM

## 2024-01-15 RX ORDER — HYDROCODONE BITARTRATE AND ACETAMINOPHEN 7.5; 325 MG/1; MG/1
1 TABLET ORAL EVERY 8 HOURS PRN
Qty: 90 TABLET | Refills: 0 | Status: SHIPPED | OUTPATIENT
Start: 2024-01-15 | End: 2024-01-15 | Stop reason: SDUPTHER

## 2024-01-15 NOTE — TELEPHONE ENCOUNTER
----- Message from Natalie Banks sent at 1/12/2024 11:53 AM EST -----  Regarding: Neurologist referral and pain medication  Contact: [phone]  Thanks Carolina. Please let her know I also need a refill on my Norco. Take care. Natalie Banks

## 2024-01-25 DIAGNOSIS — Z13.220 LIPID SCREENING: ICD-10-CM

## 2024-01-25 DIAGNOSIS — R25.2 LEG CRAMPING: ICD-10-CM

## 2024-01-25 DIAGNOSIS — R29.898 WEAKNESS OF LOWER EXTREMITY, UNSPECIFIED LATERALITY: ICD-10-CM

## 2024-01-25 LAB
ALBUMIN SERPL-MCNC: 4.5 G/DL (ref 3.5–4.6)
ALP SERPL-CCNC: 138 U/L (ref 40–130)
ALT SERPL-CCNC: 39 U/L (ref 0–33)
ANION GAP SERPL CALCULATED.3IONS-SCNC: 15 MEQ/L (ref 9–15)
AST SERPL-CCNC: 37 U/L (ref 0–35)
BASOPHILS # BLD: 0.1 K/UL (ref 0–0.2)
BASOPHILS NFR BLD: 0.9 %
BILIRUB SERPL-MCNC: 0.3 MG/DL (ref 0.2–0.7)
BUN SERPL-MCNC: 8 MG/DL (ref 8–23)
CALCIUM SERPL-MCNC: 9.9 MG/DL (ref 8.5–9.9)
CHLORIDE SERPL-SCNC: 104 MEQ/L (ref 95–107)
CHOLEST SERPL-MCNC: 223 MG/DL (ref 0–199)
CO2 SERPL-SCNC: 21 MEQ/L (ref 20–31)
CREAT SERPL-MCNC: 0.72 MG/DL (ref 0.5–0.9)
EOSINOPHIL # BLD: 0.4 K/UL (ref 0–0.7)
EOSINOPHIL NFR BLD: 4 %
ERYTHROCYTE [DISTWIDTH] IN BLOOD BY AUTOMATED COUNT: 13.4 % (ref 11.5–14.5)
GLOBULIN SER CALC-MCNC: 3.1 G/DL (ref 2.3–3.5)
GLUCOSE SERPL-MCNC: 127 MG/DL (ref 70–99)
HCT VFR BLD AUTO: 47.2 % (ref 37–47)
HDLC SERPL-MCNC: 40 MG/DL (ref 40–59)
HGB BLD-MCNC: 16.2 G/DL (ref 12–16)
LDLC SERPL CALC-MCNC: 126 MG/DL (ref 0–129)
LYMPHOCYTES # BLD: 4 K/UL (ref 1–4.8)
LYMPHOCYTES NFR BLD: 40.8 %
MCH RBC QN AUTO: 30.7 PG (ref 27–31.3)
MCHC RBC AUTO-ENTMCNC: 34.3 % (ref 33–37)
MCV RBC AUTO: 89.4 FL (ref 79.4–94.8)
MONOCYTES # BLD: 0.7 K/UL (ref 0.2–0.8)
MONOCYTES NFR BLD: 7.4 %
NEUTROPHILS # BLD: 4.6 K/UL (ref 1.4–6.5)
NEUTS SEG NFR BLD: 46.6 %
PLATELET # BLD AUTO: 346 K/UL (ref 130–400)
POTASSIUM SERPL-SCNC: 4.1 MEQ/L (ref 3.4–4.9)
PROT SERPL-MCNC: 7.6 G/DL (ref 6.3–8)
RBC # BLD AUTO: 5.28 M/UL (ref 4.2–5.4)
SODIUM SERPL-SCNC: 140 MEQ/L (ref 135–144)
TRIGL SERPL-MCNC: 287 MG/DL (ref 0–150)
WBC # BLD AUTO: 9.8 K/UL (ref 4.8–10.8)

## 2024-01-31 ENCOUNTER — OFFICE VISIT (OUTPATIENT)
Dept: FAMILY MEDICINE CLINIC | Age: 64
End: 2024-01-31
Payer: COMMERCIAL

## 2024-01-31 VITALS
HEIGHT: 63 IN | WEIGHT: 147 LBS | SYSTOLIC BLOOD PRESSURE: 144 MMHG | HEART RATE: 98 BPM | OXYGEN SATURATION: 98 % | DIASTOLIC BLOOD PRESSURE: 94 MMHG | BODY MASS INDEX: 26.05 KG/M2

## 2024-01-31 DIAGNOSIS — M54.50 LUMBAR PAIN: ICD-10-CM

## 2024-01-31 DIAGNOSIS — F33.2 MAJOR DEPRESSIVE DISORDER, RECURRENT SEVERE WITHOUT PSYCHOTIC FEATURES (HCC): ICD-10-CM

## 2024-01-31 DIAGNOSIS — M54.2 NECK PAIN: Primary | ICD-10-CM

## 2024-01-31 DIAGNOSIS — F11.29 OPIOID DEPENDENCE WITH OPIOID-INDUCED DISORDER (HCC): ICD-10-CM

## 2024-01-31 DIAGNOSIS — R29.2 HYPERREFLEXIA: ICD-10-CM

## 2024-01-31 DIAGNOSIS — G95.9 CERVICAL MYELOPATHY (HCC): ICD-10-CM

## 2024-01-31 PROCEDURE — 99214 OFFICE O/P EST MOD 30 MIN: CPT | Performed by: NURSE PRACTITIONER

## 2024-01-31 RX ORDER — CLONIDINE HYDROCHLORIDE 0.1 MG/1
0.1 TABLET ORAL 2 TIMES DAILY
COMMUNITY
Start: 2024-01-26

## 2024-01-31 ASSESSMENT — PATIENT HEALTH QUESTIONNAIRE - PHQ9
SUM OF ALL RESPONSES TO PHQ QUESTIONS 1-9: 10
5. POOR APPETITE OR OVEREATING: 0
SUM OF ALL RESPONSES TO PHQ QUESTIONS 1-9: 10
1. LITTLE INTEREST OR PLEASURE IN DOING THINGS: 3
2. FEELING DOWN, DEPRESSED OR HOPELESS: 1
10. IF YOU CHECKED OFF ANY PROBLEMS, HOW DIFFICULT HAVE THESE PROBLEMS MADE IT FOR YOU TO DO YOUR WORK, TAKE CARE OF THINGS AT HOME, OR GET ALONG WITH OTHER PEOPLE: 1
3. TROUBLE FALLING OR STAYING ASLEEP: 0
6. FEELING BAD ABOUT YOURSELF - OR THAT YOU ARE A FAILURE OR HAVE LET YOURSELF OR YOUR FAMILY DOWN: 0
7. TROUBLE CONCENTRATING ON THINGS, SUCH AS READING THE NEWSPAPER OR WATCHING TELEVISION: 3
9. THOUGHTS THAT YOU WOULD BE BETTER OFF DEAD, OR OF HURTING YOURSELF: 0
SUM OF ALL RESPONSES TO PHQ9 QUESTIONS 1 & 2: 4
8. MOVING OR SPEAKING SO SLOWLY THAT OTHER PEOPLE COULD HAVE NOTICED. OR THE OPPOSITE, BEING SO FIGETY OR RESTLESS THAT YOU HAVE BEEN MOVING AROUND A LOT MORE THAN USUAL: 0
4. FEELING TIRED OR HAVING LITTLE ENERGY: 3

## 2024-01-31 ASSESSMENT — ENCOUNTER SYMPTOMS
BACK PAIN: 1
COUGH: 0
SHORTNESS OF BREATH: 0

## 2024-01-31 NOTE — PROGRESS NOTES
with opioid-induced disorder (HCC)  Stable. Pain improvement achieved with this medication.      5. Major depressive disorder, recurrent severe without psychotic features (HCC)  Going to increase elavil back to 150 mg.       6. Hyperreflexia  XR CERVICAL SPINE (4-5 VIEWS)    XR LUMBAR SPINE (2-3 VIEWS)        Consider cervical and lumbar MRI after xrays.   Hx of spinal surgery.    Orders Placed This Encounter   Procedures    XR CERVICAL SPINE (4-5 VIEWS)     Standing Status:   Future     Number of Occurrences:   1     Standing Expiration Date:   1/31/2025     Order Specific Question:   Reason for exam:     Answer:   pain    XR LUMBAR SPINE (2-3 VIEWS)     Standing Status:   Future     Number of Occurrences:   1     Standing Expiration Date:   1/31/2025       No orders of the defined types were placed in this encounter.      Medications Discontinued During This Encounter   Medication Reason    cloNIDine (CATAPRES) 0.3 MG tablet LIST CLEANUP      Side effects, adverse effects of the medication prescribed today, as well as treatment plan/ rationale and result expectations have been discussed with the patient who expresses understanding and desires to proceed.    Close follow up to evaluate treatment results and for coordination of care.  I have reviewed the patient's medical history in detail and updated the computerized patient record.    As always, patient is advised that if symptoms worsen in any way they will proceed to the nearest emergency room.          Marleni Mcgrath, RINA - CNP

## 2024-02-01 DIAGNOSIS — R29.2 HYPERREFLEXIA: ICD-10-CM

## 2024-02-01 DIAGNOSIS — R29.898 WEAKNESS OF UPPER EXTREMITY: ICD-10-CM

## 2024-02-01 DIAGNOSIS — R29.898 WEAKNESS OF LOWER EXTREMITY, UNSPECIFIED LATERALITY: ICD-10-CM

## 2024-02-01 DIAGNOSIS — G95.9 CERVICAL MYELOPATHY (HCC): ICD-10-CM

## 2024-02-01 DIAGNOSIS — M54.50 LUMBAR PAIN: ICD-10-CM

## 2024-02-01 DIAGNOSIS — M54.2 NECK PAIN: Primary | ICD-10-CM

## 2024-02-15 ENCOUNTER — TELEPHONE (OUTPATIENT)
Dept: FAMILY MEDICINE CLINIC | Age: 64
End: 2024-02-15

## 2024-02-15 NOTE — TELEPHONE ENCOUNTER
Received documentation from [employer] APPROVING MRI, see attached.     LM for pt that imaging is approved and to keep appt on 2/19

## 2024-02-19 ENCOUNTER — HOSPITAL ENCOUNTER (OUTPATIENT)
Dept: MRI IMAGING | Age: 64
Discharge: HOME | End: 2024-02-21
Payer: COMMERCIAL

## 2024-02-19 DIAGNOSIS — M54.2 NECK PAIN: ICD-10-CM

## 2024-02-19 DIAGNOSIS — R29.898 WEAKNESS OF LOWER EXTREMITY, UNSPECIFIED LATERALITY: ICD-10-CM

## 2024-02-19 DIAGNOSIS — M54.50 LUMBAR PAIN: ICD-10-CM

## 2024-02-19 DIAGNOSIS — R29.2 HYPERREFLEXIA: ICD-10-CM

## 2024-02-19 DIAGNOSIS — G95.9 CERVICAL MYELOPATHY (HCC): ICD-10-CM

## 2024-02-19 DIAGNOSIS — R29.898 WEAKNESS OF UPPER EXTREMITY: ICD-10-CM

## 2024-02-19 PROCEDURE — 72141 MRI NECK SPINE W/O DYE: CPT

## 2024-02-22 DIAGNOSIS — F11.29 OPIOID DEPENDENCE WITH OPIOID-INDUCED DISORDER (HCC): ICD-10-CM

## 2024-02-22 DIAGNOSIS — R93.7 ABNORMAL MAGNETIC RESONANCE IMAGING OF CERVICAL SPINE: ICD-10-CM

## 2024-02-22 DIAGNOSIS — M48.02 SPINAL STENOSIS OF CERVICAL REGION: ICD-10-CM

## 2024-02-22 DIAGNOSIS — M96.1 POSTLAMINECTOMY SYNDROME, CERVICAL REGION: ICD-10-CM

## 2024-02-22 DIAGNOSIS — G89.4 CHRONIC PAIN DISORDER: ICD-10-CM

## 2024-02-22 DIAGNOSIS — G95.9 CERVICAL MYELOPATHY (HCC): Primary | ICD-10-CM

## 2024-02-22 DIAGNOSIS — M54.2 NECK PAIN: ICD-10-CM

## 2024-02-22 DIAGNOSIS — F41.1 GAD (GENERALIZED ANXIETY DISORDER): ICD-10-CM

## 2024-02-22 DIAGNOSIS — G95.89 MYELOMALACIA (HCC): ICD-10-CM

## 2024-02-22 RX ORDER — CLONIDINE HYDROCHLORIDE 0.1 MG/1
0.1 TABLET ORAL 2 TIMES DAILY
Qty: 60 TABLET | Refills: 2 | Status: SHIPPED | OUTPATIENT
Start: 2024-02-22

## 2024-02-22 RX ORDER — HYDROCODONE BITARTRATE AND ACETAMINOPHEN 7.5; 325 MG/1; MG/1
1 TABLET ORAL EVERY 8 HOURS PRN
Qty: 90 TABLET | Refills: 0 | Status: SHIPPED | OUTPATIENT
Start: 2024-02-22 | End: 2024-03-23

## 2024-02-22 NOTE — TELEPHONE ENCOUNTER
Comments:     Last Office Visit (last PCP visit):   1/31/2024    Next Visit Date:  No future appointments.    **If hasn't been seen in over a year OR hasn't followed up according to last diabetes/ADHD visit, make appointment for patient before sending refill to provider.    Rx requested:  Requested Prescriptions     Pending Prescriptions Disp Refills    cloNIDine (CATAPRES) 0.1 MG tablet 60 tablet      Sig: Take 1 tablet by mouth 2 times daily    HYDROcodone-acetaminophen (NORCO) 7.5-325 MG per tablet 90 tablet 0     Sig: Take 1 tablet by mouth every 8 hours as needed for Pain for up to 30 days. Intended supply: 30 days Max Daily Amount: 3 tablets

## 2024-02-22 NOTE — PROGRESS NOTES
Patient Name: Natalie Valdez : 1960        Date: 2024      Type of Appt: Consult    Reason for appt: C: Cervical myelopathy (HCC)  M54.2 (ICD-10-CM) - Neck pain  M48.02 (ICD-10-CM) - Spinal stenosis of cervical region  G95.89 (ICD-10-CM) - Myelomalacia (HCC)  R93.7 (ICD-10-CM) - Abnormal magnetic resonance imaging of cervical spine @ [facility]     Referred by: Marleni Mcgrath APRN - CNP     Studies done: 2024 - MRI CERVICAL SPINE WO CONTRAST @ [facility]  2024 - XR CERVICAL SPINE @ [facility]    Smoking: Yes or No    REVIEW OF SYSTEMS:    Rash   Difficulty Urinating Nausea     Fever   Headaches  Bruising/Bleeding Easily     Hearing loss  Constipation  Sleep Disturbance    Shortness of breath Diarrhea  Neck Pain  Back Pain

## 2024-02-28 ENCOUNTER — INITIAL CONSULT (OUTPATIENT)
Dept: NEUROSURGERY | Age: 64
End: 2024-02-28
Payer: COMMERCIAL

## 2024-02-28 VITALS
BODY MASS INDEX: 26.05 KG/M2 | SYSTOLIC BLOOD PRESSURE: 128 MMHG | DIASTOLIC BLOOD PRESSURE: 78 MMHG | TEMPERATURE: 97.6 F | WEIGHT: 147 LBS | HEIGHT: 63 IN

## 2024-02-28 DIAGNOSIS — M48.02 CERVICAL STENOSIS OF SPINE: Primary | ICD-10-CM

## 2024-02-28 PROCEDURE — 99204 OFFICE O/P NEW MOD 45 MIN: CPT | Performed by: NEUROLOGICAL SURGERY

## 2024-02-28 ASSESSMENT — ENCOUNTER SYMPTOMS
EYE PAIN: 0
ABDOMINAL PAIN: 0
SHORTNESS OF BREATH: 0
TROUBLE SWALLOWING: 0
ABDOMINAL DISTENTION: 0
BACK PAIN: 1
COUGH: 0

## 2024-02-28 NOTE — PROGRESS NOTES
NEUROSURGERY CONSULT NOTE      Patient Name: Natalie Valdez  Patient : 1960  MRN: [id number]       PCP: Marleni Mcgrath APRN - CNP        History of Present Ilness: 63 y.o. presents in neurosurgical consultation from Marleni Mcgrath APRN - CNP with evaluation of cervical spine. She was dx with cervical stenosis over 25 years ago and has undergone 4 cervical surgeries, 2 anterior and 2 posterior. These helped with walking.   Now having legs giving out, drops things., difficulty with hand fx. Now more neck pain. Has pain in RUE, about the same. Bilateral shoulder pain. Weakness/numbness arms and legs.  Also LBP, no radiating pain.   Also HA's for several months.   Will be seeing Dr Manning from neurology in the next several weeks.     Chief Complaint   Patient presents with    Neck Pain          Conservative Treatments:  Physical Therapy: Yes  NSAID's: No  Narcotics: Yes-norco, was previously on fentanyl and dilaudid  Muscle relaxants: No  Epidural injections: Yes      Past Medical History:        Diagnosis Date    Anxiety     Arthritis     Depression     Drug effect     GERD (gastroesophageal reflux disease)     Hx of blood clots     Hyperlipidemia     Hypertension     Hypopotassemia     Lumbago     Osteopenia     PTSD (post-traumatic stress disorder)     Pulmonary embolism (HCC)     4 PE in bilat lungs    Spinal stenosis     congenital    Urinary incontinence        Past Surgical History:        Procedure Laterality Date    DILATION AND CURETTAGE      Miscarriage    HYSTERECTOMY (CERVIX STATUS UNKNOWN)      HYSTERECTOMY, TOTAL ABDOMINAL (CERVIX REMOVED)      MYOMECTOMY      SPINE SURGERY      cervical x 4, fusions and reconstruction    TONSILLECTOMY      age 4    UMBILICAL HERNIA REPAIR      UPPER GASTROINTESTINAL ENDOSCOPY  8/15/22       Home Medications:   Prior to Admission medications    Medication Sig Start Date End Date Taking? Authorizing Provider   cloNIDine (CATAPRES) 0.1

## 2024-03-19 ENCOUNTER — HOSPITAL ENCOUNTER (OUTPATIENT)
Dept: CT IMAGING | Age: 64
Discharge: HOME | End: 2024-03-21
Attending: NEUROLOGICAL SURGERY
Payer: COMMERCIAL

## 2024-03-19 DIAGNOSIS — M48.02 CERVICAL STENOSIS OF SPINE: ICD-10-CM

## 2024-03-19 PROCEDURE — 70450 CT HEAD/BRAIN W/O DYE: CPT

## 2024-04-04 DIAGNOSIS — G95.9 CERVICAL MYELOPATHY (HCC): Primary | ICD-10-CM

## 2024-04-04 DIAGNOSIS — F11.29 OPIOID DEPENDENCE WITH OPIOID-INDUCED DISORDER (HCC): ICD-10-CM

## 2024-04-04 NOTE — TELEPHONE ENCOUNTER
Comments:     Last Office Visit (last PCP visit):   1/31/2024    Next Visit Date:  Future Appointments   Date Time Provider Department Center   4/12/2024  2:00 PM Marleni Mcgrath APRN - CNP [facility]       **If hasn't been seen in over a year OR hasn't followed up according to last diabetes/ADHD visit, make appointment for patient before sending refill to provider.    Rx requested:  Requested Prescriptions     Pending Prescriptions Disp Refills    HYDROcodone-acetaminophen (NORCO) 7.5-325 MG per tablet 90 tablet 0     Sig: Take 1 tablet by mouth every 8 hours as needed for Pain for up to 30 days. Intended supply: 30 days Max Daily Amount: 3 tablets

## 2024-04-05 RX ORDER — HYDROCODONE BITARTRATE AND ACETAMINOPHEN 7.5; 325 MG/1; MG/1
1 TABLET ORAL EVERY 8 HOURS PRN
Qty: 90 TABLET | Refills: 0 | Status: SHIPPED | OUTPATIENT
Start: 2024-04-05 | End: 2024-05-05

## 2024-04-22 ENCOUNTER — OFFICE VISIT (OUTPATIENT)
Dept: FAMILY MEDICINE CLINIC | Age: 64
End: 2024-04-22
Payer: COMMERCIAL

## 2024-04-22 VITALS
TEMPERATURE: 98.6 F | DIASTOLIC BLOOD PRESSURE: 100 MMHG | HEIGHT: 64 IN | WEIGHT: 149.6 LBS | SYSTOLIC BLOOD PRESSURE: 150 MMHG | HEART RATE: 86 BPM | OXYGEN SATURATION: 96 % | BODY MASS INDEX: 25.54 KG/M2

## 2024-04-22 DIAGNOSIS — G89.4 CHRONIC PAIN DISORDER: ICD-10-CM

## 2024-04-22 DIAGNOSIS — F33.1 MAJOR DEPRESSIVE DISORDER, RECURRENT EPISODE, MODERATE (HCC): ICD-10-CM

## 2024-04-22 DIAGNOSIS — I10 ESSENTIAL HYPERTENSION: Primary | ICD-10-CM

## 2024-04-22 DIAGNOSIS — G95.9 CERVICAL MYELOPATHY (HCC): ICD-10-CM

## 2024-04-22 DIAGNOSIS — F11.29 OPIOID DEPENDENCE WITH OPIOID-INDUCED DISORDER (HCC): ICD-10-CM

## 2024-04-22 DIAGNOSIS — F41.1 GAD (GENERALIZED ANXIETY DISORDER): ICD-10-CM

## 2024-04-22 PROCEDURE — 3077F SYST BP >= 140 MM HG: CPT | Performed by: NURSE PRACTITIONER

## 2024-04-22 PROCEDURE — 3080F DIAST BP >= 90 MM HG: CPT | Performed by: NURSE PRACTITIONER

## 2024-04-22 PROCEDURE — 99214 OFFICE O/P EST MOD 30 MIN: CPT | Performed by: NURSE PRACTITIONER

## 2024-04-22 RX ORDER — LOSARTAN POTASSIUM 25 MG/1
25 TABLET ORAL DAILY
Qty: 30 TABLET | Refills: 5 | Status: SHIPPED | OUTPATIENT
Start: 2024-04-22

## 2024-04-22 RX ORDER — AMITRIPTYLINE HYDROCHLORIDE 150 MG/1
150 TABLET ORAL NIGHTLY
COMMUNITY
Start: 2023-09-12 | End: 2024-04-22

## 2024-04-22 RX ORDER — LUBIPROSTONE 24 UG/1
24 CAPSULE ORAL 2 TIMES DAILY WITH MEALS
COMMUNITY
Start: 2024-02-23

## 2024-04-22 RX ORDER — AMITRIPTYLINE HYDROCHLORIDE 150 MG/1
150 TABLET ORAL NIGHTLY
Qty: 30 TABLET | Refills: 5 | Status: SHIPPED | OUTPATIENT
Start: 2024-04-22

## 2024-04-22 ASSESSMENT — ENCOUNTER SYMPTOMS
COUGH: 0
BACK PAIN: 1
SHORTNESS OF BREATH: 0
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
## 2024-04-25 LAB
6MAM UR QL: NOT DETECTED
7-AMINOCLONAZEPAM: NOT DETECTED
ALPHA-OH-ALPRAZOLAM: NOT DETECTED
ALPHA-OH-MIDAZOLAM, URINE: NOT DETECTED
ALPRAZOLAM: NOT DETECTED
AMPHET UR QL SCN: NOT DETECTED
BARBITURATES: NEGATIVE
BENZOYLECGONINE: NEGATIVE
BUPRENORPHINE: NOT DETECTED
CARISOPRODOL UR QL: NEGATIVE
CLONAZEPAM UR QL: NOT DETECTED
CODEINE: NOT DETECTED
CREAT UR-MCNC: <20 MG/DL (ref 20–400)
DIAZEPAM: NOT DETECTED
ETHYL GLUCURONIDE: NEGATIVE
FENTANYL UR QL: NOT DETECTED
GABAPENTIN: NOT DETECTED
HYDROCODONE UR QL: PRESENT
HYDROMORPHONE: PRESENT
LORAZEPAM UR QL: NOT DETECTED
MARIJUANA METABOLITE: NORMAL
MDA: NOT DETECTED
MDEA: NOT DETECTED
MDMA UR QL: NOT DETECTED
MEPERIDINE: NOT DETECTED
METHADONE: NEGATIVE
METHAMPHETAMINE: NOT DETECTED
METHYLPHENIDATE: NOT DETECTED
MIDAZOLAM UR QL SCN: NOT DETECTED
MORPHINE: NOT DETECTED
NALOXONE: NOT DETECTED
NORBUPRENORPHINE, FREE: NOT DETECTED
NORDIAZEPAM: NOT DETECTED
NORFENTANYL: NOT DETECTED
NORHYDROCODONE, URINE: PRESENT
NOROXYCODONE: NOT DETECTED
NOROXYMORPHONE, URINE: NOT DETECTED
OXAZEPAM UR QL: NOT DETECTED
OXYCODONE UR QL: NOT DETECTED
OXYMORPHONE UR QL: NOT DETECTED
PAIN MANAGEMENT DRUG PANEL: NORMAL
PATHOLOGY STUDY: NORMAL
PCP: NEGATIVE
PHENTERMINE: NOT DETECTED
PREGABALIN: NOT DETECTED
TAPENTADOL, URINE: NOT DETECTED
TAPENTADOL-O-SULFATE, URINE: NOT DETECTED
TEMAZEPAM: NOT DETECTED
TRAMADOL: NEGATIVE
ZOLPIDEM: NOT DETECTED

## 2024-04-26 NOTE — TELEPHONE ENCOUNTER
Called patient to let her know to come to ED
Can you send prep to [facility] on [facility]
If she feels weak and unable to tolerate fluids , I recommend she come into the ED.
Patient with ongoing complaints of liquid stool 6 times daily. Weight loss and upper abd pain,    EGD/COLONONOSCOPY NEXT.
Pt called and states she is still having watery stool after immodium and benefiber and wants to know what the next step is. Please advise.
English

## 2024-05-21 SDOH — ECONOMIC STABILITY: HOUSING INSECURITY
IN THE LAST 12 MONTHS, WAS THERE A TIME WHEN YOU DID NOT HAVE A STEADY PLACE TO SLEEP OR SLEPT IN A SHELTER (INCLUDING NOW)?: NO

## 2024-05-21 SDOH — ECONOMIC STABILITY: FOOD INSECURITY: WITHIN THE PAST 12 MONTHS, YOU WORRIED THAT YOUR FOOD WOULD RUN OUT BEFORE YOU GOT MONEY TO BUY MORE.: PATIENT DECLINED

## 2024-05-21 SDOH — ECONOMIC STABILITY: INCOME INSECURITY: HOW HARD IS IT FOR YOU TO PAY FOR THE VERY BASICS LIKE FOOD, HOUSING, MEDICAL CARE, AND HEATING?: PATIENT DECLINED

## 2024-05-21 SDOH — ECONOMIC STABILITY: FOOD INSECURITY: WITHIN THE PAST 12 MONTHS, THE FOOD YOU BOUGHT JUST DIDN'T LAST AND YOU DIDN'T HAVE MONEY TO GET MORE.: PATIENT DECLINED

## 2024-05-23 ENCOUNTER — OFFICE VISIT (OUTPATIENT)
Dept: FAMILY MEDICINE CLINIC | Age: 64
End: 2024-05-23
Payer: COMMERCIAL

## 2024-05-23 VITALS
BODY MASS INDEX: 25.33 KG/M2 | OXYGEN SATURATION: 97 % | HEIGHT: 64 IN | DIASTOLIC BLOOD PRESSURE: 108 MMHG | WEIGHT: 148.4 LBS | HEART RATE: 92 BPM | SYSTOLIC BLOOD PRESSURE: 168 MMHG | TEMPERATURE: 98.7 F

## 2024-05-23 DIAGNOSIS — G95.9 CERVICAL MYELOPATHY (HCC): ICD-10-CM

## 2024-05-23 DIAGNOSIS — I10 ESSENTIAL HYPERTENSION: Primary | ICD-10-CM

## 2024-05-23 DIAGNOSIS — F41.9 ANXIETY AND DEPRESSION: ICD-10-CM

## 2024-05-23 DIAGNOSIS — F41.1 GAD (GENERALIZED ANXIETY DISORDER): ICD-10-CM

## 2024-05-23 DIAGNOSIS — F32.A ANXIETY AND DEPRESSION: ICD-10-CM

## 2024-05-23 PROCEDURE — 3077F SYST BP >= 140 MM HG: CPT | Performed by: NURSE PRACTITIONER

## 2024-05-23 PROCEDURE — 3080F DIAST BP >= 90 MM HG: CPT | Performed by: NURSE PRACTITIONER

## 2024-05-23 PROCEDURE — 99214 OFFICE O/P EST MOD 30 MIN: CPT | Performed by: NURSE PRACTITIONER

## 2024-05-23 RX ORDER — LOSARTAN POTASSIUM 50 MG/1
50 TABLET ORAL DAILY
Qty: 30 TABLET | Refills: 5 | Status: SHIPPED | OUTPATIENT
Start: 2024-05-23

## 2024-05-23 RX ORDER — HYDROCODONE BITARTRATE AND ACETAMINOPHEN 7.5; 325 MG/1; MG/1
1 TABLET ORAL EVERY 8 HOURS PRN
Qty: 90 TABLET | Refills: 0 | Status: SHIPPED | OUTPATIENT
Start: 2024-05-23 | End: 2024-06-22

## 2024-05-23 RX ORDER — CLONIDINE HYDROCHLORIDE 0.2 MG/1
0.2 TABLET ORAL 2 TIMES DAILY
Qty: 60 TABLET | Refills: 5 | Status: SHIPPED | OUTPATIENT
Start: 2024-05-23

## 2024-05-23 NOTE — PROGRESS NOTES
(37.1 °C)    SpO2: 97%    Weight: 67.3 kg (148 lb 6.4 oz)    Height: 1.613 m (5' 3.5\")      Physical Exam  Vitals and nursing note reviewed.   Constitutional:       Appearance: Normal appearance.   HENT:      Head: Normocephalic.      Nose: Nose normal.      Mouth/Throat:      Mouth: Mucous membranes are moist.      Pharynx: Oropharynx is clear.   Eyes:      Extraocular Movements: Extraocular movements intact.      Conjunctiva/sclera: Conjunctivae normal.      Pupils: Pupils are equal, round, and reactive to light.   Cardiovascular:      Rate and Rhythm: Normal rate and regular rhythm.      Pulses: Normal pulses.      Heart sounds: Normal heart sounds.   Pulmonary:      Effort: Pulmonary effort is normal.      Breath sounds: Normal breath sounds.   Musculoskeletal:      Cervical back: Neck supple.   Skin:     General: Skin is warm.   Neurological:      General: No focal deficit present.      Mental Status: She is alert and oriented to person, place, and time. Mental status is at baseline.   Psychiatric:         Attention and Perception: Attention normal.         Mood and Affect: Mood is anxious and depressed. Affect is tearful.         Speech: Speech normal.         Behavior: Behavior is withdrawn.         Thought Content: Thought content normal.         Cognition and Memory: Cognition normal.         Judgment: Judgment normal.         Assessment & Plan     Diagnosis Orders   1. Essential hypertension  losartan (COZAAR) 50 MG tablet      2. DEBI (generalized anxiety disorder)  cloNIDine (CATAPRES) 0.2 MG tablet    cariprazine hcl (VRAYLAR) 1.5 MG capsule    Ambulatory referral to Behavioral Health      3. Cervical myelopathy (HCC)  HYDROcodone-acetaminophen (NORCO) 7.5-325 MG per tablet      4. Anxiety and depression  cariprazine hcl (VRAYLAR) 1.5 MG capsule    Ambulatory referral to Behavioral Health          Orders Placed This Encounter   Procedures    Ambulatory referral to Behavioral Health     Referral Priority:

## 2024-05-27 ASSESSMENT — ENCOUNTER SYMPTOMS
SHORTNESS OF BREATH: 0
COUGH: 0

## 2024-06-05 ENCOUNTER — OFFICE VISIT (OUTPATIENT)
Dept: FAMILY MEDICINE CLINIC | Age: 64
End: 2024-06-05
Payer: COMMERCIAL

## 2024-06-05 VITALS
SYSTOLIC BLOOD PRESSURE: 128 MMHG | BODY MASS INDEX: 25.61 KG/M2 | HEIGHT: 64 IN | DIASTOLIC BLOOD PRESSURE: 80 MMHG | WEIGHT: 150 LBS | OXYGEN SATURATION: 97 % | HEART RATE: 76 BPM | TEMPERATURE: 98 F

## 2024-06-05 DIAGNOSIS — I10 ESSENTIAL HYPERTENSION: ICD-10-CM

## 2024-06-05 DIAGNOSIS — F41.1 GAD (GENERALIZED ANXIETY DISORDER): Primary | ICD-10-CM

## 2024-06-05 DIAGNOSIS — F41.9 ANXIETY AND DEPRESSION: ICD-10-CM

## 2024-06-05 DIAGNOSIS — F32.A ANXIETY AND DEPRESSION: ICD-10-CM

## 2024-06-05 PROCEDURE — 99213 OFFICE O/P EST LOW 20 MIN: CPT | Performed by: NURSE PRACTITIONER

## 2024-06-05 PROCEDURE — 3079F DIAST BP 80-89 MM HG: CPT | Performed by: NURSE PRACTITIONER

## 2024-06-05 PROCEDURE — 3074F SYST BP LT 130 MM HG: CPT | Performed by: NURSE PRACTITIONER

## 2024-06-05 ASSESSMENT — ENCOUNTER SYMPTOMS
DIARRHEA: 0
CONSTIPATION: 0
SHORTNESS OF BREATH: 0
COUGH: 0
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
## 2024-06-27 DIAGNOSIS — E78.5 HYPERLIPIDEMIA, UNSPECIFIED HYPERLIPIDEMIA TYPE: ICD-10-CM

## 2024-06-27 RX ORDER — ATORVASTATIN CALCIUM 10 MG/1
10 TABLET, FILM COATED ORAL DAILY
Qty: 90 TABLET | Refills: 1 | Status: SHIPPED | OUTPATIENT
Start: 2024-06-27

## 2024-06-27 NOTE — TELEPHONE ENCOUNTER
Comments: Please review, thanks    Last Office Visit (last PCP visit):   6/5/2024    Next Visit Date:  Future Appointments   Date Time Provider Department Center   7/5/2024  2:30 PM Mitul Manning MD [facility]   7/5/2024  3:45 PM [facility] ROOM 1 [facility]   7/8/2024  3:30 PM Marleni Mcgrath, APRN - CNP [facility]       **If hasn't been seen in over a year OR hasn't followed up according to last diabetes/ADHD visit, make appointment for patient before sending refill to provider.    Rx requested:  Requested Prescriptions     Pending Prescriptions Disp Refills    atorvastatin (LIPITOR) 10 MG tablet 90 tablet 1     Sig: Take 1 tablet by mouth daily

## 2024-07-01 ENCOUNTER — TELEPHONE (OUTPATIENT)
Dept: FAMILY MEDICINE CLINIC | Age: 64
End: 2024-07-01

## 2024-07-01 RX ORDER — HYDROXYZINE PAMOATE 25 MG/1
25 CAPSULE ORAL 3 TIMES DAILY PRN
Qty: 30 CAPSULE | Refills: 0 | Status: SHIPPED | OUTPATIENT
Start: 2024-07-01 | End: 2024-07-11

## 2024-07-01 NOTE — TELEPHONE ENCOUNTER
Pt wanting a phone call back from provider due to her wanting to check herself into the  until but wanted to speak with you first.     [phone]

## 2024-07-01 NOTE — TELEPHONE ENCOUNTER
Spoke to patient, informed her of message, stated that her friend works on the unit up there and they do not  have any beds available, trying to pass ER, she is not going to kill herself she is just on the edge. Wanted to know if there is anyway that vu can give her something to get through for a few days until she starts feeling better

## 2024-07-01 NOTE — TELEPHONE ENCOUNTER
The only thing I can give is hydroxyzine if she would like. I still would recommend she be evaluated through the ER

## 2024-07-05 ENCOUNTER — HOSPITAL ENCOUNTER (OUTPATIENT)
Dept: NEUROLOGY | Age: 64
Discharge: HOME | End: 2024-07-05
Payer: COMMERCIAL

## 2024-07-05 ENCOUNTER — HOSPITAL ENCOUNTER (OUTPATIENT)
Dept: MRI IMAGING | Age: 64
End: 2024-07-05
Payer: COMMERCIAL

## 2024-07-05 DIAGNOSIS — M54.50 LUMBAR PAIN: ICD-10-CM

## 2024-07-05 DIAGNOSIS — G95.9 CERVICAL MYELOPATHY (HCC): ICD-10-CM

## 2024-07-05 DIAGNOSIS — R29.898 WEAKNESS OF UPPER EXTREMITY: ICD-10-CM

## 2024-07-05 DIAGNOSIS — G60.0 HEREDITARY MOTOR AND SENSORY NEUROPATHY: ICD-10-CM

## 2024-07-05 DIAGNOSIS — R29.2 HYPERREFLEXIA: ICD-10-CM

## 2024-07-05 DIAGNOSIS — M54.2 NECK PAIN: ICD-10-CM

## 2024-07-05 DIAGNOSIS — R29.898 WEAKNESS OF LOWER EXTREMITY, UNSPECIFIED LATERALITY: ICD-10-CM

## 2024-07-05 PROCEDURE — 95886 MUSC TEST DONE W/N TEST COMP: CPT

## 2024-07-05 PROCEDURE — 72148 MRI LUMBAR SPINE W/O DYE: CPT

## 2024-07-05 PROCEDURE — 95910 NRV CNDJ TEST 7-8 STUDIES: CPT

## 2024-07-05 NOTE — PROCEDURES
[facility]                   [address] [place] [address]                             ELECTROMYOGRAM      PATIENT NAME: JOSHUA LOPEZ              : 1960  MED REC NO: [id number]                        ROOM:   ACCOUNT NO: [id number]                       ADMIT DATE: 2024  PROVIDER: Mitul Manning MD       DATE OF :  2024    REFERRING PHYSICIAN:  Lilia Manning MD    REASON FOR STUDY:  The patient has a history of neck pain.  She has a history of cervical stenosis with multiple neck surgeries.     According to the patient, she has a long-standing history of hyperreflexia in the limbs.    Motor nerve conduction velocity is normal in the left tibial nerve and mildly slowed in other nerves tested.    F-wave latency is delayed in the right tibial nerve, normal in the right common peroneal nerve, and borderline in the left peroneal and tibial nerves.    Distal motor and sensory latencies are normal in all the nerves tested.    Amplitude of sensory responses is decreased on stimulating the sural and the superficial peroneal nerves.    On the concentric needle electrode examination, the patient had difficulty giving effort in many of the muscles tested, probably due to previous history of cervical myelopathy.      The patient has mild denervation changes in the L5 root distribution bilaterally with mild involvement of right L4 root.    CLINICAL INTERPRETATION:  Mild bilateral L5 radiculopathy with involvement of right L4 root.    The patient had difficulty giving effort in many of the muscles tested, probably due to component of cervical myelopathy.  She is known to have a hyperreflexia.    The patient is going for imaging of the lumbar spine.     Somewhat slow motor nerve conduction velocities suggestive of possibility of peripheral neuropathy process.    If clinically indicated, the patient could be screened for causes of peripheral neuropathy, such as

## 2024-07-08 ENCOUNTER — OFFICE VISIT (OUTPATIENT)
Dept: FAMILY MEDICINE CLINIC | Age: 64
End: 2024-07-08
Payer: COMMERCIAL

## 2024-07-08 VITALS
SYSTOLIC BLOOD PRESSURE: 148 MMHG | OXYGEN SATURATION: 98 % | TEMPERATURE: 98.1 F | DIASTOLIC BLOOD PRESSURE: 90 MMHG | HEIGHT: 64 IN | BODY MASS INDEX: 25.54 KG/M2 | WEIGHT: 149.6 LBS | HEART RATE: 67 BPM

## 2024-07-08 DIAGNOSIS — R39.9 UTI SYMPTOMS: ICD-10-CM

## 2024-07-08 DIAGNOSIS — F41.1 GAD (GENERALIZED ANXIETY DISORDER): ICD-10-CM

## 2024-07-08 DIAGNOSIS — F33.1 MAJOR DEPRESSIVE DISORDER, RECURRENT EPISODE, MODERATE (HCC): Primary | ICD-10-CM

## 2024-07-08 DIAGNOSIS — G89.4 CHRONIC PAIN DISORDER: ICD-10-CM

## 2024-07-08 DIAGNOSIS — G95.9 CERVICAL MYELOPATHY (HCC): ICD-10-CM

## 2024-07-08 LAB
BILIRUBIN, POC: NORMAL
BLOOD URINE, POC: NORMAL
CLARITY, POC: CLEAR
COLOR, POC: NORMAL
GLUCOSE URINE, POC: NORMAL
KETONES, POC: NORMAL
LEUKOCYTE EST, POC: NORMAL
NITRITE, POC: NORMAL
PH, POC: 7
PROTEIN, POC: NORMAL
SPECIFIC GRAVITY, POC: 1.01
UROBILINOGEN, POC: 0.2

## 2024-07-08 PROCEDURE — 81003 URINALYSIS AUTO W/O SCOPE: CPT | Performed by: NURSE PRACTITIONER

## 2024-07-08 PROCEDURE — 99214 OFFICE O/P EST MOD 30 MIN: CPT | Performed by: NURSE PRACTITIONER

## 2024-07-08 RX ORDER — HYDROCODONE BITARTRATE AND ACETAMINOPHEN 7.5; 325 MG/1; MG/1
1 TABLET ORAL EVERY 8 HOURS PRN
Qty: 90 TABLET | Refills: 0 | Status: SHIPPED | OUTPATIENT
Start: 2024-07-08 | End: 2024-08-07

## 2024-07-08 ASSESSMENT — ENCOUNTER SYMPTOMS
SHORTNESS OF BREATH: 0
COUGH: 0

## 2024-07-08 NOTE — PROGRESS NOTES
dysphoric mood. The patient is nervous/anxious.        Objective  Vitals:    07/08/24 1523 07/08/24 1532   BP: (!) 150/98 (!) 148/90   Site: Left Upper Arm Left Upper Arm   Position: Sitting Sitting   Cuff Size: Medium Adult Medium Adult   Pulse: 67    Temp: 98.1 °F (36.7 °C)    SpO2: 98%    Weight: 67.9 kg (149 lb 9.6 oz)    Height: 1.613 m (5' 3.5\")      Physical Exam  Vitals and nursing note reviewed.   Constitutional:       Appearance: Normal appearance.   HENT:      Head: Normocephalic.      Nose: Nose normal.      Mouth/Throat:      Mouth: Mucous membranes are moist.      Pharynx: Oropharynx is clear.   Eyes:      Extraocular Movements: Extraocular movements intact.      Conjunctiva/sclera: Conjunctivae normal.      Pupils: Pupils are equal, round, and reactive to light.   Cardiovascular:      Rate and Rhythm: Normal rate and regular rhythm.      Pulses: Normal pulses.      Heart sounds: Normal heart sounds.   Pulmonary:      Effort: Pulmonary effort is normal.      Breath sounds: Normal breath sounds.   Abdominal:      General: There is distension.   Skin:     General: Skin is warm.   Neurological:      General: No focal deficit present.      Mental Status: She is alert and oriented to person, place, and time. Mental status is at baseline.   Psychiatric:         Attention and Perception: Attention normal.         Mood and Affect: Mood is depressed. Affect is labile.         Speech: Speech normal.         Behavior: Behavior is withdrawn.         Thought Content: Thought content normal.         Cognition and Memory: Cognition normal.         Judgment: Judgment normal.         Assessment & Plan     Diagnosis Orders   1. Major depressive disorder, recurrent episode, moderate (HCC)  Fu with ETHOS.       2. UTI symptoms  POCT Urinalysis No Micro (Auto)    Culture, Urine      3. Cervical myelopathy (HCC)  HYDROcodone-acetaminophen (NORCO) 7.5-325 MG per tablet      4. DEBI (generalized anxiety disorder)  Fu with

## 2024-07-09 DIAGNOSIS — R39.9 UTI SYMPTOMS: ICD-10-CM

## 2024-07-10 LAB — BACTERIA UR CULT: NORMAL

## 2024-08-12 ENCOUNTER — OFFICE VISIT (OUTPATIENT)
Age: 64
End: 2024-08-12
Payer: COMMERCIAL

## 2024-08-12 VITALS
HEIGHT: 64 IN | DIASTOLIC BLOOD PRESSURE: 88 MMHG | BODY MASS INDEX: 25.61 KG/M2 | WEIGHT: 150 LBS | SYSTOLIC BLOOD PRESSURE: 138 MMHG | TEMPERATURE: 97.6 F

## 2024-08-12 DIAGNOSIS — R53.1 WEAKNESS: Primary | ICD-10-CM

## 2024-08-12 PROCEDURE — 99214 OFFICE O/P EST MOD 30 MIN: CPT | Performed by: NEUROLOGICAL SURGERY

## 2024-08-12 ASSESSMENT — ENCOUNTER SYMPTOMS
BACK PAIN: 1
ABDOMINAL DISTENTION: 0
EYE PAIN: 0
TROUBLE SWALLOWING: 0
SHORTNESS OF BREATH: 0
COUGH: 0
ABDOMINAL PAIN: 0

## 2024-08-12 NOTE — PROGRESS NOTES
NEUROSURGERY and SPINE FOLLOW-UP NOTE      Patient Name: Natalie Valdez  Patient : 1960  MRN: [id number]       PCP: Marleni Mcgrath APRN - CNP      History of Present Ilness: 64 y.o. presents with F/U. The HA's have resolved since I saw her. Cervical CT was denied.  She continues to have similar symptoms when I saw her last.  She is having walking difficulties, difficulties with hand function and dropping things.  She is having weakness and numbness in arms and legs.  She feels that she has weakness in legs, at times the RLE gives out. Still with difficulty with hand fx, drops things. No pain down the legs, some pain down RUE to hand since . Also LBP and neck pain.  Has chronic bowel and bladder incontinence.  Previously dx with RSD.    Chief Complaint   Patient presents with    Back Pain          Conservative Treatments:  Physical Therapy: Yes  NSAID's: No  Narcotics: Yes-norco, was previously on fentanyl and dilaudid  Muscle relaxants: No  Epidural injections: Yes         Past Medical History:        Diagnosis Date    Anxiety     Arthritis     Depression     Drug effect     GERD (gastroesophageal reflux disease)     Hx of blood clots     Hyperlipidemia     Hypertension     Hypopotassemia     Lumbago     Osteopenia     PTSD (post-traumatic stress disorder)     Pulmonary embolism (HCC)     4 PE in bilat lungs    Spinal stenosis     congenital    Urinary incontinence        Past Surgical History:        Procedure Laterality Date    DILATION AND CURETTAGE      Miscarriage    HYSTERECTOMY (CERVIX STATUS UNKNOWN)      HYSTERECTOMY, TOTAL ABDOMINAL (CERVIX REMOVED)      MYOMECTOMY      SPINE SURGERY      cervical x 4, fusions and reconstruction    TONSILLECTOMY      age 4    UMBILICAL HERNIA REPAIR      UPPER GASTROINTESTINAL ENDOSCOPY  8/15/22       Home Medications:   Prior to Admission medications    Medication Sig Start Date End Date Taking? Authorizing Provider   atorvastatin

## 2024-08-13 ENCOUNTER — OFFICE VISIT (OUTPATIENT)
Dept: FAMILY MEDICINE CLINIC | Age: 64
End: 2024-08-13
Payer: COMMERCIAL

## 2024-08-13 VITALS
DIASTOLIC BLOOD PRESSURE: 86 MMHG | HEIGHT: 64 IN | TEMPERATURE: 98.2 F | SYSTOLIC BLOOD PRESSURE: 136 MMHG | HEART RATE: 86 BPM | BODY MASS INDEX: 25.57 KG/M2 | WEIGHT: 149.8 LBS | OXYGEN SATURATION: 97 %

## 2024-08-13 DIAGNOSIS — F41.1 GAD (GENERALIZED ANXIETY DISORDER): ICD-10-CM

## 2024-08-13 DIAGNOSIS — F33.1 MAJOR DEPRESSIVE DISORDER, RECURRENT EPISODE, MODERATE (HCC): ICD-10-CM

## 2024-08-13 DIAGNOSIS — R10.32 LLQ PAIN: ICD-10-CM

## 2024-08-13 DIAGNOSIS — R29.2 HYPERREFLEXIA: ICD-10-CM

## 2024-08-13 DIAGNOSIS — G95.9 CERVICAL MYELOPATHY (HCC): Primary | ICD-10-CM

## 2024-08-13 PROCEDURE — 99213 OFFICE O/P EST LOW 20 MIN: CPT | Performed by: NURSE PRACTITIONER
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
## 2024-08-14 ASSESSMENT — ENCOUNTER SYMPTOMS
ABDOMINAL PAIN: 1
COUGH: 0
BACK PAIN: 1
CONSTIPATION: 1
SHORTNESS OF BREATH: 0

## 2024-08-19 ENCOUNTER — PATIENT MESSAGE (OUTPATIENT)
Dept: FAMILY MEDICINE CLINIC | Age: 64
End: 2024-08-19

## 2024-08-19 DIAGNOSIS — F41.9 ANXIETY AND DEPRESSION: Primary | ICD-10-CM

## 2024-08-19 DIAGNOSIS — F32.A ANXIETY AND DEPRESSION: Primary | ICD-10-CM

## 2024-08-19 DIAGNOSIS — M54.17 LUMBOSACRAL RADICULOPATHY DUE TO TRAUMA: ICD-10-CM

## 2024-08-19 DIAGNOSIS — G95.9 CERVICAL MYELOPATHY (HCC): ICD-10-CM

## 2024-08-19 RX ORDER — HYDROXYZINE PAMOATE 25 MG/1
25 CAPSULE ORAL 3 TIMES DAILY PRN
Qty: 30 CAPSULE | Refills: 0 | Status: SHIPPED | OUTPATIENT
Start: 2024-08-19 | End: 2024-09-02

## 2024-08-19 RX ORDER — HYDROCODONE BITARTRATE AND ACETAMINOPHEN 7.5; 325 MG/1; MG/1
1 TABLET ORAL EVERY 8 HOURS PRN
Qty: 90 TABLET | Refills: 0 | Status: SHIPPED | OUTPATIENT
Start: 2024-08-19 | End: 2024-09-18

## 2024-08-19 NOTE — TELEPHONE ENCOUNTER
Comments:     Last Office Visit (last PCP visit):   8/13/2024    Next Visit Date:  Future Appointments   Date Time Provider Department Center   9/11/2024  3:00 PM Aakash Grant MD [facility]   11/13/2024  3:30 PM Marleni Mcgrath, APRN - CNP [facility]       **If hasn't been seen in over a year OR hasn't followed up according to last diabetes/ADHD visit, make appointment for patient before sending refill to provider.    Rx requested:  Requested Prescriptions     Pending Prescriptions Disp Refills    HYDROcodone-acetaminophen (NORCO) 7.5-325 MG per tablet 90 tablet 0     Sig: Take 1 tablet by mouth every 8 hours as needed for Pain for up to 90 days. Intended supply: 30 days Max Daily Amount: 3 tablets    hydrOXYzine pamoate (VISTARIL) 25 MG capsule 30 capsule 0     Sig: Take 1 capsule by mouth 3 times daily as needed for Itching

## 2024-09-11 DIAGNOSIS — M54.12 BRACHIAL NEURITIS OR RADICULITIS: ICD-10-CM

## 2024-09-11 RX ORDER — TOPIRAMATE 100 MG/1
100 TABLET, FILM COATED ORAL 2 TIMES DAILY
Qty: 180 TABLET | Refills: 1 | Status: SHIPPED | OUTPATIENT
Start: 2024-09-11 | End: 2025-03-10

## 2024-09-26 ENCOUNTER — PATIENT MESSAGE (OUTPATIENT)
Dept: FAMILY MEDICINE CLINIC | Age: 64
End: 2024-09-26

## 2024-09-26 DIAGNOSIS — M54.12 BRACHIAL NEURITIS OR RADICULITIS: ICD-10-CM

## 2024-09-26 DIAGNOSIS — G95.9 CERVICAL MYELOPATHY (HCC): Primary | ICD-10-CM

## 2024-09-26 DIAGNOSIS — M54.17 LUMBOSACRAL RADICULOPATHY DUE TO TRAUMA: ICD-10-CM

## 2024-09-27 RX ORDER — HYDROCODONE BITARTRATE AND ACETAMINOPHEN 7.5; 325 MG/1; MG/1
1 TABLET ORAL EVERY 8 HOURS PRN
Qty: 90 TABLET | Refills: 0 | Status: SHIPPED | OUTPATIENT
Start: 2024-09-27 | End: 2024-12-26

## 2024-10-15 ENCOUNTER — PATIENT MESSAGE (OUTPATIENT)
Dept: FAMILY MEDICINE CLINIC | Age: 64
End: 2024-10-15

## 2024-10-15 DIAGNOSIS — F32.A ANXIETY AND DEPRESSION: Primary | ICD-10-CM

## 2024-10-15 DIAGNOSIS — F41.9 ANXIETY AND DEPRESSION: Primary | ICD-10-CM

## 2024-10-15 DIAGNOSIS — F41.1 GAD (GENERALIZED ANXIETY DISORDER): ICD-10-CM

## 2024-10-15 RX ORDER — HYDROXYZINE PAMOATE 25 MG/1
25 CAPSULE ORAL 3 TIMES DAILY PRN
Qty: 30 CAPSULE | Refills: 0 | Status: SHIPPED | OUTPATIENT
Start: 2024-10-15 | End: 2024-10-29

## 2024-10-15 RX ORDER — AMITRIPTYLINE HYDROCHLORIDE 150 MG/1
150 TABLET ORAL NIGHTLY
Qty: 30 TABLET | Refills: 5 | Status: SHIPPED | OUTPATIENT
Start: 2024-10-15

## 2024-10-15 NOTE — TELEPHONE ENCOUNTER
Comments:     Last Office Visit (last PCP visit):   8/13/2024    Next Visit Date:  Future Appointments   Date Time Provider Department Center   11/13/2024  3:30 PM Marleni Mcgrath APRN - CNP [facility]       **If hasn't been seen in over a year OR hasn't followed up according to last diabetes/ADHD visit, make appointment for patient before sending refill to provider.    Rx requested:  Requested Prescriptions     Pending Prescriptions Disp Refills    hydrOXYzine pamoate (VISTARIL) 25 MG capsule 30 capsule 0     Sig: Take 1 capsule by mouth 3 times daily as needed for Itching    amitriptyline (ELAVIL) 150 MG tablet 30 tablet 5     Sig: Take 1 tablet by mouth nightly

## 2024-10-18 NOTE — PROGRESS NOTES
Behavioral Health Consultation  Cyndy Oliver Psy.D. Psychologist  8/10/20  2:07 PM EDT      Time spent with Patient: 30 minutes  This is patient's fourth  [facility] appointment. Reason for Consult:  depression  Referring Provider: RINA Castillo - CNP      Feedback given to PCP. TELEHEALTH EVALUATION -- Audio and/or Visual ([facility])    Due to COVID 19 outbreak, patient's office visit was converted to a virtual visit. Patient was contacted and agreed to proceed with a virtual visit via [employer]. Patient reports that they are located at home. Provider located at home office. The risks and benefits of converting to a virtual visit were discussed in light of the current infectious disease epidemic. Patient also understood that insurance coverage and co-pays are up to their individual insurance plans. Patient provides verbal consent for this visit to be billed to insurance. Pursuant to the emergency declaration under the [facility], [facility] waiver authority and the [employer] Act, this Virtual  Visit was conducted, with patient's consent, to reduce the patient's risk of exposure to COVID-19 and provide continuity of care for an established patient. Services were provided through an audio and video synchronous discussion virtually to substitute for in-person clinic visit. S:  Pt reports that she got off the Wellbutrin and she is no longer irritable or as easily frustrated. She reports that she is now taking Elavil and she notes this is helping a lot and also helping her stomach issues. Pt discussed that she is feeling increased energy and motivation. Discussed behavioral activation approach and plan for balance of accomplishment and enjoyable related tasks. Pt reports she continues to get support from family. Describes some recent stress in her family of origin.   Pt denies Consultation - Neurology   Name: Sarah Zayas 79 y.o. female I MRN: [id number]  Unit/Bed#: ICU 06 I Date of Admission: 10/10/2024   Date of Service: 10/18/2024 I Hospital Day: 8   Inpatient consult to Neurology  Consult performed by: TITO Olivo  Consult ordered by: Luz Mukherjee MD        Physician Requesting Evaluation: Juan Lehman MD   Reason for Evaluation / Principal Problem: Seizure    Assessment & Plan  Seizure-like activity (HCC)  79 y.o.  female with temporal arteritis (on low-dose prednisone), COPD, GERD, HTN, anxiety, depression, hypothyroidism, macular degeneration, and chronic respiratory failure with hypercapnia who initially presented to  for deterioration with poor PO intake and weakness.  She was initiated on BiPAP for hypoxia/hypercapnia and transferred to [facility] for critical care service.    Hospital course complicated by: Oversedation secondary to benzos, anxiety/depression for which psych was consulted for (see below), possible Cymbalta induced hyponatremia (nephrology following), iatrogenic hyperthyroidism (Synthroid reduced), bicep tendinitis (PT/OT recommending rehab), conjunctivitis of left eye (placed on ophthalmic antibiotic drops), urine culture positive for Aerococcus urinae (currently on ceftriaxone).    Rapid response initiated around 6 AM on 10/18 for unresponsiveness and fluctuating tonic (? clonic, ICU staff reports some possible shaking) activity. Stroke alert was then called after RRT noticed anisocoria (R >L) during neuroexam.  There were times where patient improved described as relaxation in RUE, some verbal output and more responsive, however was not answering questions appropriately or following commands.  LKW 5 - 5:30 AM. Initial CT imaging negative for acute intracranial changes, LVO or significant stenosis.  Out of concern for seizure rather than stroke, she was loaded on IV Keppra 2 g.    Pertinent neurological workup:  -CTA head/neck (10/9): 2  SI and HI. [place] O:  MSE:  Appearance    alert, cooperative   Personal Hygiene : appropriately dressed, appropriately groomed and healthy looking  Appetite abnormal: fluctuates  Sleep disturbance No  Fatigue No  Loss of pleasure Yes  Impulsive behavior No  Speech    spontaneous, normal rate and normal volume  Mood   neutral   Affect    neutral-euthymic affect  Thought Content    intact and helplessness  Thought Process    linear, goal directed and coherent  Associations    logical connections  Insight    fair  Judgment    fair  Orientation    oriented to person, place, time, and general circumstances  Memory    recent and remote memory intact  Attention/Concentration    impaired  Morbid ideation no  Suicide Assessment    no suicidal ideation      History:    Medications:   Current Outpatient Medications   Medication Sig Dispense Refill    amitriptyline (ELAVIL) 100 MG tablet Take 1 tablet by mouth nightly 30 tablet 3    amitriptyline (ELAVIL) 25 MG tablet Take 1 tablet by mouth nightly for 3 days, THEN 2 tablets nightly for 4 days, THEN 3 tablets nightly for 7 days. 32 tablet 0    propranolol (INDERAL) 10 MG tablet Take 1 tablet by mouth 3 times daily as needed (for anxiety and irritability) 42 tablet 0    HYDROcodone-acetaminophen (NORCO) 7.5-325 MG per tablet Take 1 tablet by mouth every 8 hours as needed for Pain for up to 30 days. Intended supply: 30 days 90 tablet 0    loperamide (IMODIUM) 1 MG/5ML solution Take by mouth 4 times daily as needed for Diarrhea      Wheat Dextrin (BENEFIBER) POWD Take 4 g by mouth 3 times daily (with meals)      lidocaine (XYLOCAINE) 5 % ointment Apply topically as needed. 1 Tube 11    topiramate (TOPAMAX) 100 MG tablet Take 100 mg by mouth daily        No current facility-administered medications for this visit.         Social History:   Social History     Socioeconomic History    Marital status:      Spouse name: Kiley Centeno Number of children: 1    Years of mm left A2/A3 junction aneurysm, negative for acute intracranial abnormalities or significant stenosis.  -CTH x 2 (10/10 and 10/13) unremarkable for acute changes  -MRI brain wo (10/16) chronic microangiopathic, negative for acute intracranial abnormality  -CTH and CTA head/neck (10/18, stroke alert)  - Negative for significant stenosis or acute intracranial changes  - Redemonstrated intracranial aneurysms - R P-Comm (2.5 mm) and left LISA A2/A3 junction (2 mm).   - Labs 10/18:   - Hyponatremia 118-> 123   - Leukocytosis 14   - Magnesium 1.8   - WNL: phosphorus, procal, troponin, INR/PTT, glucose    Focal motor seizure could be provoked in the setting of significant hyponatremia, however cannot fully rule out structural abnormality.    Plan:  - s/p Keppra 2 g, no need to initiate AED maintenance at this time however we will can reconsider if patient were to have additional seizures  - Would try to avoid ativan given current respiratory status, however if needed can give 2 mg for seizure like activity >2 minutes  - Routine EEG and MRI brain with contrast seizure protocol pending (okay to defer the wo contrast portions given recent MRI brain wo completed 10/16)  - If there is concern for subclinical seizures, recommend transfer to [place] for vEEG  - Telemetry  - Frequent neuro checks  - Seizure precautions  - Medical management and supportive care per primary team. Correction of any metabolic or infectious disturbances.     Plan discussed with Attending Neurologist, please see attestation for further input/recommendations.   Anxiety/depression   Chronic anxiety/depression    - Evaluated by psychiatry during admission, patient underwent multiple changes to medications:  - Gabapentin was switched to pregabalin 25 mg BID on 10/13, this was later discontinued on 10/16.  - Patient was previously taking Buspar x few months PTA after discontinuation of ativan 0.25 mg BID PRN in August.   - Buspar was discontinued due to  worsening insomnia on admission.  - Remeron was initiated, however this was switched to Cymbalta 30 mg qHS on 10/13, later discontinued on 10/16.      Recommendations for outpatient neurological follow up have yet to be determined.    History of Present Illness   Sarah Zayas is a 79 y.o.  female with temporal arteritis (on low-dose prednisone), COPD, GERD, HTN, anxiety/depression, hypothyroidism, macular degeneration, and chronic respiratory failure with hypercapnia who initially presented to  for deterioration with poor p.o. intake and weakness. She was noted to have hypoxia/hypercapnia and was initiated on BiPAP, however eventually transferred to [facility] for critical care service.    Hospital course: initial concern for COPD exacerbation, however this was thought to be secondary to oversedation from IV Ativan that she received at  while undergoing imaging.  She was evaluated by psychiatry for anxiety/depression - per chart review, Gabapentin was switched to pregabalin 25 mg BID on 10/13, this was later discontinued on 10/16.  Patient was previously taking Buspar x few months PTA after discontinuation of ativan 0.25 mg BID PRN in August. Buspar was discontinued due to worsening insomnia on admission. Remeron was initiated, however this was switched to Cymbalta 30 mg qHS on 10/13, later discontinued on 10/16. Cymbalta was initiated, however nephrology consulted for possible Cymbalta induced hyponatremia. She also complained of neck/shoulder pain and had evidence of bicep tendinitis, PT/OT recommending discharge to rehab.  Concern for iatrogenic hyperthyroidism as TSH was suppressed and free T4 was elevated, Synthroid was reduced to 25 mcg with plans to repeat testing in 8 to 12 weeks OP.  Concern for conjunctivitis in the left eye due to decrease in eyesight with discharge, placed on ophthalmic antibiotic drops.  UA was concerning for infection, culture positive for Aerococcus urinae, currently receiving  ceftriaxone.    Rapid response called around 6 AM on 10/18 for unresponsiveness and fluctuating tonic (?clonic, ICU staff reports some possible shaking) activity.  Per my discussion with bedside RN, stroke alert was then called after RRT noticed anisocoria (R >L) during neuroexam.  There were times where patient improved described as relaxation in RUE, some verbal output and more responsive, however was not answering questions appropriately or following commands. LKW 5 - 5:30 AM.  CT head unremarkable, CTA negative for significant stenosis or LVO. Out of concern for seizure rather than stroke, she was loaded with IV Keppra 2 g.    Son was present at bedside towards the end of neuro evaluation.  He reported patient has never had seizures in the past or has been on AED management.      Review of Systems   Unable to perform ROS: Mental status change    Does not answer ROS questions    Historical Information   Past Medical History:   Diagnosis Date    Anxiety 2024    Asthma     COPD (chronic obstructive pulmonary disease) (HCC)     Disease of thyroid gland     GERD (gastroesophageal reflux disease)     Hypertension 10/11/2018    Hypothyroid     Normocytic anemia 2024    Osteoarthritis 2012    Seizure-like activity (HCC) 10/18/2024    Temporal arteritis (HCC)     Type 2 diabetes mellitus with complication, without long-term current use of insulin (HCC) 2020     Past Surgical History:   Procedure Laterality Date    EYE SURGERY Right 2019    cataract LVCFS    HYSTERECTOMY      NO PAST SURGERIES      ALSO NO RELEVANT PAST SURRGICAL HX AS PER [facility]     Social History     Tobacco Use    Smoking status: Former     Current packs/day: 0.00     Average packs/day: 1 pack/day for 54.0 years (54.0 ttl pk-yrs)     Types: Cigarettes     Start date:      Quit date: 2013     Years since quittin.8    Smokeless tobacco: Never    Tobacco comments:     TOBACCO USE, NO PASSIVE SMOKE EXPOSURE   Vaping  indicates a self report of mild symptom distress. Pt would benefit from [facility] services to increase coping skills to provide symptom management/control/relief. PHQ Scores 8/10/2020 7/31/2020 5/20/2020 5/6/2020 4/15/2020 8/9/2018   PHQ2 Score 3 6 4 6 2 1   PHQ9 Score 5 22 10 16 2 1     Interpretation of Total Score Depression Severity: 1-4 = Minimal depression, 5-9 = Mild depression, 10-14 = Moderate depression, 15-19 = Moderately severe depression, 20-27 = Severe depression      Diagnosis:    Major depressive disorder; recurrent      Diagnosis Date    Anxiety     Depression     Hyperlipidemia     Hypopotassemia     Lumbago     Osteopenia     PTSD (post-traumatic stress disorder)     Pulmonary embolism (HCC)     4 PE in bilat lungs    Spinal stenosis     congenital           Plan:  Pt interventions:  Trained in strategies for increasing balanced thinking, Discussed and set plan for behavioral activation, Discussed self-care (sleep, nutrition, rewarding activities, social support, exercise), [place]-setting to identify pt's primary goals for [facility] visit / overall health, Supportive techniques and Emphasized self-care as important for managing overall health      Pt Behavioral Change Plan:  Follow up in 1-2 weeks  Behavioral activation plan as discussed in session      Please note this report has been partially produced using speech recognition software  And may cause contain errors related to that system including grammar, punctuation and spelling as well as words and phrases that may seem inappropriate. If there are questions or concerns please feel free to contact me to clarify. Use    Vaping status: Never Used   Substance and Sexual Activity    Alcohol use: Never    Drug use: No    Sexual activity: Not Currently     E-Cigarette/Vaping    E-Cigarette Use Never User      E-Cigarette/Vaping Substances    Nicotine No     THC No     CBD No     Flavoring No     Other No     Unknown No      Family History   Problem Relation Age of Onset    Breast cancer Mother     Emphysema Father      Social History     Tobacco Use    Smoking status: Former     Current packs/day: 0.00     Average packs/day: 1 pack/day for 54.0 years (54.0 ttl pk-yrs)     Types: Cigarettes     Start date:      Quit date:      Years since quittin.8    Smokeless tobacco: Never    Tobacco comments:     TOBACCO USE, NO PASSIVE SMOKE EXPOSURE   Vaping Use    Vaping status: Never Used   Substance and Sexual Activity    Alcohol use: Never    Drug use: No    Sexual activity: Not Currently       Current Facility-Administered Medications:     acetaminophen (TYLENOL) tablet 975 mg, Q8H RACHEL    albuterol (PROVENTIL HFA,VENTOLIN HFA) inhaler 2 puff, Q6H PRN    amLODIPine (NORVASC) tablet 5 mg, Daily    Artificial Tears Op Soln 1 drop, TID    budesonide (PULMICORT) inhalation solution 0.5 mg, BID    cefTRIAXone (ROCEPHIN) 1,000 mg in dextrose 5 % 50 mL IVPB, Q24H, Last Rate: 1,000 mg (10/17/24 1715)    chlorhexidine (PERIDEX) 0.12 % oral rinse 15 mL, Q12H RACHEL    cyanocobalamin (VITAMIN B-12) tablet 1,000 mcg, Daily    Diclofenac Sodium (VOLTAREN) 1 % topical gel 2 g, 4x Daily    enoxaparin (LOVENOX) subcutaneous injection 30 mg, Daily    ipratropium-albuterol (DUO-NEB) 0.5-2.5 mg/3 mL inhalation solution 3 mL, Q6H PRN    levalbuterol (XOPENEX) inhalation solution 1.25 mg, BID    levothyroxine tablet 25 mcg, Early Morning    lidocaine (LIDODERM) 5 % patch 1 patch, Daily    LORazepam (ATIVAN) injection 2 mg, Once    magnesium hydroxide (MILK OF MAGNESIA) oral suspension 30 mL, Daily PRN    melatonin tablet 3 mg, HS PRN     menthol-methyl salicylate (BENGAY) 10-15 % cream, TID    multivitamin-minerals (CENTRUM) tablet 1 tablet, Daily    ofloxacin (OCUFLOX) 0.3 % ophthalmic solution 1 drop, 4x Daily    ondansetron (ZOFRAN) injection 4 mg, Q6H PRN    pantoprazole (PROTONIX) EC tablet 40 mg, Early Morning    phenol (CHLORASEPTIC) 1.4 % mucosal liquid 1 spray, Q2H PRN    polyethylene glycol (MIRALAX) packet 17 g, Daily    predniSONE tablet 2.5 mg, Daily With Breakfast    PreserVision AREDS 2 CAPS 1 capsule, Daily    propranolol (INDERAL) tablet 40 mg, Q12H RACHEL    senna-docusate sodium (SENOKOT S) 8.6-50 mg per tablet 1 tablet, BID    sodium chloride (OCEAN) 0.65 % nasal spray 2 spray, Q1H PRN    sodium chloride tablet 2 g, TID With Meals    Sodium Zirconium Cyclosilicate (Lokelma) 10 g, Daily    torsemide (DEMADEX) tablet 5 mg, Daily  Prior to Admission Medications   Prescriptions Last Dose Informant Patient Reported? Taking?   BLACK ELDERBERRY PO   Yes No   Sig: Take 2 tablets by mouth daily.   Calcium Carb-Cholecalciferol (CALCIUM 600/VITAMIN D3 PO)   Yes No   Sig: Take 1 tablet by mouth daily.   Humidifier MISC   No No   Sig: Use if needed (Attach to Oxygen concentrator)   Multiple Vitamins-Minerals (PreserVision AREDS 2) CAPS   No No   Sig: Take 1 capsule by mouth in the morning   Multiple Vitamins-Minerals (PreserVision AREDS 2+Multi Vit) CAPS  Self, Other (Specify) Yes Yes   Sig: Take 1 capsule by mouth every morning   acetaminophen (TYLENOL) 500 mg tablet  Self No No   Sig: Take 1 tablet (500 mg total) by mouth every 6 (six) hours as needed (pain fevers)   albuterol (PROVENTIL HFA,VENTOLIN HFA) 90 mcg/act inhaler  Self No No   Sig: Inhale 2 puffs every 6 (six) hours as needed for wheezing or shortness of breath   ascorbic acid (VITAMIN C) 500 mg tablet   Yes No   Sig: Take 500 mg by mouth daily.   aspirin (ECOTRIN LOW STRENGTH) 81 mg EC tablet  Self No No   Sig: Take 1 tablet (81 mg total) by mouth daily   budesonide (Pulmicort) 0.5  mg/2 mL nebulizer solution   No No   Sig: Take 2 mL (0.5 mg total) by nebulization 2 (two) times a day Rinse mouth after use. May take this right after taking levabuterol/ipratropium.   busPIRone (BUSPAR) 7.5 mg tablet   No No   Sig: Take 1 tablet (7.5 mg total) by mouth 3 (three) times a day   carboxymethylcellulose (Refresh Tears) 0.5 % SOLN   Yes No   Sig: Administer 2 drops to both eyes daily.   ipratropium (ATROVENT) 0.02 % nebulizer solution   No No   Sig: Take 2.5 mL (0.5 mg total) by nebulization 2 (two) times a day   levalbuterol (XOPENEX) 1.25 mg/3 mL nebulizer solution  Self Yes No   Sig: Take 1.25 mg by nebulization 2 (two) times a day   levothyroxine 50 mcg tablet  Self No No   Sig: TAKE ONE TABLET BY MOUTH ONCE DAILY   nitrofurantoin (MACROBID) 100 mg capsule   No No   Sig: Take 1 capsule (100 mg total) by mouth daily after lunch   oxygen gas   Yes No   Sig: Inhale 2 L/min continuous. via nasal cannula.    pantoprazole (PROTONIX) 40 mg tablet   No No   Sig: TAKE ONE TABLET BY MOUTH ONCE DAILY   predniSONE 2.5 mg tablet   No No   Sig: TAKE ONE TABLET BY MOUTH ONCE DAILY WITH BREAKFAST   propranolol (INDERAL) 10 mg tablet   No No   Sig: Take 1 tablet (10 mg total) by mouth 2 (two) times a day   sodium chloride (OCEAN) 0.65 % nasal spray   No No   Si spray into each nostril 3 (three) times a day   triamcinolone (KENALOG) 0.1 % cream   No No   Sig: Use on Q Tip in left ear, twice daily   valsartan (DIOVAN) 160 mg tablet  Self No No   Sig: Take 1 tablet (160 mg total) by mouth daily Please STOP Lisinopril,..   vitamin B-12 (VITAMIN B-12) 1,000 mcg tablet   Yes No   Sig: Take 1,000 mcg by mouth daily.      Facility-Administered Medications: None     Patient has no known allergies.    Objective :  Temp:  [97.3 °F (36.3 °C)-98.2 °F (36.8 °C)] 98 °F (36.7 °C)  HR:  [62-82] 70  BP: ()/(48-98) 131/60  Resp:  [16-30] 27  SpO2:  [88 %-100 %] 95 %  O2 Device: Nasal cannula  Nasal Cannula O2 Flow Rate  (L/min):  [2.5 L/min-5 L/min] 5 L/min    Physical Exam  Vitals reviewed. Chaperone present: Son at bedside towards end on exam.   Constitutional:       Appearance: She is not diaphoretic.      Comments: Drowsy, responsive to verbal stimulation however quickly falls asleep. Does not appear to be in acute stress.    HENT:      Head: Normocephalic and atraumatic.      Right Ear: External ear normal.      Left Ear: External ear normal.      Nose: Nose normal.      Mouth/Throat:      Comments: Difficult to assess 2/2 mental status  Eyes:      Comments: Anisocria present, does not track EOMs.   Cardiovascular:      Rate and Rhythm: Normal rate.   Pulmonary:      Effort: Pulmonary effort is normal. No respiratory distress.      Comments: Initially on BiPAP when provider entered room, removed per RN. Resp. status stable  Musculoskeletal:      Right lower leg: No edema.      Left lower leg: No edema.   Skin:     General: Skin is warm and dry.      Coloration: Skin is not pale.      Findings: Bruising present.   Neurological:      Comments: See below       Neurologic Exam     Mental Status     Patient responsive to verbal stimulation, however quickly falls asleep. Requires repeated verbal/tactile stimulation. Does not appropriately answer orientation questions. Does not name objects. Speech is nonsensical throughout exam, no dysarthria noted.        Cranial Nerves     Anisocoria present - L pupil 2 mm, R pupil 4 mm. Reactive to light. Does not spontaneously opens eyes, eyelids manually opened by provider. Conjugate gaze present. Does not track for EOM's. Face is symmetric at rest, does not attempt to smile. Does not protrude tongue.      Motor Exam   Seen spontaneously moving BUE antigravity. Did not attempt to lift BLE antigravity     Sensory Exam     Withdraws symmetrically to tactile stimulation on feet in BLE.      Gait, Coordination, and Reflexes   No tremors or clinical seizure like activity noted  Does not follow for  coordination testing           Lab Results: I have reviewed the following results:I have personally reviewed pertinent reports.  , CBC:   Results from last 7 days   Lab Units 10/18/24  0642 10/18/24  0445 10/17/24  0544 10/16/24  1601   WBC Thousand/uL  --  14.00* 4.73 6.07   RBC Million/uL  --  4.08 3.89 3.86   HEMOGLOBIN g/dL  --  11.6 11.0* 11.1*   I STAT HEMOGLOBIN g/dl 11.2*  --   --   --    HEMATOCRIT %  --  35.9 34.7* 35.9   HEMATOCRIT, ISTAT % 33*  --   --   --    MCV fL  --  88 89 93   PLATELETS Thousands/uL  --  318 214 212   , BMP/CMP:   Results from last 7 days   Lab Units 10/18/24  1306 10/18/24  0954 10/18/24  0642 10/18/24  0445 10/17/24  0005 10/16/24  1601   SODIUM mmol/L 123* 123*  --  118*   < > 124*   POTASSIUM mmol/L 4.2 4.2  --  5.3   < > 4.8   CHLORIDE mmol/L 80* 79*  --  75*   < > 78*   CO2 mmol/L 43* 42*  --  40*   < > 45*   BUN mg/dL 17 18  --  21   < > 29*   CREATININE mg/dL 0.30* 0.32*  --  0.34*   < > 0.43*   GLUCOSE, ISTAT mg/dl  --   --  120  --   --   --    CALCIUM mg/dL 7.8* 8.0*  --  8.4   < > 8.8   AST U/L  --   --   --   --   --  30   ALT U/L  --   --   --   --   --  25   ALK PHOS U/L  --   --   --   --   --  41   EGFR ml/min/1.73sq m 109 106  --  104   < > 97    < > = values in this interval not displayed.   , Vitamin B12:   , TSH:   Results from last 7 days   Lab Units 10/17/24  0657   TSH 3RD GENERATON uIU/mL 3.182   , Urinalysis:   Results from last 7 days   Lab Units 10/16/24  1406   COLOR UA  Yellow   CLARITY UA  Turbid   SPEC GRAV UA  1.026   PH UA  6.5   LEUKOCYTES UA  Large*   NITRITE UA  Negative   GLUCOSE UA mg/dl Trace*   KETONES UA mg/dl Negative   BILIRUBIN UA  Negative   BLOOD UA  Negative     Recent Labs     10/18/24  0445 10/18/24  0642 10/18/24  0724 10/18/24  0954 10/18/24  1306   WBC 14.00*  --   --   --   --    HGB 11.6 11.2*  --   --   --    HCT 35.9 33*  --   --   --      --   --   --   --    SODIUM 118*  --   --    < > 123*   K 5.3  --   --    < >  4.2   CL 75*  --   --    < > 80*   CO2 40*  --   --    < > 43*   BUN 21  --   --    < > 17   CREATININE 0.34*  --   --    < > 0.30*   GLUC 165*  --   --    < > 87   CAIONIZED  --  1.14  --   --   --    MG  --   --  1.8*  --   --    PHOS  --   --  3.2  --   --     < > = values in this interval not displayed.     Imaging results reviewed: CT head, CTA head/neck, MRI brain      VTE Prophylaxis: VTE covered by:  enoxaparin, Subcutaneous, 30 mg at 10/18/24 0816

## 2024-11-13 ENCOUNTER — OFFICE VISIT (OUTPATIENT)
Dept: FAMILY MEDICINE CLINIC | Age: 64
End: 2024-11-13

## 2024-11-13 VITALS
TEMPERATURE: 98.8 F | BODY MASS INDEX: 26.02 KG/M2 | DIASTOLIC BLOOD PRESSURE: 90 MMHG | HEART RATE: 101 BPM | SYSTOLIC BLOOD PRESSURE: 150 MMHG | WEIGHT: 152.4 LBS | OXYGEN SATURATION: 98 % | HEIGHT: 64 IN

## 2024-11-13 DIAGNOSIS — I10 ESSENTIAL HYPERTENSION: ICD-10-CM

## 2024-11-13 DIAGNOSIS — Z87.891 PERSONAL HISTORY OF TOBACCO USE: ICD-10-CM

## 2024-11-13 DIAGNOSIS — G95.9 CERVICAL MYELOPATHY (HCC): ICD-10-CM

## 2024-11-13 DIAGNOSIS — F41.9 ANXIETY: Primary | ICD-10-CM

## 2024-11-13 DIAGNOSIS — M54.12 BRACHIAL NEURITIS OR RADICULITIS: ICD-10-CM

## 2024-11-13 DIAGNOSIS — F41.1 GAD (GENERALIZED ANXIETY DISORDER): ICD-10-CM

## 2024-11-13 DIAGNOSIS — Z12.31 BREAST CANCER SCREENING BY MAMMOGRAM: ICD-10-CM

## 2024-11-13 DIAGNOSIS — M54.17 LUMBOSACRAL RADICULOPATHY DUE TO TRAUMA: ICD-10-CM

## 2024-11-13 RX ORDER — CLONIDINE HYDROCHLORIDE 0.2 MG/1
0.2 TABLET ORAL 2 TIMES DAILY
Qty: 60 TABLET | Refills: 5 | Status: SHIPPED | OUTPATIENT
Start: 2024-11-13

## 2024-11-13 RX ORDER — HYDROXYZINE PAMOATE 25 MG/1
25 CAPSULE ORAL 3 TIMES DAILY PRN
Qty: 90 CAPSULE | Refills: 0 | Status: SHIPPED | OUTPATIENT
Start: 2024-11-13

## 2024-11-13 RX ORDER — LOSARTAN POTASSIUM 100 MG/1
100 TABLET ORAL DAILY
Qty: 30 TABLET | Refills: 5 | Status: SHIPPED | OUTPATIENT
Start: 2024-11-13

## 2024-11-13 RX ORDER — HYDROXYZINE PAMOATE 25 MG/1
25 CAPSULE ORAL 3 TIMES DAILY PRN
COMMUNITY
Start: 2024-08-01 | End: 2024-11-13 | Stop reason: SDUPTHER

## 2024-11-13 RX ORDER — HYDROCODONE BITARTRATE AND ACETAMINOPHEN 7.5; 325 MG/1; MG/1
1 TABLET ORAL EVERY 8 HOURS PRN
Qty: 90 TABLET | Refills: 0 | Status: SHIPPED | OUTPATIENT
Start: 2024-11-13 | End: 2025-02-11

## 2024-11-13 RX ORDER — VILAZODONE HYDROCHLORIDE 20 MG/1
20 TABLET ORAL DAILY
COMMUNITY
Start: 2024-08-01

## 2024-11-13 ASSESSMENT — ENCOUNTER SYMPTOMS
COUGH: 0
SHORTNESS OF BREATH: 0

## 2024-11-13 NOTE — PROGRESS NOTES
Discussed with the patient the current USPSTF guidelines released March 9, 2021 for screening for lung cancer.    For adults aged 50 to 80 years who have a 20 pack-year smoking history and currently smoke or have quit within the past 15 years the grade B recommendation is to:  Screen for lung cancer with low-dose computed tomography (LDCT) every year.  Stop screening once a person has not smoked for 15 years or has a health problem that limits life expectancy or the ability to have lung surgery.    The patient  reports that she has been smoking cigarettes. She has a 20 pack-year smoking history. She has been exposed to tobacco smoke. She has never used smokeless tobacco.. Discussed with patient the risks and benefits of screening, including over-diagnosis, false positive rate, and total radiation exposure.  The patient currently exhibits no signs or symptoms suggestive of lung cancer.  Discussed with patient the importance of compliance with yearly annual lung cancer screenings and willingness to undergo diagnosis and treatment if screening scan is positive.  In addition, the patient was counseled regarding the importance of remaining smoke free and/or total smoking cessation.    Also reviewed the following if the patient has Medicare that as of February 10, 2022, Medicare only covers LDCT screening in patients aged 50-77 with at least a 20 pack-year smoking history who currently smoke or have quit in the last 15 years  
to person, place, and time. Mental status is at baseline.   Psychiatric:         Mood and Affect: Mood normal.         Behavior: Behavior normal.         Thought Content: Thought content normal.         Judgment: Judgment normal.            Assessment & Plan   Diagnosis Orders   1. Anxiety  hydrOXYzine pamoate (VISTARIL) 25 MG capsule      2. Cervical myelopathy (HCC)  HYDROcodone-acetaminophen (NORCO) 7.5-325 MG per tablet      3. Lumbosacral radiculopathy due to trauma  HYDROcodone-acetaminophen (NORCO) 7.5-325 MG per tablet      4. Brachial neuritis or radiculitis  HYDROcodone-acetaminophen (NORCO) 7.5-325 MG per tablet      5. DEBI (generalized anxiety disorder)  cloNIDine (CATAPRES) 0.2 MG tablet      6. Essential hypertension  losartan (COZAAR) 100 MG tablet      7. Personal history of tobacco use  [place] VISIT TO DISCUSS LUNG CA SCREEN W LDCT    CT Lung Screen (Initial/Annual/Baseline)      8. Breast cancer screening by mammogram  [facility] DIGITAL SCREEN W OR WO CAD BILATERAL          Orders Placed This Encounter   Procedures    CT Lung Screen (Initial/Annual/Baseline)     Age: Patient is 64 y.o.    Smoking History:   Tobacco Use      Smoking status: Every Day        Packs/day: 0.50        Years: 0.5 packs/day for 40.0 years (20.0 ttl pk-yrs)        Types: Cigarettes        Passive exposure: Current      Smokeless tobacco: Never        Date of last lung cancer screening: 3/3/2023     Standing Status:   Future     Standing Expiration Date:   5/13/2026     Order Specific Question:   Is there documentation of shared decision making?     Answer:   Yes     Order Specific Question:   Is this a low dose CT or a routine CT?     Answer:   Low Dose CT [1]     Order Specific Question:   Is this the first (baseline) CT or an annual exam?     Answer:   Annual [2]     Order Specific Question:   Does the patient show any signs or symptoms of lung cancer?     Answer:   No     Order Specific Question:   Smoking Status?     Answer:

## 2024-12-02 ENCOUNTER — HOSPITAL ENCOUNTER (OUTPATIENT)
Dept: CT IMAGING | Age: 64
Discharge: HOME | End: 2024-12-04
Payer: MEDICARE

## 2024-12-02 ENCOUNTER — HOSPITAL ENCOUNTER (OUTPATIENT)
Dept: WOMENS IMAGING | Age: 64
Discharge: HOME | End: 2024-12-04
Payer: MEDICARE

## 2024-12-02 DIAGNOSIS — Z87.891 PERSONAL HISTORY OF TOBACCO USE: ICD-10-CM

## 2024-12-02 DIAGNOSIS — Z12.31 BREAST CANCER SCREENING BY MAMMOGRAM: ICD-10-CM

## 2024-12-02 PROCEDURE — 71271 CT THORAX LUNG CANCER SCR C-: CPT

## 2024-12-02 PROCEDURE — 77063 BREAST TOMOSYNTHESIS BI: CPT

## 2024-12-11 ENCOUNTER — OFFICE VISIT (OUTPATIENT)
Dept: FAMILY MEDICINE CLINIC | Age: 64
End: 2024-12-11
Payer: MEDICARE

## 2024-12-11 VITALS
HEART RATE: 85 BPM | SYSTOLIC BLOOD PRESSURE: 128 MMHG | BODY MASS INDEX: 26.93 KG/M2 | TEMPERATURE: 98.6 F | OXYGEN SATURATION: 97 % | HEIGHT: 63 IN | WEIGHT: 152 LBS | DIASTOLIC BLOOD PRESSURE: 82 MMHG

## 2024-12-11 DIAGNOSIS — R63.1 POLYDIPSIA: ICD-10-CM

## 2024-12-11 DIAGNOSIS — I10 ESSENTIAL HYPERTENSION: ICD-10-CM

## 2024-12-11 DIAGNOSIS — R00.2 PALPITATIONS: ICD-10-CM

## 2024-12-11 DIAGNOSIS — F41.9 ANXIETY AND DEPRESSION: ICD-10-CM

## 2024-12-11 DIAGNOSIS — R00.2 HEART PALPITATIONS: Primary | ICD-10-CM

## 2024-12-11 DIAGNOSIS — F32.A ANXIETY AND DEPRESSION: ICD-10-CM

## 2024-12-11 DIAGNOSIS — R00.2 HEART PALPITATIONS: ICD-10-CM

## 2024-12-11 LAB
ALBUMIN SERPL-MCNC: 4.6 G/DL (ref 3.5–4.6)
ALP SERPL-CCNC: 117 U/L (ref 40–130)
ALT SERPL-CCNC: 26 U/L (ref 0–33)
ANION GAP SERPL CALCULATED.3IONS-SCNC: 10 MEQ/L (ref 9–15)
AST SERPL-CCNC: 23 U/L (ref 0–35)
BILIRUB SERPL-MCNC: 0.4 MG/DL (ref 0.2–0.7)
BUN SERPL-MCNC: 11 MG/DL (ref 8–23)
CALCIUM SERPL-MCNC: 10 MG/DL (ref 8.5–9.9)
CHLORIDE SERPL-SCNC: 101 MEQ/L (ref 95–107)
CO2 SERPL-SCNC: 27 MEQ/L (ref 20–31)
CREAT SERPL-MCNC: 0.73 MG/DL (ref 0.5–0.9)
ERYTHROCYTE [DISTWIDTH] IN BLOOD BY AUTOMATED COUNT: 13.1 % (ref 11.5–14.5)
GLOBULIN SER CALC-MCNC: 2.7 G/DL (ref 2.3–3.5)
GLUCOSE SERPL-MCNC: 87 MG/DL (ref 70–99)
HCT VFR BLD AUTO: 43.5 % (ref 37–47)
HGB BLD-MCNC: 15.1 G/DL (ref 12–16)
MCH RBC QN AUTO: 31.6 PG (ref 27–31.3)
MCHC RBC AUTO-ENTMCNC: 34.7 % (ref 33–37)
MCV RBC AUTO: 91 FL (ref 79.4–94.8)
PLATELET # BLD AUTO: 316 K/UL (ref 130–400)
POTASSIUM SERPL-SCNC: 4.2 MEQ/L (ref 3.4–4.9)
PROT SERPL-MCNC: 7.3 G/DL (ref 6.3–8)
RBC # BLD AUTO: 4.78 M/UL (ref 4.2–5.4)
SODIUM SERPL-SCNC: 138 MEQ/L (ref 135–144)
TSH REFLEX: 1.41 UIU/ML (ref 0.44–3.86)
WBC # BLD AUTO: 11.7 K/UL (ref 4.8–10.8)

## 2024-12-11 PROCEDURE — 99214 OFFICE O/P EST MOD 30 MIN: CPT | Performed by: NURSE PRACTITIONER

## 2024-12-11 PROCEDURE — 3074F SYST BP LT 130 MM HG: CPT | Performed by: NURSE PRACTITIONER

## 2024-12-11 PROCEDURE — 3079F DIAST BP 80-89 MM HG: CPT | Performed by: NURSE PRACTITIONER

## 2024-12-11 ASSESSMENT — ENCOUNTER SYMPTOMS
SHORTNESS OF BREATH: 0
COUGH: 0
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
## 2024-12-12 LAB
ESTIMATED AVERAGE GLUCOSE: 126 MG/DL
HBA1C MFR BLD: 6 % (ref 4–6)

## 2024-12-18 ENCOUNTER — PATIENT MESSAGE (OUTPATIENT)
Dept: FAMILY MEDICINE CLINIC | Age: 64
End: 2024-12-18

## 2024-12-18 DIAGNOSIS — R00.0 TACHYCARDIA: ICD-10-CM

## 2024-12-18 DIAGNOSIS — G95.9 CERVICAL MYELOPATHY (HCC): ICD-10-CM

## 2024-12-18 DIAGNOSIS — M54.17 LUMBOSACRAL RADICULOPATHY DUE TO TRAUMA: ICD-10-CM

## 2024-12-18 DIAGNOSIS — E78.5 HYPERLIPIDEMIA, UNSPECIFIED HYPERLIPIDEMIA TYPE: ICD-10-CM

## 2024-12-18 DIAGNOSIS — M54.12 BRACHIAL NEURITIS OR RADICULITIS: ICD-10-CM

## 2024-12-18 DIAGNOSIS — I10 ESSENTIAL HYPERTENSION: ICD-10-CM

## 2024-12-18 RX ORDER — CARVEDILOL 12.5 MG/1
TABLET ORAL
Qty: 180 TABLET | Refills: 3 | Status: SHIPPED | OUTPATIENT
Start: 2024-12-18

## 2024-12-18 RX ORDER — ATORVASTATIN CALCIUM 10 MG/1
10 TABLET, FILM COATED ORAL DAILY
Qty: 90 TABLET | Refills: 1 | Status: SHIPPED | OUTPATIENT
Start: 2024-12-18

## 2024-12-18 RX ORDER — HYDROCODONE BITARTRATE AND ACETAMINOPHEN 7.5; 325 MG/1; MG/1
1 TABLET ORAL EVERY 8 HOURS PRN
Qty: 90 TABLET | Refills: 0 | Status: SHIPPED | OUTPATIENT
Start: 2024-12-18 | End: 2025-03-18

## 2024-12-18 NOTE — TELEPHONE ENCOUNTER
Comments: Patient asking about cardiac monitor    Last Office Visit (last PCP visit):   12/11/2024    Next Visit Date:  Future Appointments   Date Time Provider Department Center   3/11/2025  3:00 PM Marleni Mcgrath APRN - CNP [facility] DEP       **If hasn't been seen in over a year OR hasn't followed up according to last diabetes/ADHD visit, make appointment for patient before sending refill to provider.    Rx requested:  Requested Prescriptions     Pending Prescriptions Disp Refills    atorvastatin (LIPITOR) 10 MG tablet 90 tablet 1     Sig: Take 1 tablet by mouth daily    carvedilol (COREG) 12.5 MG tablet 180 tablet 3     Sig: Take 1 tablet by mouth twice daily    HYDROcodone-acetaminophen (NORCO) 7.5-325 MG per tablet 90 tablet 0     Sig: Take 1 tablet by mouth every 8 hours as needed for Pain for up to 90 days. Intended supply: 30 days Max Daily Amount: 3 tablets

## 2024-12-30 ENCOUNTER — TELEPHONE (OUTPATIENT)
Dept: FAMILY MEDICINE CLINIC | Age: 64
End: 2024-12-30

## 2025-01-28 DIAGNOSIS — M54.17 LUMBOSACRAL RADICULOPATHY DUE TO TRAUMA: ICD-10-CM

## 2025-01-28 DIAGNOSIS — G95.9 CERVICAL MYELOPATHY (HCC): ICD-10-CM

## 2025-01-28 DIAGNOSIS — M54.12 BRACHIAL NEURITIS OR RADICULITIS: ICD-10-CM

## 2025-01-28 RX ORDER — HYDROCODONE BITARTRATE AND ACETAMINOPHEN 7.5; 325 MG/1; MG/1
1 TABLET ORAL EVERY 8 HOURS PRN
Qty: 90 TABLET | Refills: 0 | Status: SHIPPED | OUTPATIENT
Start: 2025-01-28 | End: 2025-04-28

## 2025-01-28 NOTE — TELEPHONE ENCOUNTER
Comments: Last refill 12-18-24    Last Office Visit (last PCP visit):   12/11/2024    Next Visit Date:  Future Appointments   Date Time Provider Department Center   3/11/2025  3:00 PM Marleni Mcgrath APRN - CNP [facility] DEP       **If hasn't been seen in over a year OR hasn't followed up according to last diabetes/ADHD visit, make appointment for patient before sending refill to provider.    Rx requested:  Requested Prescriptions     Pending Prescriptions Disp Refills    HYDROcodone-acetaminophen (NORCO) 7.5-325 MG per tablet 90 tablet 0     Sig: Take 1 tablet by mouth every 8 hours as needed for Pain for up to 90 days. Intended supply: 30 days Max Daily Amount: 3 tablets

## 2025-02-10 ENCOUNTER — HOSPITAL ENCOUNTER (OUTPATIENT)
Age: 65
Discharge: HOME | End: 2025-02-12
Payer: MEDICARE

## 2025-02-10 DIAGNOSIS — R00.2 PALPITATIONS: ICD-10-CM

## 2025-02-10 PROCEDURE — 93242 EXT ECG>48HR<7D RECORDING: CPT

## 2025-03-13 DIAGNOSIS — G95.9 CERVICAL MYELOPATHY (HCC): ICD-10-CM

## 2025-03-13 DIAGNOSIS — F41.9 ANXIETY: ICD-10-CM

## 2025-03-13 DIAGNOSIS — M54.17 LUMBOSACRAL RADICULOPATHY DUE TO TRAUMA: ICD-10-CM

## 2025-03-13 DIAGNOSIS — M54.12 BRACHIAL NEURITIS OR RADICULITIS: ICD-10-CM

## 2025-03-13 RX ORDER — HYDROXYZINE PAMOATE 25 MG/1
25 CAPSULE ORAL 3 TIMES DAILY PRN
Qty: 90 CAPSULE | Refills: 0 | Status: SHIPPED | OUTPATIENT
Start: 2025-03-13

## 2025-03-13 RX ORDER — HYDROCODONE BITARTRATE AND ACETAMINOPHEN 7.5; 325 MG/1; MG/1
1 TABLET ORAL EVERY 8 HOURS PRN
Qty: 90 TABLET | Refills: 0 | Status: SHIPPED | OUTPATIENT
Start: 2025-03-13 | End: 2025-06-11

## 2025-03-13 NOTE — TELEPHONE ENCOUNTER
Comments: Last [place] refill was 1-28-25    Last Office Visit (last PCP visit):   12/11/2024    Next Visit Date:  Future Appointments   Date Time Provider Department Center   3/18/2025  3:00 PM Marleni Mcgrath APRN - CNP [facility] DEP       **If hasn't been seen in over a year OR hasn't followed up according to last diabetes/ADHD visit, make appointment for patient before sending refill to provider.    Rx requested:  Requested Prescriptions     Pending Prescriptions Disp Refills    hydrOXYzine pamoate (VISTARIL) 25 MG capsule 90 capsule 0     Sig: Take 1 capsule by mouth 3 times daily as needed for Anxiety    HYDROcodone-acetaminophen (NORCO) 7.5-325 MG per tablet 90 tablet 0     Sig: Take 1 tablet by mouth every 8 hours as needed for Pain for up to 90 days. Intended supply: 30 days Max Daily Amount: 3 tablets

## 2025-04-29 ENCOUNTER — OFFICE VISIT (OUTPATIENT)
Dept: FAMILY MEDICINE CLINIC | Age: 65
End: 2025-04-29
Payer: MEDICARE

## 2025-04-29 VITALS
SYSTOLIC BLOOD PRESSURE: 150 MMHG | OXYGEN SATURATION: 98 % | DIASTOLIC BLOOD PRESSURE: 92 MMHG | HEIGHT: 64 IN | WEIGHT: 150.8 LBS | TEMPERATURE: 98.2 F | HEART RATE: 80 BPM | BODY MASS INDEX: 25.74 KG/M2

## 2025-04-29 DIAGNOSIS — I10 ESSENTIAL HYPERTENSION: Primary | ICD-10-CM

## 2025-04-29 DIAGNOSIS — G95.9 CERVICAL MYELOPATHY (HCC): ICD-10-CM

## 2025-04-29 DIAGNOSIS — F33.1 MAJOR DEPRESSIVE DISORDER, RECURRENT EPISODE, MODERATE (HCC): ICD-10-CM

## 2025-04-29 DIAGNOSIS — F41.1 GAD (GENERALIZED ANXIETY DISORDER): ICD-10-CM

## 2025-04-29 DIAGNOSIS — F11.29 OPIOID DEPENDENCE WITH OPIOID-INDUCED DISORDER (HCC): ICD-10-CM

## 2025-04-29 DIAGNOSIS — F41.9 ANXIETY: ICD-10-CM

## 2025-04-29 PROCEDURE — 99214 OFFICE O/P EST MOD 30 MIN: CPT | Performed by: NURSE PRACTITIONER

## 2025-04-29 PROCEDURE — 3077F SYST BP >= 140 MM HG: CPT | Performed by: NURSE PRACTITIONER

## 2025-04-29 PROCEDURE — 3080F DIAST BP >= 90 MM HG: CPT | Performed by: NURSE PRACTITIONER

## 2025-04-29 RX ORDER — AMITRIPTYLINE HYDROCHLORIDE 150 MG/1
150 TABLET ORAL NIGHTLY
Qty: 30 TABLET | Refills: 5 | Status: SHIPPED | OUTPATIENT
Start: 2025-04-29

## 2025-04-29 ASSESSMENT — ENCOUNTER SYMPTOMS
DIARRHEA: 0
CONSTIPATION: 0
COUGH: 0
SHORTNESS OF BREATH: 0

## 2025-04-29 NOTE — PROGRESS NOTES
Subjective  Chief Complaint   Patient presents with   • Anxiety     Is still easily agitated, short tempered,    • Medication Refill     Refill, Pended   • Discuss Medications     Would like to discuss getting off some of her medications   • Mass     States that she thinks she has a bump or mole on the LT side of the middle part of her back, x 2/3 months ago   • Depression     Is dealing with a lot of life stressors at this time, lost a long time friend       HPI    History of Present Illness  The patient is a 64-year-old female who presents for evaluation of anxiety, hypertension, hair loss, and a skin tag.    Significant stress is reported due to her role as a guardian for a  since 2006. The  recently suffered from double pneumonia and a traumatic brain injury, leading to ICU admission. Additionally, the sudden death of a close friend from a heart attack has added to her stress. Despite these stressors, she does not endorse feelings of depression or suicidal ideation but acknowledges increased agitation and irritability.    She expresses a desire to reduce her medication regimen, citing fatigue from the current regimen. Current medications include Viibryd, hydroxyzine, and clonidine. She is seeking advice on whether to continue these medications or consider alternatives. Attempts to contact her psychiatrist have been unsuccessful due to a disconnected phone line, and she does not believe psychiatric intervention is necessary at this time. Her medication schedule includes Viibryd with dinner, hydroxyzine in the morning, and clonidine at dinner. Amitriptyline is taken at night, and a refill is requested.    Uncontrolled blood pressure is noted, which she attributes to current stress levels. Blood pressure was well-controlled during her last visit. Consideration is being given to discontinuing Topamax and one of her antihypertensive medications while continuing the other.    Hair loss is observed,

## 2025-04-30 DIAGNOSIS — G95.9 CERVICAL MYELOPATHY (HCC): ICD-10-CM

## 2025-04-30 DIAGNOSIS — M54.12 BRACHIAL NEURITIS OR RADICULITIS: ICD-10-CM

## 2025-04-30 DIAGNOSIS — M54.17 LUMBOSACRAL RADICULOPATHY DUE TO TRAUMA: ICD-10-CM

## 2025-05-01 ENCOUNTER — TELEPHONE (OUTPATIENT)
Dept: FAMILY MEDICINE CLINIC | Age: 65
End: 2025-05-01

## 2025-05-01 RX ORDER — HYDROCODONE BITARTRATE AND ACETAMINOPHEN 7.5; 325 MG/1; MG/1
1 TABLET ORAL EVERY 8 HOURS PRN
Qty: 90 TABLET | Refills: 0 | Status: SHIPPED | OUTPATIENT
Start: 2025-05-01 | End: 2025-07-30

## 2025-05-01 NOTE — TELEPHONE ENCOUNTER
[facility] calling to get the Norco script clarified. It says 90 day supply with a 30 day refill.  Please call them back @ [phone]

## 2025-05-01 NOTE — TELEPHONE ENCOUNTER
Comments: Last [place] refill was 3-13-25    Last Office Visit (last PCP visit):   4/29/2025    Next Visit Date:  Future Appointments   Date Time Provider Department Center   5/29/2025  2:00 PM Marleni Mcgrath APRN - CNP [facility] DEP       **If hasn't been seen in over a year OR hasn't followed up according to last diabetes/ADHD visit, make appointment for patient before sending refill to provider.    Rx requested:  Requested Prescriptions     Pending Prescriptions Disp Refills    HYDROcodone-acetaminophen (NORCO) 7.5-325 MG per tablet 90 tablet 0     Sig: Take 1 tablet by mouth every 8 hours as needed for Pain for up to 90 days. Intended supply: 30 days Max Daily Amount: 3 tablets

## 2025-05-13 DIAGNOSIS — I10 ESSENTIAL HYPERTENSION: ICD-10-CM

## 2025-05-14 RX ORDER — LOSARTAN POTASSIUM 100 MG/1
100 TABLET ORAL DAILY
Qty: 30 TABLET | Refills: 5 | Status: SHIPPED | OUTPATIENT
Start: 2025-05-14

## 2025-05-14 NOTE — TELEPHONE ENCOUNTER
Comments:     Last Office Visit (last PCP visit):   4/29/2025    Next Visit Date:  Future Appointments   Date Time Provider Department Center   5/29/2025  2:00 PM Marleni Mcgrath APRN - CNP [facility] DEP       **If hasn't been seen in over a year OR hasn't followed up according to last diabetes/ADHD visit, make appointment for patient before sending refill to provider.    Rx requested:  Requested Prescriptions     Pending Prescriptions Disp Refills    losartan (COZAAR) 100 MG tablet 30 tablet 5     Sig: Take 1 tablet by mouth daily

## 2025-05-29 ENCOUNTER — OFFICE VISIT (OUTPATIENT)
Dept: FAMILY MEDICINE CLINIC | Age: 65
End: 2025-05-29
Payer: MEDICARE

## 2025-05-29 VITALS
DIASTOLIC BLOOD PRESSURE: 92 MMHG | SYSTOLIC BLOOD PRESSURE: 148 MMHG | BODY MASS INDEX: 25.85 KG/M2 | HEART RATE: 75 BPM | WEIGHT: 151.4 LBS | OXYGEN SATURATION: 98 % | TEMPERATURE: 98.7 F | HEIGHT: 64 IN

## 2025-05-29 DIAGNOSIS — I10 ESSENTIAL HYPERTENSION: ICD-10-CM

## 2025-05-29 DIAGNOSIS — L98.9 SKIN LESION: ICD-10-CM

## 2025-05-29 DIAGNOSIS — F41.9 ANXIETY: ICD-10-CM

## 2025-05-29 DIAGNOSIS — F33.1 MAJOR DEPRESSIVE DISORDER, RECURRENT EPISODE, MODERATE (HCC): Primary | ICD-10-CM

## 2025-05-29 PROCEDURE — 3080F DIAST BP >= 90 MM HG: CPT | Performed by: NURSE PRACTITIONER

## 2025-05-29 PROCEDURE — 3077F SYST BP >= 140 MM HG: CPT | Performed by: NURSE PRACTITIONER

## 2025-05-29 PROCEDURE — 99214 OFFICE O/P EST MOD 30 MIN: CPT | Performed by: NURSE PRACTITIONER

## 2025-05-29 RX ORDER — VILAZODONE HYDROCHLORIDE 20 MG/1
20 TABLET ORAL DAILY
Qty: 90 TABLET | Refills: 1 | Status: CANCELLED | OUTPATIENT
Start: 2025-05-29

## 2025-05-29 RX ORDER — VILAZODONE HYDROCHLORIDE 20 MG/1
20 TABLET ORAL DAILY
Qty: 30 TABLET | Refills: 5 | Status: SHIPPED | OUTPATIENT
Start: 2025-05-29

## 2025-05-29 RX ORDER — LOSARTAN POTASSIUM AND HYDROCHLOROTHIAZIDE 12.5; 1 MG/1; MG/1
1 TABLET ORAL DAILY
Qty: 30 TABLET | Refills: 5 | Status: SHIPPED | OUTPATIENT
Start: 2025-05-29

## 2025-05-29 ASSESSMENT — ENCOUNTER SYMPTOMS
COUGH: 0
SHORTNESS OF BREATH: 0
CONSTIPATION: 0
DIARRHEA: 0

## 2025-05-29 NOTE — PROGRESS NOTES
Subjective  Chief Complaint   Patient presents with    Discuss Medications     Would like to discuss if she should still continue taking the Vilazodone     Medication Refill     Refill, Pended, if approved    1 Month Follow-Up     Does have Concerns    Anxiety     Things are better when she takes the hydroxyzine, states that it is effective     Skin Problem     Has some bumps on her forehead and a bump on the LT eyelid that she would like to have looked at    Depression     States that things are going much better        HPI    History of Present Illness  The patient is a 64-year-old female who presents for evaluation of depression, anxiety, hypertension, and skin lesions.    She reports a positive response to her current medication regimen, which includes Viibryd 20 mg taken with dinner. She expresses a desire to discontinue Viibryd due to the overall goal of reducing her medication load. She has been gradually reducing her Topamax dosage over the past month, from two daily doses to one, and is considering further reduction to 25 mg. She continues to experience emotional distress, including crying episodes, but reports no feelings of anger or suicidal ideation. She acknowledges a history of trauma but does not currently feel its impact.    She is currently on clonidine twice daily and hydroxyzine as needed for anxiety. She reports a significant improvement in her mental health status, attributing this to her employment and the responsibilities it entails. She has experienced grief following the loss of a close friend and colleague but does not report any debilitating symptoms such as lack of motivation or suicidal thoughts. Her last major depressive episode occurred in 11/2024.    She reports that her blood pressure is not well-controlled despite adherence to her medication regimen. She is currently on losartan 100 mg once daily and another unspecified medication taken twice daily. She has been monitoring her blood

## 2025-06-08 SDOH — HEALTH STABILITY: PHYSICAL HEALTH: ON AVERAGE, HOW MANY DAYS PER WEEK DO YOU ENGAGE IN MODERATE TO STRENUOUS EXERCISE (LIKE A BRISK WALK)?: 0 DAYS

## 2025-06-09 ENCOUNTER — OFFICE VISIT (OUTPATIENT)
Dept: FAMILY MEDICINE CLINIC | Age: 65
End: 2025-06-09
Payer: MEDICARE

## 2025-06-09 VITALS — TEMPERATURE: 96.9 F | HEART RATE: 87 BPM | OXYGEN SATURATION: 97 %

## 2025-06-09 DIAGNOSIS — Z12.83 SKIN CANCER SCREENING: Primary | ICD-10-CM

## 2025-06-09 DIAGNOSIS — L91.8 ACROCHORDON: ICD-10-CM

## 2025-06-09 DIAGNOSIS — L73.8 SEBACEOUS HYPERPLASIA: ICD-10-CM

## 2025-06-09 PROCEDURE — 99213 OFFICE O/P EST LOW 20 MIN: CPT | Performed by: FAMILY MEDICINE

## 2025-06-09 NOTE — PROGRESS NOTES
tablet  Commonly known as: HYZAAR  Take 1 tablet by mouth daily     MULTI-VITAMIN DAILY PO     omeprazole 20 MG delayed release capsule  Commonly known as: PRILOSEC     topiramate 100 MG tablet  Commonly known as: Topamax  Take 1 tablet by mouth 2 times daily     vilazodone HCl 20 MG Tabs  Commonly known as: Viibryd  Take 1 tablet by mouth daily                Plan:   Return in about 1 year (around 6/9/2026) for 15 min skin check.    Patient Instructions   Patient is in benign nature of all her findings.    Going to check with insurance regarding her coverage for potential liquid nitrogen therapy of the sebaceous hyperplasia.    This note was partially created with the assistance of dictation.  This may lead to grammatical or spelling errors.      Scot Neely M.D.

## 2025-06-09 NOTE — PATIENT INSTRUCTIONS
Patient is in benign nature of all her findings.    Going to check with insurance regarding her coverage for potential liquid nitrogen therapy of the sebaceous hyperplasia.

## 2025-06-17 DIAGNOSIS — G95.9 CERVICAL MYELOPATHY (HCC): ICD-10-CM

## 2025-06-17 DIAGNOSIS — M54.17 LUMBOSACRAL RADICULOPATHY DUE TO TRAUMA: ICD-10-CM

## 2025-06-17 DIAGNOSIS — M54.12 BRACHIAL NEURITIS OR RADICULITIS: ICD-10-CM

## 2025-06-17 RX ORDER — HYDROCODONE BITARTRATE AND ACETAMINOPHEN 7.5; 325 MG/1; MG/1
1 TABLET ORAL EVERY 8 HOURS PRN
Qty: 90 TABLET | Refills: 0 | Status: SHIPPED | OUTPATIENT
Start: 2025-06-17 | End: 2025-09-15

## 2025-06-17 NOTE — TELEPHONE ENCOUNTER
Comments: Last [place] refill was 5-1-25    Last Office Visit (last PCP visit):   5/29/2025    Next Visit Date:  Future Appointments   Date Time Provider Department Center   7/1/2025  2:30 PM Marleni Mcgrath APRN - CNP [facility] DEP   6/10/2026  3:00 PM Scot Neely MD [facility] DEP       **If hasn't been seen in over a year OR hasn't followed up according to last diabetes/ADHD visit, make appointment for patient before sending refill to provider.    Rx requested:  Requested Prescriptions     Pending Prescriptions Disp Refills    HYDROcodone-acetaminophen (NORCO) 7.5-325 MG per tablet 90 tablet 0     Sig: Take 1 tablet by mouth every 8 hours as needed for Pain for up to 90 days. Intended supply: 30 days Max Daily Amount: 3 tablets

## 2025-07-10 ENCOUNTER — OFFICE VISIT (OUTPATIENT)
Dept: FAMILY MEDICINE CLINIC | Age: 65
End: 2025-07-10
Payer: MEDICARE

## 2025-07-10 VITALS
DIASTOLIC BLOOD PRESSURE: 82 MMHG | SYSTOLIC BLOOD PRESSURE: 138 MMHG | HEART RATE: 66 BPM | WEIGHT: 153.8 LBS | TEMPERATURE: 97.9 F | BODY MASS INDEX: 26.26 KG/M2 | HEIGHT: 64 IN | OXYGEN SATURATION: 98 %

## 2025-07-10 DIAGNOSIS — G95.9 CERVICAL MYELOPATHY (HCC): ICD-10-CM

## 2025-07-10 DIAGNOSIS — M54.17 LUMBOSACRAL RADICULOPATHY DUE TO TRAUMA: ICD-10-CM

## 2025-07-10 DIAGNOSIS — F41.9 ANXIETY: ICD-10-CM

## 2025-07-10 DIAGNOSIS — F33.1 MAJOR DEPRESSIVE DISORDER, RECURRENT EPISODE, MODERATE (HCC): ICD-10-CM

## 2025-07-10 DIAGNOSIS — R53.83 FATIGUE, UNSPECIFIED TYPE: ICD-10-CM

## 2025-07-10 DIAGNOSIS — E78.5 HYPERLIPIDEMIA, UNSPECIFIED HYPERLIPIDEMIA TYPE: Primary | ICD-10-CM

## 2025-07-10 DIAGNOSIS — I10 ESSENTIAL HYPERTENSION: ICD-10-CM

## 2025-07-10 PROCEDURE — 99214 OFFICE O/P EST MOD 30 MIN: CPT | Performed by: NURSE PRACTITIONER

## 2025-07-10 PROCEDURE — 3079F DIAST BP 80-89 MM HG: CPT | Performed by: NURSE PRACTITIONER

## 2025-07-10 PROCEDURE — 1124F ACP DISCUSS-NO DSCNMKR DOCD: CPT | Performed by: NURSE PRACTITIONER

## 2025-07-10 PROCEDURE — 3075F SYST BP GE 130 - 139MM HG: CPT | Performed by: NURSE PRACTITIONER

## 2025-07-10 RX ORDER — ATORVASTATIN CALCIUM 10 MG/1
10 TABLET, FILM COATED ORAL DAILY
Qty: 90 TABLET | Refills: 1 | Status: SHIPPED | OUTPATIENT
Start: 2025-07-10

## 2025-07-10 ASSESSMENT — ENCOUNTER SYMPTOMS
SHORTNESS OF BREATH: 0
COUGH: 0
ABDOMINAL PAIN: 1
CONSTIPATION: 1

## 2025-07-10 NOTE — PROGRESS NOTES
about 3 months (around 10/10/2025) for medicare wellness exam.    Assessment & Plan  1. Blood pressure management.  - Blood pressure is showing signs of improvement.  - Gradual reduction in clonidine dosage recommended, starting with a decrease to one tablet daily.  - Advised to monitor blood pressure closely during this period.  - If blood pressure increases, adjustments to other medications may be necessary.    2. Hot flashes.  - Hot flashes could be a side effect of Viibryd, an SNRI that can cause heat intolerance.  - CBC and thyroid function test will be ordered to rule out other potential causes.  - Symptoms started recently, possibly linked to medication change.  - Monitoring mood as Viibryd can also act as a stabilizer.    3. Constipation.  - Advised to increase MiraLAX intake to daily use.  - If constipation persists, can add senna to the regimen.  - Chronic Norco use may contribute to constipation, requiring proactive management.  - Patient reports infrequent bowel movements and occasional stomach cramps.    4. Health maintenance.  - Due for cholesterol check.  - Bone density scan to be scheduled to coincide with mammogram at the end of the year.  - Lab order provided for CBC, thyroid function, and cholesterol check to be completed while fasting.  - Follow-up visit scheduled in 3 months for Medicare wellness check.      Side effects, adverse effects of the medication prescribed today, as well as treatment plan/ rationale and result expectations have been discussed with the patient who expresses understanding and desires to proceed.    Close follow up to evaluate treatment results and for coordination of care.  I have reviewed the patient's medical history in detail and updated the computerized patient record.    As always, patient is advised that if symptoms worsen in any way they will proceed to the nearest emergency room.          RINA Vicente - CNP

## 2025-07-21 DIAGNOSIS — M54.12 BRACHIAL NEURITIS OR RADICULITIS: ICD-10-CM

## 2025-07-21 DIAGNOSIS — M54.17 LUMBOSACRAL RADICULOPATHY DUE TO TRAUMA: ICD-10-CM

## 2025-07-21 DIAGNOSIS — F41.9 ANXIETY: ICD-10-CM

## 2025-07-21 DIAGNOSIS — G95.9 CERVICAL MYELOPATHY (HCC): ICD-10-CM

## 2025-07-22 RX ORDER — HYDROCODONE BITARTRATE AND ACETAMINOPHEN 7.5; 325 MG/1; MG/1
1 TABLET ORAL EVERY 8 HOURS PRN
Qty: 90 TABLET | Refills: 0 | Status: SHIPPED | OUTPATIENT
Start: 2025-07-22 | End: 2025-10-20

## 2025-07-22 RX ORDER — HYDROXYZINE PAMOATE 25 MG/1
25 CAPSULE ORAL 3 TIMES DAILY PRN
Qty: 90 CAPSULE | Refills: 0 | Status: SHIPPED | OUTPATIENT
Start: 2025-07-22

## 2025-07-22 NOTE — TELEPHONE ENCOUNTER
Comments: Last [place] refill was 6-17-25    Last Office Visit (last PCP visit):   7/10/2025    Next Visit Date:  Future Appointments   Date Time Provider Department Center   10/9/2025  3:45 PM Marleni Mcgrath APRN - CNP [facility] DEP   6/10/2026  3:00 PM Scot Neely MD [facility] DEP       **If hasn't been seen in over a year OR hasn't followed up according to last diabetes/ADHD visit, make appointment for patient before sending refill to provider.    Rx requested:  Requested Prescriptions     Pending Prescriptions Disp Refills    hydrOXYzine pamoate (VISTARIL) 25 MG capsule 90 capsule 0     Sig: Take 1 capsule by mouth 3 times daily as needed for Anxiety    HYDROcodone-acetaminophen (NORCO) 7.5-325 MG per tablet 90 tablet 0     Sig: Take 1 tablet by mouth every 8 hours as needed for Pain for up to 90 days. Intended supply: 30 days Max Daily Amount: 3 tablets

## 2025-08-13 DIAGNOSIS — F41.1 GAD (GENERALIZED ANXIETY DISORDER): ICD-10-CM

## 2025-08-14 RX ORDER — CLONIDINE HYDROCHLORIDE 0.2 MG/1
0.2 TABLET ORAL 2 TIMES DAILY
Qty: 60 TABLET | Refills: 5 | Status: SHIPPED | OUTPATIENT
Start: 2025-08-14

## 2025-09-01 ENCOUNTER — PATIENT MESSAGE (OUTPATIENT)
Dept: FAMILY MEDICINE CLINIC | Age: 65
End: 2025-09-01

## 2025-09-01 DIAGNOSIS — G95.9 CERVICAL MYELOPATHY (HCC): ICD-10-CM

## 2025-09-01 DIAGNOSIS — M54.17 LUMBOSACRAL RADICULOPATHY DUE TO TRAUMA: ICD-10-CM

## 2025-09-01 DIAGNOSIS — M54.12 BRACHIAL NEURITIS OR RADICULITIS: ICD-10-CM

## 2025-09-02 RX ORDER — HYDROCODONE BITARTRATE AND ACETAMINOPHEN 7.5; 325 MG/1; MG/1
1 TABLET ORAL EVERY 8 HOURS PRN
Qty: 90 TABLET | Refills: 0 | Status: SHIPPED | OUTPATIENT
Start: 2025-09-02 | End: 2025-12-01